# Patient Record
Sex: MALE | Race: BLACK OR AFRICAN AMERICAN | NOT HISPANIC OR LATINO | URBAN - METROPOLITAN AREA
[De-identification: names, ages, dates, MRNs, and addresses within clinical notes are randomized per-mention and may not be internally consistent; named-entity substitution may affect disease eponyms.]

---

## 2018-04-05 ENCOUNTER — INPATIENT (INPATIENT)
Facility: HOSPITAL | Age: 53
LOS: 25 days | Discharge: NURSING, REHAB OR LONG-TERM CARE | DRG: 23 | End: 2018-05-01
Attending: PSYCHIATRY & NEUROLOGY | Admitting: PSYCHIATRY & NEUROLOGY
Payer: COMMERCIAL

## 2018-04-05 VITALS
OXYGEN SATURATION: 99 % | RESPIRATION RATE: 16 BRPM | SYSTOLIC BLOOD PRESSURE: 157 MMHG | DIASTOLIC BLOOD PRESSURE: 83 MMHG | HEART RATE: 60 BPM

## 2018-04-05 DIAGNOSIS — I63.512 CEREBRAL INFARCTION DUE TO UNSPECIFIED OCCLUSION OR STENOSIS OF LEFT MIDDLE CEREBRAL ARTERY: ICD-10-CM

## 2018-04-05 LAB
ANION GAP SERPL CALC-SCNC: 13 MMOL/L — SIGNIFICANT CHANGE UP (ref 5–17)
APTT BLD: 33.4 SEC — SIGNIFICANT CHANGE UP (ref 27.5–37.4)
BUN SERPL-MCNC: 18 MG/DL — SIGNIFICANT CHANGE UP (ref 7–23)
CALCIUM SERPL-MCNC: 9.3 MG/DL — SIGNIFICANT CHANGE UP (ref 8.4–10.5)
CHLORIDE SERPL-SCNC: 97 MMOL/L — SIGNIFICANT CHANGE UP (ref 96–108)
CHOLEST SERPL-MCNC: 210 MG/DL — HIGH (ref 10–199)
CO2 SERPL-SCNC: 24 MMOL/L — SIGNIFICANT CHANGE UP (ref 22–31)
CREAT SERPL-MCNC: 1.38 MG/DL — HIGH (ref 0.5–1.3)
CRP SERPL-MCNC: 0.12 MG/DL — SIGNIFICANT CHANGE UP (ref 0–0.4)
GLUCOSE SERPL-MCNC: 105 MG/DL — HIGH (ref 70–99)
HBA1C BLD-MCNC: 5.1 % — SIGNIFICANT CHANGE UP (ref 4–5.6)
HCT VFR BLD CALC: 42.1 % — SIGNIFICANT CHANGE UP (ref 39–50)
HDLC SERPL-MCNC: 107 MG/DL — SIGNIFICANT CHANGE UP (ref 40–125)
HGB BLD-MCNC: 13.9 G/DL — SIGNIFICANT CHANGE UP (ref 13–17)
INR BLD: 1.13 — SIGNIFICANT CHANGE UP (ref 0.88–1.16)
LIPID PNL WITH DIRECT LDL SERPL: 90 MG/DL — SIGNIFICANT CHANGE UP
MAGNESIUM SERPL-MCNC: 1.9 MG/DL — SIGNIFICANT CHANGE UP (ref 1.6–2.6)
MCHC RBC-ENTMCNC: 28.8 PG — SIGNIFICANT CHANGE UP (ref 27–34)
MCHC RBC-ENTMCNC: 33 G/DL — SIGNIFICANT CHANGE UP (ref 32–36)
MCV RBC AUTO: 87.2 FL — SIGNIFICANT CHANGE UP (ref 80–100)
PCP SPEC-MCNC: SIGNIFICANT CHANGE UP
PHOSPHATE SERPL-MCNC: 3.3 MG/DL — SIGNIFICANT CHANGE UP (ref 2.5–4.5)
PLATELET # BLD AUTO: 238 K/UL — SIGNIFICANT CHANGE UP (ref 150–400)
POTASSIUM SERPL-MCNC: 4.7 MMOL/L — SIGNIFICANT CHANGE UP (ref 3.5–5.3)
POTASSIUM SERPL-SCNC: 4.7 MMOL/L — SIGNIFICANT CHANGE UP (ref 3.5–5.3)
PROTHROM AB SERPL-ACNC: 12.6 SEC — SIGNIFICANT CHANGE UP (ref 9.8–12.7)
RBC # BLD: 4.83 M/UL — SIGNIFICANT CHANGE UP (ref 4.2–5.8)
RBC # FLD: 13.7 % — SIGNIFICANT CHANGE UP (ref 10.3–16.9)
SODIUM SERPL-SCNC: 134 MMOL/L — LOW (ref 135–145)
TOTAL CHOLESTEROL/HDL RATIO MEASUREMENT: 2 RATIO — LOW (ref 3.4–9.6)
TRIGL SERPL-MCNC: 65 MG/DL — SIGNIFICANT CHANGE UP (ref 10–149)
TSH SERPL-MCNC: 0.49 UIU/ML — SIGNIFICANT CHANGE UP (ref 0.35–4.94)
WBC # BLD: 8.2 K/UL — SIGNIFICANT CHANGE UP (ref 3.8–10.5)
WBC # FLD AUTO: 8.2 K/UL — SIGNIFICANT CHANGE UP (ref 3.8–10.5)

## 2018-04-05 PROCEDURE — 99291 CRITICAL CARE FIRST HOUR: CPT

## 2018-04-05 PROCEDURE — 99222 1ST HOSP IP/OBS MODERATE 55: CPT

## 2018-04-05 PROCEDURE — 93306 TTE W/DOPPLER COMPLETE: CPT | Mod: 26

## 2018-04-05 PROCEDURE — 93970 EXTREMITY STUDY: CPT | Mod: 26

## 2018-04-05 PROCEDURE — 61645 PERQ ART M-THROMBECT &/NFS: CPT | Mod: LT

## 2018-04-05 PROCEDURE — 70498 CT ANGIOGRAPHY NECK: CPT | Mod: 26

## 2018-04-05 PROCEDURE — 99291 CRITICAL CARE FIRST HOUR: CPT | Mod: 24

## 2018-04-05 PROCEDURE — 70496 CT ANGIOGRAPHY HEAD: CPT | Mod: 26

## 2018-04-05 PROCEDURE — 76377 3D RENDER W/INTRP POSTPROCES: CPT | Mod: 26

## 2018-04-05 RX ORDER — INSULIN LISPRO 100/ML
VIAL (ML) SUBCUTANEOUS EVERY 6 HOURS
Qty: 0 | Refills: 0 | Status: DISCONTINUED | OUTPATIENT
Start: 2018-04-05 | End: 2018-04-07

## 2018-04-05 RX ORDER — DEXTROSE 50 % IN WATER 50 %
12.5 SYRINGE (ML) INTRAVENOUS ONCE
Qty: 0 | Refills: 0 | Status: DISCONTINUED | OUTPATIENT
Start: 2018-04-05 | End: 2018-04-09

## 2018-04-05 RX ORDER — DEXTROSE 50 % IN WATER 50 %
1 SYRINGE (ML) INTRAVENOUS ONCE
Qty: 0 | Refills: 0 | Status: DISCONTINUED | OUTPATIENT
Start: 2018-04-05 | End: 2018-04-09

## 2018-04-05 RX ORDER — GLUCAGON INJECTION, SOLUTION 0.5 MG/.1ML
1 INJECTION, SOLUTION SUBCUTANEOUS ONCE
Qty: 0 | Refills: 0 | Status: DISCONTINUED | OUTPATIENT
Start: 2018-04-05 | End: 2018-04-09

## 2018-04-05 RX ORDER — SODIUM CHLORIDE 9 MG/ML
1000 INJECTION INTRAMUSCULAR; INTRAVENOUS; SUBCUTANEOUS
Qty: 0 | Refills: 0 | Status: DISCONTINUED | OUTPATIENT
Start: 2018-04-05 | End: 2018-04-06

## 2018-04-05 RX ORDER — PANTOPRAZOLE SODIUM 20 MG/1
40 TABLET, DELAYED RELEASE ORAL EVERY 24 HOURS
Qty: 0 | Refills: 0 | Status: DISCONTINUED | OUTPATIENT
Start: 2018-04-05 | End: 2018-04-06

## 2018-04-05 RX ORDER — ATORVASTATIN CALCIUM 80 MG/1
80 TABLET, FILM COATED ORAL AT BEDTIME
Qty: 0 | Refills: 0 | Status: DISCONTINUED | OUTPATIENT
Start: 2018-04-05 | End: 2018-05-01

## 2018-04-05 RX ORDER — PANTOPRAZOLE SODIUM 20 MG/1
40 TABLET, DELAYED RELEASE ORAL
Qty: 0 | Refills: 0 | Status: DISCONTINUED | OUTPATIENT
Start: 2018-04-05 | End: 2018-04-05

## 2018-04-05 RX ORDER — ACETAMINOPHEN 500 MG
650 TABLET ORAL EVERY 6 HOURS
Qty: 0 | Refills: 0 | Status: DISCONTINUED | OUTPATIENT
Start: 2018-04-05 | End: 2018-05-01

## 2018-04-05 RX ORDER — SODIUM CHLORIDE 9 MG/ML
1000 INJECTION, SOLUTION INTRAVENOUS
Qty: 0 | Refills: 0 | Status: DISCONTINUED | OUTPATIENT
Start: 2018-04-05 | End: 2018-04-09

## 2018-04-05 RX ORDER — DEXTROSE 50 % IN WATER 50 %
12.5 SYRINGE (ML) INTRAVENOUS ONCE
Qty: 0 | Refills: 0 | Status: COMPLETED | OUTPATIENT
Start: 2018-04-05 | End: 2018-04-05

## 2018-04-05 RX ORDER — SODIUM CHLORIDE 9 MG/ML
1000 INJECTION INTRAMUSCULAR; INTRAVENOUS; SUBCUTANEOUS
Qty: 0 | Refills: 0 | Status: DISCONTINUED | OUTPATIENT
Start: 2018-04-05 | End: 2018-04-05

## 2018-04-05 RX ADMIN — SODIUM CHLORIDE 75 MILLILITER(S): 9 INJECTION INTRAMUSCULAR; INTRAVENOUS; SUBCUTANEOUS at 11:34

## 2018-04-05 RX ADMIN — PANTOPRAZOLE SODIUM 40 MILLIGRAM(S): 20 TABLET, DELAYED RELEASE ORAL at 17:46

## 2018-04-05 RX ADMIN — Medication 12.5 GRAM(S): at 23:20

## 2018-04-05 RX ADMIN — SODIUM CHLORIDE 100 MILLILITER(S): 9 INJECTION INTRAMUSCULAR; INTRAVENOUS; SUBCUTANEOUS at 22:00

## 2018-04-05 NOTE — H&P ADULT - PMH
Cerebrovascular accident (CVA) due to occlusion of left middle cerebral artery  Left M2 occlusion  Irregular heart beat

## 2018-04-05 NOTE — PROVIDER CONTACT NOTE (OTHER) - BACKGROUND
As per report from patient presents with right sided weakness and difficulty speaking and following command.

## 2018-04-05 NOTE — H&P ADULT - PROBLEM SELECTOR PLAN 1
S/P IV TPA and cerebral angiogram with successful mechanical thrombectomy.  -150  Neuro checks along with vitals, puncture site and pulses checks as per orders.  NPO x 24 hrs  No anticoagulants or anti thrombotics x 24 hrs  Repeat head CT 24 hrs post IV TPA or if increased swelling right periorbital region or change in MS.  LE Dopplers urgent r/o DVT  Hold VTE S/P IV TPA and cerebral angiogram with successful mechanical thrombectomy.  -150  Neuro checks along with vitals, puncture site and pulses checks as per orders.  NPO for now  No anticoagulants or anti thrombotics x 24 hrs  Repeat head CT 24 hrs post IV TPA or if increased swelling right periorbital region or change in MS.  LE Dopplers urgent r/o DVT

## 2018-04-05 NOTE — H&P ADULT - HISTORY OF PRESENT ILLNESS
52 year old ambidextrous (right  hand preference) who has been under the care of cardiologist in [facility] (Dr. Ding) for "heart palpitations, not on any medications.  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 for further care.  Upon arrival patient noted to be aphasic, right facial droop, right hemiplegia, and right side neglect.  A small hematoma note above his right eye.  CTA left M2 occlusion taken to angio suite for mechanical thrombectomy. 52 year old ambidextrous (right  hand preference) who has been under the care of cardiologist in [facility] (Dr. Ding) for "heart palpitations, not on any medications.  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  Upon arrival patient noted to be aphasic, right facial droop, right hemiplegia, and left gaze preference.  A small hematoma note above his right eye.  CTA left M2 occlusion taken to angio suite for mechanical thrombectomy.

## 2018-04-05 NOTE — PROVIDER CONTACT NOTE (OTHER) - SITUATION
Patient received from [facility] via ambulance accompanied by EMT for stroke. Patient transferred from stretcher to bed. All safety precautions maintained. Stroke code called.

## 2018-04-05 NOTE — PROVIDER CONTACT NOTE (OTHER) - ASSESSMENT
Urine out 20ml x1 hour. Urine output decreasing throughout day. -115. No changes in neuro status noted.

## 2018-04-05 NOTE — H&P ADULT - NSHPPHYSICALEXAM_GEN_ALL_CORE
General: WD,WN male in NAD  Neuro: Not FC, Aphasic, right hemiplegia, right facial droop, and left gaze preference.             Left side 5/5  Heart: S1-S2  Lungs: CTA B/L  Abd: soft, NT, ND  Ext: No C/C/E  Skin: No obvious rashes, no achymosis. General: WD,WN male in NAD  Neuro: Not FC, Aphasic, right hemiplegia, right facial droop, and left gaze preference.             Left side 5/5  Heart: S1-S2  Lungs: CTA B/L  Abd: soft, NT, ND  Ext: No C/C/E  Skin: No obvious rashes, no ecchymosis.

## 2018-04-05 NOTE — H&P ADULT - NSHPSOCIALHISTORY_GEN_ALL_CORE
, 2 daughters, employed as a airplane maintainace person, no ilicit drug use, no smoking, social (weekend) drinker , 2 daughters, employed as a airplane , no illicit drug use, no smoking, social (weekend) drinker

## 2018-04-05 NOTE — CONSULT NOTE ADULT - SUBJECTIVE AND OBJECTIVE BOX
Patient is a 51 yo male with a pmhx of racing heart as per wife, otherwise no other PMHx and not on medications who is a transfer from [facility] for possible mechanical thrombectomy. 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 Patient unable to give a hx at this current time. Given TPA bolus 5:27 am for global aphasia, right sided arm and leg weakness.      REVIEW OF SYSTEMS:  Unable to obtain   VITAL SIGNS:  Vital Signs Last 24 Hrs  T(C): --  T(F): --  HR: --  BP: --  BP(mean): --  RR: --  SpO2: --    PHYSICAL EXAMINATION:  General: Well-developed, foaming at the mouth   Eyes: Conjunctiva and sclera clear.  Cardiovascular: Regular rate and rhythm; S1 and S2 Courtney  Neurologic:  - Mental Status:  Patient aphasic- intermittent following commands   - Cranial Nerves II-XII:    II:  Visual acuity is 20/20 bilaterally; Visual fields are full to confronation; Pupils are equal, round, and reactive to light.  III, IV, VI:  Extraocular movements are intact without nystagmus.  V:  Facial sensation is intact in the V1-V3 distribution bilaterally.  VII: right sided facial droop noted and patient unable to raise eyebrows   VIII:  Hearing is intact to finger rub.  IX, X:  Uvula is midline and soft palate rises symmetrically  XI:  Head turning and shoulder shrug are intact.  XII:  Tongue protudes in the midline.  - Motor:  Strength is 5/5 throughout.  There is no prontator drift.  Normal muscle bulk and tone throughout.  - Reflexes:  2+ and symmetric at the biceps, triceps, brachioradialis, knees, and ankles.  Plantar responses flexor.  - Sensory:  Intact to light touch, pin prick, vibration, and joint-position sense throughout.  - Coordination:  Finger-nose-finger and heel-knee-shin intact without dysmetria.  Rapid alternating hand movements intact.  - Gait:   Normal steps, base, arm swing, and turning.  Heel and toe walking are normal.  Tandem gait is normal.  Romberg testing is negative.    LABS:           RADIOLOGY & ADDITIONAL STUDIES:      IMPRESSION & PLAN:      Patient is a 51 yo male with a pmhx of racing heart as per wife, otherwise no other PMHx and not on medications who is a transfer from [facility] for possible mechanical thrombectomy. 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   Discussed with attending and transported to 11th floor for procedure. Patient is a 51 yo male with a pmhx of racing heart as per wife, otherwise no other PMHx and not on medications who is a transfer from [facility] for possible mechanical thrombectomy. 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 Patient unable to give a hx at this current time. Given TPA bolus 5:27 am for global aphasia, right sided arm and leg weakness.      REVIEW OF SYSTEMS:  Unable to obtain   VITAL SIGNS:  Vital Signs Last 24 Hrs  T(C): --  T(F): --  HR: --  BP: --  BP(mean): --  RR: --  SpO2: --    PHYSICAL EXAMINATION:  General: Well-developed, foaming at the mouth   Eyes: Conjunctiva and sclera clear.  Cardiovascular: Regular rate and rhythm; S1 and S2 Normal  Neurologic:  - Mental Status:  Patient aphasic- intermittent following commands   - Cranial Nerves II-XII:    II: Right visual field not responsive to confrontation;   III, IV, VI:  left gaze preference  VII: right sided facial droop noted and patient unable to raise eyebrows   XI:  unable to test   XII:  not following commands   - Motor:  no strength to gravity appreciated   There is no prontator drift.  Normal muscle bulk and tone throughout.  - Sensory: diminished sensation and proprioception appreciated in the right arm   - Coordination: not following commands   - Gait:  Deferred    LABS:     Pending       RADIOLOGY & ADDITIONAL STUDIES:  CT     IMPRESSION & PLAN:      Patient is a 51 yo male with a pmhx of "racing heart" as per wife, otherwise no other PMHx and not on medications who is a transfer from [facility] for possible mechanical thrombectomy for left M2 clot. 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   -- MRI needs to be ordered  -- high intensity statin, baby ASA,   -- permissive HTN up till 185 systolic in setting of TPA  -- follow TPA protocol;  Discussed with Neuro attending and transported to 11th floor for procedure. Patient is a 51 yo male with a pmhx of racing heart as per wife, otherwise no other PMHx and not on medications who is a transfer from [facility] for possible mechanical thrombectomy. As per EMS documentation, patient was found fallen working [place]. Patient unable to give a hx at this current time. Given TPA bolus 5:27 am for global aphasia, right sided arm and leg weakness.      REVIEW OF SYSTEMS:  Unable to obtain   VITAL SIGNS:  Vital Signs Last 24 Hrs  T(C): --  T(F): --  HR: --  BP: --  BP(mean): --  RR: --  SpO2: --    PHYSICAL EXAMINATION:  General: Well-developed, foaming at the mouth   Eyes: Conjunctiva and sclera clear.  Cardiovascular: Regular rate and rhythm; S1 and S2 Normal  Neurologic:  - Mental Status:  Patient aphasic- intermittent following commands   - Cranial Nerves II-XII:    II: Right visual field not responsive to confrontation;   III, IV, VI:  left gaze preference  VII: right sided facial droop noted and patient unable to raise eyebrows   XI:  unable to test   XII:  not following commands   - Motor:  no strength to gravity appreciated   There is no prontator drift.  Normal muscle bulk and tone throughout.  - Sensory: diminished sensation and proprioception appreciated in the right arm   - Coordination: not following commands   - Gait:  Deferred    LABS:     Pending       RADIOLOGY & ADDITIONAL STUDIES:  CT     IMPRESSION & PLAN:      Patient is a 51 yo male with a pmhx of "racing heart" as per wife, otherwise no other PMHx and not on medications who is a transfer from [facility] for possible mechanical thrombectomy for left M2 occlusion. 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   -- MRI needs to be ordered  -- high intensity statin, baby ASA,   -- permissive HTN up till 185 systolic in setting of TPA  -- follow TPA protocol;  Discussed with Neuro attending and transported to 11th floor for procedure. Patient is a 53 yo male with a pmhx of racing heart as per wife, otherwise no other PMHx and not on medications who is a transfer from [facility] for possible mechanical thrombectomy. 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 Last known normal was 3am. Patient unable to give a hx at this current time. Given TPA bolus 5:27 am for global aphasia, right sided arm and leg weakness.      REVIEW OF SYSTEMS:  Unable to obtain   VITAL SIGNS:  Vital Signs Last 24 Hrs  T(C): --  T(F): --  HR: --  BP: --  BP(mean): --  RR: --  SpO2: --    PHYSICAL EXAMINATION:  General: Well-developed, foaming at the mouth   Eyes: Conjunctiva and sclera clear.  Cardiovascular: Regular rate and rhythm; S1 and S2 Normal  Neurologic:  - Mental Status:  Patient aphasic- intermittent following commands   - Cranial Nerves II-XII:    II: Right visual field not responsive to confrontation;   III, IV, VI:  left gaze preference  VII: right sided facial droop noted and patient unable to raise eyebrows   XI:  unable to test   XII:  not following commands   - Motor:  no strength to gravity appreciated   There is no prontator drift.  Normal muscle bulk and tone throughout.  - Sensory: diminished sensation and proprioception appreciated in the right arm   - Coordination: not following commands   - Gait:  Deferred    LABS:     Pending       RADIOLOGY & ADDITIONAL STUDIES:  CT     IMPRESSION & PLAN:      Patient is a 53 yo male with a pmhx of "racing heart" as per wife, otherwise no other PMHx and not on medications who is a transfer from [facility] for possible mechanical thrombectomy for left M2 occlusion. 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   -- MRI needs to be ordered  -- high intensity statin, baby ASA,   -- permissive HTN up till 185 systolic in setting of TPA  -- follow TPA protocol;  Discussed with Neuro attending and transported to 11th floor for procedure.

## 2018-04-05 NOTE — BRIEF OPERATIVE NOTE - POST-OP DX
Cerebrovascular accident (CVA) due to occlusion of left middle cerebral artery  04/05/2018  Succesful recanalization TICI3  Active  Ronni Moralez

## 2018-04-05 NOTE — H&P ADULT - ASSESSMENT
NIHSS of 20 admitted for emergent angio and thrombectomy  q1h neuro checks  post intervention orders per protocol  formal s/s eval  resume home meds  rpt CTH 24 hrs from tpa, tomorrow am  NV checks   dc groin dsg in am  d/w Dr. Mota and ICU house staff

## 2018-04-05 NOTE — PROVIDER CONTACT NOTE (OTHER) - ACTION/TREATMENT ORDERED:
Finish dialysis and remove fluid. Give amiodarone bolus and then start amiodarone drip for heart rate control. Give 1 full amp of D50 and then recheck blood sugar in 15 minutes.

## 2018-04-05 NOTE — CONSULT NOTE ADULT - SUBJECTIVE AND OBJECTIVE BOX
HEALTH ISSUES - PROBLEM Dx:  Cerebrovascular accident (CVA) due to occlusion of left middle cerebral artery: Cerebrovascular accident (CVA) due to occlusion of left middle cerebral artery          CHEMOTHERAPY REGIMEN:        Day:                          Diet:  Protocol:                                    IVF:      MEDICATIONS  (STANDING):  atorvastatin 80 milliGRAM(s) Oral at bedtime  dextrose 5%. 1000 milliLiter(s) (50 mL/Hr) IV Continuous <Continuous>  dextrose 50% Injectable 12.5 Gram(s) IV Push once  insulin lispro (HumaLOG) corrective regimen sliding scale   SubCutaneous every 6 hours  pantoprazole  Injectable 40 milliGRAM(s) IV Push every 24 hours  sodium chloride 0.9%. 1000 milliLiter(s) (100 mL/Hr) IV Continuous <Continuous>    MEDICATIONS  (PRN):  acetaminophen   Tablet. 650 milliGRAM(s) Oral every 6 hours PRN Mild Pain (1 - 3)  dextrose Gel 1 Dose(s) Oral once PRN Blood Glucose LESS THAN 70 milliGRAM(s)/deciliter  glucagon  Injectable 1 milliGRAM(s) IntraMuscular once PRN Glucose LESS THAN 70 milligrams/deciliter      Allergies    Allergy Status Unknown    Intolerances        DVT Prophylaxis: [ ] YES [ ] NO      Antibiotics: [ ] YES [ ] NO    Pain Scale (1-10):       Location:    Vital Signs Last 24 Hrs  T(C): 36.8 (05 Apr 2018 21:39), Max: 36.8 (05 Apr 2018 14:26)  T(F): 98.3 (05 Apr 2018 21:39), Max: 98.3 (05 Apr 2018 21:39)  HR: 66 (05 Apr 2018 20:00) (58 - 84)  BP: 104/57 (05 Apr 2018 11:30) (104/57 - 157/83)  BP(mean): 76 (05 Apr 2018 11:30) (74 - 103)  RR: 13 (05 Apr 2018 20:00) (10 - 18)  SpO2: 99% (05 Apr 2018 20:00) (97% - 100%)    Drug Dosing Weight  Height (cm): 180.34 (05 Apr 2018 09:00)  Weight (kg): 92.3 (05 Apr 2018 09:00)  BMI (kg/m2): 28.4 (05 Apr 2018 09:00)  BSA (m2): 2.12 (05 Apr 2018 09:00)     Physical Exam:  · Constitutional	detailed exam  · Constitutional Details	well-developed; well-groomed  · Eyes	EOMI; PERRL; no drainage or redness  · ENMT Comments	dry mucous membranes  · Respiratory	detailed exam  · Respiratory Comments	normal breath sounds at the lung bases bilaterally  · Cardiovascular	Regular rate & rhythm, normal S1, S2; no murmurs, gallops or rubs; no S3, S4  · Abd-Soft non tender  ·Ext-no edema, clubbing or cyanosis    URINARY CATHETER: [ ] YES [ ] NO     LABS:  CBC Full  -  ( 05 Apr 2018 12:59 )  WBC Count : 8.2 K/uL  Hemoglobin : 13.9 g/dL  Hematocrit : 42.1 %  Platelet Count - Automated : 238 K/uL  Mean Cell Volume : 87.2 fL  Mean Cell Hemoglobin : 28.8 pg  Mean Cell Hemoglobin Concentration : 33.0 g/dL  Auto Neutrophil # : x  Auto Lymphocyte # : x  Auto Monocyte # : x  Auto Eosinophil # : x  Auto Basophil # : x  Auto Neutrophil % : x  Auto Lymphocyte % : x  Auto Monocyte % : x  Auto Eosinophil % : x  Auto Basophil % : x    04-05    134<L>  |  97  |  18  ----------------------------<  105<H>  4.7   |  24  |  1.38<H>    Ca    9.3      05 Apr 2018 12:54  Phos  3.3     04-05  Mg     1.9     04-05      PT/INR - ( 05 Apr 2018 12:54 )   PT: 12.6 sec;   INR: 1.13          PTT - ( 05 Apr 2018 12:54 )  PTT:33.4 sec      CULTURES:    RADIOLOGY & ADDITIONAL STUDIES:

## 2018-04-05 NOTE — PROGRESS NOTE ADULT - ASSESSMENT
52M with   1.  acute cerebrovascular accident, L M2 occlusion, s/p thrombectomy (04/05/2018, Dr. Mota) s/p TPA  2.  hyponatremia  3.  arrhythmia    HPI: onset / last normal; NIHSS; tPA inclusion/exclusion  DIAGNOSTICS: check HbA1C, check lipid panel (within 48h or last 30 days); Echo with bubble contrast; CT CTA MRI  MEDS: ASA within 24h, lipitor 80mg HS, SCDs, DVT Px by day 2, antithrombotic by day 2  CONSULTS: - REHAB:  dysphagia screen; speech and swallow evaluation; OT/PT evaluation; stroke consult; stroke education  MISCELLANEOUS: smoking cessation    PLAN:   NEURO: neurochecks q1h, PRN pain meds with Tylenol  stroke:  complete stroke core measures  REHAB:  physical therapy evaluation and management    EARLY MOB:      PULM:  Room air, incentive spirometry  CARDIO:  SBP goal 100-150mm Hg  ENDO:  Blood sugar goals 140-180 mg/dL, continue insulin sliding scale  GI:  PPI for GI prophylaxis  DIET:  RENAL:    HEM/ONC:  VTE Prophylaxis:   ID: afebrile, no leukocytosis  Social: will update family 52M with   1.  acute cerebrovascular accident, L M2 occlusion, s/p thrombectomy (04/05/2018, Dr. Mota) s/p TPA  2.  hyponatremia  3.  arrhythmia    PLAN:   NEURO: neurochecks q1h, PRN pain meds with Tylenol  stroke:  complete stroke core measures; MRI; post-tPA protocol, repeat CT in 24hours post-tPA; continue high dose statins; will start ASA tomorrow if repeat CT no hemorrhagic conversion.  REHAB:  physical therapy evaluation and management    EARLY MOB:      PULM:  Room air, incentive spirometry  CARDIO:  SBP goal 100-150mm Hg; echo with BS, needs BRANDON  ENDO:  Blood sugar goals 140-180 mg/dL, continue insulin sliding scale; f/u A12C lipid panel, TSH  GI:  PPI for GI prophylaxis  DIET: NPO for now; dysphagia screen tomorrow  RENAL:  IVF while NPO  HEM/ONC: s/p tPA  VTE Prophylaxis: SCDs only, no DVT chemoprophylaxis for now as patient is high risk for bleed (post-tPA)  ID: afebrile, no leukocytosis  Social: will update family    Patient at high risk for neurological deterioration or death due to:  hemorrhagic conversion, ICU delirium, DVT / PE, aspiration PNA.  Critical care time, excluding procedures: 60 minutes. 52M with   1.  acute cerebrovascular accident, L M2 occlusion, s/p thrombectomy (04/05/2018, Dr. Mota) s/p TPA  2.  hyponatremia  3.  arrhythmia    PLAN:   NEURO: neurochecks q1h, PRN pain meds with Tylenol  stroke:  complete stroke core measures; MRI; post-tPA protocol, repeat CT in 24hours post-tPA; continue high dose statins; will start ASA tomorrow if repeat CT no hemorrhagic conversion; hypercoagulable work-up  REHAB:  physical therapy evaluation and management    EARLY MOB:      PULM:  Room air, incentive spirometry  CARDIO:  SBP goal 120-150mm Hg; echo with BS, needs BRANDON  ENDO:  Blood sugar goals 140-180 mg/dL, continue insulin sliding scale; f/u A12C lipid panel, TSH  GI:  PPI for GI prophylaxis  DIET: NPO for now; dysphagia screen tomorrow  RENAL:  IVF while NPO  HEM/ONC: s/p tPA  VTE Prophylaxis: SCDs only, no DVT chemoprophylaxis for now as patient is high risk for bleed (post-tPA)  ID: afebrile, no leukocytosis  Social: will update family    Patient at high risk for neurological deterioration or death due to:  hemorrhagic conversion, ICU delirium, DVT / PE, aspiration PNA.  Critical care time, excluding procedures: 60 minutes.

## 2018-04-05 NOTE — BRIEF OPERATIVE NOTE - PROCEDURE
<<-----Click on this checkbox to enter Procedure Cerebral angiography with thrombolysis or thrombectomy if indicated  04/05/2018  Left M2 thrombectomy using Penumbra aspiration and stent retriever (Solitaire x1 pass)  Active  GRESTREP

## 2018-04-05 NOTE — H&P ADULT - FAMILY HISTORY
Father  Still living? No  Family history of cerebrovascular accident (CVA) in father, Age at diagnosis: Age Unknown

## 2018-04-05 NOTE — PROGRESS NOTE ADULT - SUBJECTIVE AND OBJECTIVE BOX
Post op Note    Dx: left M2 stroke    52y    Male   s/p cath angio and thrombectomy with successful recannilization of M2 segment of left MCA. Brought to MICU post op, extubated. Denies any pain. Heme/onc consulted for hypercoag w/u, echo performed.     T(C): 36.4 (04-05-18 @ 09:40), Max: 36.4 (04-05-18 @ 09:40)  HR: 58 (04-05-18 @ 11:00) (58 - 66)  BP: 119/64 (04-05-18 @ 11:00) (112/58 - 157/83)  RR: 14 (04-05-18 @ 11:00) (10 - 17)  SpO2: 100% (04-05-18 @ 11:00) (99% - 100%)  Wt(kg): --      Physical exam  awake, alert, severely aphasic, follows some simple commands  PERRL, EOMI  groin site c/d/i  Lt arm 5/5, LLE 5/5, neg drift, rt side hemiplegic, trace RLE movement to noxious stimulus  abd soft, NTND  respirations non labored  shepherd, a line, PIVs

## 2018-04-05 NOTE — PROVIDER CONTACT NOTE (OTHER) - ASSESSMENT
Receiving dialysis and more restless. -140 and afib. No increase in levophed at this. 1.7L out of 3L from dialysis removed. UF on hold at this time by dialysis RN. FS 71. No changes in neuro status noted/

## 2018-04-05 NOTE — PROGRESS NOTE ADULT - SUBJECTIVE AND OBJECTIVE BOX
=================================  NEUROCRITICAL CARE ATTENDING NOTE  =================================    FABRICIO ZEPEDA   MRN-[id number]  Summary:  52M with h/o palpitations, last normal at 5:03 a.m., found down by co-worker, taken to [facility].  CT showed L M2 occlusion, given tPA and transferred to [facility] for further care.  CTA showed M2 occlusion, brought to angio suite for mechanical thrombectomy.  Overnight Events: Admitted to NSICU     PAST MEDICAL & SURGICAL HISTORY: Cerebrovascular accident (CVA) due to occlusion of left middle cerebral artery: Left M2 occlusion Irregular heart beat  Allergies: ?  Home meds:  MVI    PHYSICAL EXAMINATION  T(C): 36.8 ( @ 14:26), Max: 36.8 ( @ 14:26) HR: 74 ( @ 14:00) (58 - 84) BP: 104/57 ( @ 11:30) (104/57 - 157/83) RR: 11 ( @ 14:00) (10 - 17) SpO2: 100% ( @ 14:00) (99% - 100%)   NEUROLOGIC EXAMINATION:  Patient is awake, alert, pupils 3mm equal and reactive to light, no fixed gaze, aphasic, follows commands intermittently, L UE/LE 5/5, R UE 0/5, R LE 1/5 to noxious  GENERAL:  not intubated, not in cardiorespiratory distress  EENT: anicteric  CARDIOVASC:  (+) S1 S2, normal rate and regular rhythm  PULMONARY:  clear to auscultation bilaterally  ABDOMEN:  soft, nontender, with normoactive bowel sounds  EXTREMITIES:  no edema  SKIN:  no rash    LABS:  CAPILLARY BLOOD GLUCOSE 81 89               13.9   8.2   )-----------( 238      ( 2018 12:59 )             42.1     134<L>  |  97  |  18  ----------------------------<  105<H>  4.7   |  24  |  1.38<H>    Ca    9.3      2018 12:54  Phos  3.3     -05  Mg     1.9      @ 07:01  -   @ 07:00  IN: 20 mL / OUT: 0 mL / NET: 20 mL    Bacteriology:  CSF studies:  EEG:  Neuroimagin/05 CTA:  abrupt cut off L M2 segment superior division c/w occlusion, R P1 segment hpoplastic   CT head:  L caudate head lacunar infarct indeterminate, L parietal old infarct, post L frontal remote infarct, chronic lacunar infarct L caudate head, no acute abnormality  Other imagin/05 LE Doppler: R CFV not examined, no DVT    MEDICATIONS: atorvastatin 80mg HS mod ISS pantoprazole 40 IV q24h     IV FLUIDS: NS@75cc/hr  DRIPS:  DIET: NPO  Lines:  Drains:      CODE STATUS:  full code                       GOALS OF CARE:  aggressive                      DISPOSITION:  ICU =================================  NEUROCRITICAL CARE ATTENDING NOTE  =================================    FABRICIO ZEPEDA   [id number]  Summary:  52M with h/o palpitations, last normal at 5:03 a.m., found down by co-worker, taken to [facility].  CT showed L M2 occlusion, given tPA and transferred to [facility] for further care.  CTA showed M2 occlusion, brought to angio suite for mechanical thrombectomy.  Overnight Events: Admitted to NSICU     PAST MEDICAL & SURGICAL HISTORY: Cerebrovascular accident (CVA) due to occlusion of left middle cerebral artery: Left M2 occlusion Irregular heart beat  Allergies: ?  Home meds:  MVI    PHYSICAL EXAMINATION  T(C): 36.8 ( @ 14:26), Max: 36.8 ( @ 14:26) HR: 74 ( @ 14:00) (58 - 84) BP: 104/57 ( @ 11:30) (104/57 - 157/83) RR: 11 ( @ 14:00) (10 - 17) SpO2: 100% ( @ 14:00) (99% - 100%)   NEUROLOGIC EXAMINATION:  Patient is awake, alert, pupils 3mm equal and reactive to light, no fixed gaze, aphasic, follows commands intermittently, L UE/LE 5/5, R UE 0/5, R LE 1/5 to noxious  GENERAL:  not intubated, not in cardiorespiratory distress  EENT: anicteric  CARDIOVASC:  (+) S1 S2, normal rate and regular rhythm  PULMONARY:  clear to auscultation bilaterally  ABDOMEN:  soft, nontender, with normoactive bowel sounds  EXTREMITIES:  no edema  SKIN:  no rash    LABS:  CAPILLARY BLOOD GLUCOSE 81 89               13.9   8.2   )-----------( 238      ( 2018 12:59 )             42.1     134<L>  |  97  |  18  ----------------------------<  105<H>  4.7   |  24  |  1.38<H>    Ca    9.3      2018 12:54  Phos  3.3     -05  Mg     1.9      @ 07:01  -   @ 07:00  IN: 20 mL / OUT: 0 mL / NET: 20 mL    Bacteriology:  CSF studies:  EEG:  Neuroimagin/05 CTA:  abrupt cut off L M2 segment superior division c/w occlusion, R P1 segment hpoplastic   CT head:  L caudate head lacunar infarct indeterminate, L parietal old infarct, post L frontal remote infarct, chronic lacunar infarct L caudate head, no acute abnormality  Other imagin/05 LE Doppler: R CFV not examined, no DVT    MEDICATIONS: atorvastatin 80mg HS mod ISS pantoprazole 40 IV q24h     IV FLUIDS: NS@75cc/hr  DRIPS:  DIET: NPO  Lines:  Drains:      CODE STATUS:  full code                       GOALS OF CARE:  aggressive                      DISPOSITION:  ICU  NIHSS 20, tPA given, thrombectomy with full recanalization

## 2018-04-05 NOTE — PROGRESS NOTE ADULT - ASSESSMENT
POD 0 s/p tpa and thrombectomy admitted to MICU for observation and w/u  -q1h neuro checks  -HOb flat x 4 hrs then can gradually be raised  -appreciate heme/onc consult for hyper coag w/u  -f/u CTH in 24 hrs from tpa or sooner if change in MS  -SBP goal 120-150  -a line for HD monitoring  -groin checks  -NV checks  -formal speech and swallow eval pending  -f/u LE duplex  -pt/ot pending  -prn tylenol pain control  -d/w Dr. Mota and Icu house staff

## 2018-04-05 NOTE — BRIEF OPERATIVE NOTE - OPERATION/FINDINGS
Under MAC sedation, using a 8 fr sheath right CFA, cerebral angiogram was performed.  Findings: Left M2 Angular branch occlusion, penumbra aspiration and stent triever x 1 pass for complete recanalization, TICI 3  Full report to follow.  Patient tolerated procedure well, no new neurological deficit, hemodynamically stable.  Right groin/vasc access site: Perclose, direct manual compression applied, hemostasis achieved, no hematoma.  Patient was transferred to MICU  Above d/w Dr. Mota

## 2018-04-05 NOTE — BRIEF OPERATIVE NOTE - PRE-OP DX
Cerebrovascular accident (CVA) due to occlusion of left middle cerebral artery  04/05/2018  Left M2 occlusion  Active  Ronni Moralez

## 2018-04-06 DIAGNOSIS — N17.9 ACUTE KIDNEY FAILURE, UNSPECIFIED: ICD-10-CM

## 2018-04-06 DIAGNOSIS — R00.2 PALPITATIONS: ICD-10-CM

## 2018-04-06 DIAGNOSIS — R63.8 OTHER SYMPTOMS AND SIGNS CONCERNING FOOD AND FLUID INTAKE: ICD-10-CM

## 2018-04-06 DIAGNOSIS — Z29.9 ENCOUNTER FOR PROPHYLACTIC MEASURES, UNSPECIFIED: ICD-10-CM

## 2018-04-06 LAB
ANION GAP SERPL CALC-SCNC: 10 MMOL/L — SIGNIFICANT CHANGE UP (ref 5–17)
APCR PPP: 2.8 RATIO — SIGNIFICANT CHANGE UP
AT III ACT/NOR PPP CHRO: 64 % — LOW (ref 85–135)
B2 GLYCOPROT1 AB SER QL: NEGATIVE — SIGNIFICANT CHANGE UP
BUN SERPL-MCNC: 12 MG/DL — SIGNIFICANT CHANGE UP (ref 7–23)
CALCIUM SERPL-MCNC: 9 MG/DL — SIGNIFICANT CHANGE UP (ref 8.4–10.5)
CARDIOLIPIN AB SER-ACNC: POSITIVE
CHLORIDE SERPL-SCNC: 101 MMOL/L — SIGNIFICANT CHANGE UP (ref 96–108)
CHOLEST SERPL-MCNC: 202 MG/DL — HIGH (ref 10–199)
CO2 SERPL-SCNC: 24 MMOL/L — SIGNIFICANT CHANGE UP (ref 22–31)
CREAT SERPL-MCNC: 1.44 MG/DL — HIGH (ref 0.5–1.3)
DRVVT SCREEN TO CONFIRM RATIO: SIGNIFICANT CHANGE UP
GLUCOSE SERPL-MCNC: 97 MG/DL — SIGNIFICANT CHANGE UP (ref 70–99)
HBA1C BLD-MCNC: 5.2 % — SIGNIFICANT CHANGE UP (ref 4–5.6)
HCT VFR BLD CALC: 41.2 % — SIGNIFICANT CHANGE UP (ref 39–50)
HCYS SERPL-MCNC: 9.4 UMOL/L — SIGNIFICANT CHANGE UP (ref 5–15)
HDLC SERPL-MCNC: 109 MG/DL — SIGNIFICANT CHANGE UP (ref 40–125)
HGB BLD-MCNC: 13.9 G/DL — SIGNIFICANT CHANGE UP (ref 13–17)
LA NT DPL PPP QL: 24.4 SEC — SIGNIFICANT CHANGE UP
LIPID PNL WITH DIRECT LDL SERPL: 82 MG/DL — SIGNIFICANT CHANGE UP
MAGNESIUM SERPL-MCNC: 1.8 MG/DL — SIGNIFICANT CHANGE UP (ref 1.6–2.6)
MCHC RBC-ENTMCNC: 28.7 PG — SIGNIFICANT CHANGE UP (ref 27–34)
MCHC RBC-ENTMCNC: 33.7 G/DL — SIGNIFICANT CHANGE UP (ref 32–36)
MCV RBC AUTO: 85.1 FL — SIGNIFICANT CHANGE UP (ref 80–100)
PHOSPHATE SERPL-MCNC: 2.5 MG/DL — SIGNIFICANT CHANGE UP (ref 2.5–4.5)
PLATELET # BLD AUTO: 241 K/UL — SIGNIFICANT CHANGE UP (ref 150–400)
POTASSIUM SERPL-MCNC: 4.2 MMOL/L — SIGNIFICANT CHANGE UP (ref 3.5–5.3)
POTASSIUM SERPL-SCNC: 4.2 MMOL/L — SIGNIFICANT CHANGE UP (ref 3.5–5.3)
PROT C ACT/NOR PPP: 83 % — SIGNIFICANT CHANGE UP (ref 74–150)
RBC # BLD: 4.84 M/UL — SIGNIFICANT CHANGE UP (ref 4.2–5.8)
RBC # FLD: 13.7 % — SIGNIFICANT CHANGE UP (ref 10.3–16.9)
SODIUM SERPL-SCNC: 135 MMOL/L — SIGNIFICANT CHANGE UP (ref 135–145)
TOTAL CHOLESTEROL/HDL RATIO MEASUREMENT: 1.9 RATIO — LOW (ref 3.4–9.6)
TRIGL SERPL-MCNC: 56 MG/DL — SIGNIFICANT CHANGE UP (ref 10–149)
TSH SERPL-MCNC: 0.39 UIU/ML — SIGNIFICANT CHANGE UP (ref 0.35–4.94)
WBC # BLD: 7.5 K/UL — SIGNIFICANT CHANGE UP (ref 3.8–10.5)
WBC # FLD AUTO: 7.5 K/UL — SIGNIFICANT CHANGE UP (ref 3.8–10.5)

## 2018-04-06 PROCEDURE — 99232 SBSQ HOSP IP/OBS MODERATE 35: CPT

## 2018-04-06 PROCEDURE — 99291 CRITICAL CARE FIRST HOUR: CPT | Mod: 24

## 2018-04-06 PROCEDURE — 99233 SBSQ HOSP IP/OBS HIGH 50: CPT

## 2018-04-06 PROCEDURE — 70450 CT HEAD/BRAIN W/O DYE: CPT | Mod: 26

## 2018-04-06 RX ORDER — ONDANSETRON 8 MG/1
4 TABLET, FILM COATED ORAL EVERY 6 HOURS
Qty: 0 | Refills: 0 | Status: DISCONTINUED | OUTPATIENT
Start: 2018-04-06 | End: 2018-04-15

## 2018-04-06 RX ORDER — HEPARIN SODIUM 5000 [USP'U]/ML
5000 INJECTION INTRAVENOUS; SUBCUTANEOUS EVERY 8 HOURS
Qty: 0 | Refills: 0 | Status: DISCONTINUED | OUTPATIENT
Start: 2018-04-06 | End: 2018-04-08

## 2018-04-06 RX ORDER — DOCUSATE SODIUM 100 MG
100 CAPSULE ORAL THREE TIMES A DAY
Qty: 0 | Refills: 0 | Status: DISCONTINUED | OUTPATIENT
Start: 2018-04-06 | End: 2018-04-11

## 2018-04-06 RX ORDER — ASPIRIN/CALCIUM CARB/MAGNESIUM 324 MG
81 TABLET ORAL DAILY
Qty: 0 | Refills: 0 | Status: DISCONTINUED | OUTPATIENT
Start: 2018-04-06 | End: 2018-04-09

## 2018-04-06 RX ORDER — SODIUM,POTASSIUM PHOSPHATES 278-250MG
1 POWDER IN PACKET (EA) ORAL ONCE
Qty: 0 | Refills: 0 | Status: COMPLETED | OUTPATIENT
Start: 2018-04-06 | End: 2018-04-06

## 2018-04-06 RX ORDER — SENNA PLUS 8.6 MG/1
2 TABLET ORAL AT BEDTIME
Qty: 0 | Refills: 0 | Status: DISCONTINUED | OUTPATIENT
Start: 2018-04-06 | End: 2018-04-11

## 2018-04-06 RX ORDER — ASPIRIN/CALCIUM CARB/MAGNESIUM 324 MG
300 TABLET ORAL DAILY
Qty: 0 | Refills: 0 | Status: DISCONTINUED | OUTPATIENT
Start: 2018-04-06 | End: 2018-04-06

## 2018-04-06 RX ORDER — ACETAMINOPHEN 500 MG
650 TABLET ORAL EVERY 6 HOURS
Qty: 0 | Refills: 0 | Status: DISCONTINUED | OUTPATIENT
Start: 2018-04-06 | End: 2018-05-01

## 2018-04-06 RX ORDER — MAGNESIUM SULFATE 500 MG/ML
1 VIAL (ML) INJECTION ONCE
Qty: 0 | Refills: 0 | Status: COMPLETED | OUTPATIENT
Start: 2018-04-06 | End: 2018-04-06

## 2018-04-06 RX ADMIN — Medication 100 MILLIGRAM(S): at 22:29

## 2018-04-06 RX ADMIN — Medication 81 MILLIGRAM(S): at 18:11

## 2018-04-06 RX ADMIN — HEPARIN SODIUM 5000 UNIT(S): 5000 INJECTION INTRAVENOUS; SUBCUTANEOUS at 18:20

## 2018-04-06 RX ADMIN — Medication 1 PACKET(S): at 18:11

## 2018-04-06 RX ADMIN — Medication 100 GRAM(S): at 13:54

## 2018-04-06 RX ADMIN — ATORVASTATIN CALCIUM 80 MILLIGRAM(S): 80 TABLET, FILM COATED ORAL at 22:29

## 2018-04-06 RX ADMIN — SENNA PLUS 2 TABLET(S): 8.6 TABLET ORAL at 22:29

## 2018-04-06 RX ADMIN — Medication 100 MILLIGRAM(S): at 18:11

## 2018-04-06 NOTE — PROGRESS NOTE ADULT - SUBJECTIVE AND OBJECTIVE BOX
Transfer Note from Neurosurgery to [facility]:    CC: Patient is a 52y old  Male who presents with a chief complaint of CVA (05 Apr 2018 15:24)    SUBJECTIVE / INTERVAL HPI: Patient seen and examined at bedside. Pt has no acute complaints, continues to have R sided deficits: dysphagia, facial droop    VITAL SIGNS:  Vital Signs Last 24 Hrs  T(C): 36.9 (06 Apr 2018 09:19), Max: 36.9 (06 Apr 2018 09:19)  T(F): 98.4 (06 Apr 2018 09:19), Max: 98.4 (06 Apr 2018 09:19)  HR: 66 (06 Apr 2018 14:00) (58 - 84)  BP: 153/87 (06 Apr 2018 11:30) (153/87 - 157/87)  BP(mean): --  RR: 21 (06 Apr 2018 14:00) (12 - 26)  SpO2: 98% (06 Apr 2018 14:00) (97% - 100%)    PHYSICAL EXAM:    General: WDWN  HEENT: NC/AT; PERRL, anicteric sclera; MMM  Neck: supple  Cardiovascular: +S1/S2; RRR  Respiratory: CTA B/L; no W/R/R  Gastrointestinal: soft, NT/ND; +BSx4  Extremities: WWP; no edema, clubbing or cyanosis  Vascular: 2+ radial, DP/PT pulses B/L  Neurological: alert awake oriented; no focal deficits    MEDICATIONS:  MEDICATIONS  (STANDING):  aspirin Suppository 300 milliGRAM(s) Rectal daily  atorvastatin 80 milliGRAM(s) Oral at bedtime  dextrose 5%. 1000 milliLiter(s) (50 mL/Hr) IV Continuous <Continuous>  dextrose 50% Injectable 12.5 Gram(s) IV Push once  docusate sodium 100 milliGRAM(s) Oral three times a day  heparin  Injectable 5000 Unit(s) SubCutaneous every 8 hours  insulin lispro (HumaLOG) corrective regimen sliding scale   SubCutaneous every 6 hours  potassium phosphate / sodium phosphate powder 1 Packet(s) Oral once  senna 2 Tablet(s) Oral at bedtime  sodium chloride 0.9%. 1000 milliLiter(s) (100 mL/Hr) IV Continuous <Continuous>    MEDICATIONS  (PRN):  acetaminophen   Tablet 650 milliGRAM(s) Oral every 6 hours PRN For Temp greater than 38 C (100.4 F)  acetaminophen   Tablet. 650 milliGRAM(s) Oral every 6 hours PRN Mild Pain (1 - 3)  dextrose Gel 1 Dose(s) Oral once PRN Blood Glucose LESS THAN 70 milliGRAM(s)/deciliter  glucagon  Injectable 1 milliGRAM(s) IntraMuscular once PRN Glucose LESS THAN 70 milligrams/deciliter  ondansetron Injectable 4 milliGRAM(s) IV Push every 6 hours PRN Nausea and/or Vomiting      ALLERGIES:  Allergies    Allergy Status Unknown    Intolerances        LABS:                        13.9   7.5   )-----------( 241      ( 06 Apr 2018 06:53 )             41.2     04-06    135  |  101  |  12  ----------------------------<  97  4.2   |  24  |  1.44<H>    Ca    9.0      06 Apr 2018 06:53  Phos  2.5     04-06  Mg     1.8     04-06      PT/INR - ( 05 Apr 2018 12:54 )   PT: 12.6 sec;   INR: 1.13          PTT - ( 05 Apr 2018 12:54 )  PTT:33.4 sec    CAPILLARY BLOOD GLUCOSE      POCT Blood Glucose.: 79 mg/dL (06 Apr 2018 11:55)      RADIOLOGY & ADDITIONAL TESTS: Reviewed. 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 Pt taken to [facility], where stroke code was called with CT head showing left M2 occlusion, no large territory infarct, given tPA and transferred to [facility] for thrombectomy. Pt found to be aphasic, R facial droop, R hemiplegia and left gaze preference with small hematoma over R eye. CTA head showed left M2 occlusion and taken for thrombectomy. Post-thrombectomy, repeat CT showed no hemorrhagic conversion. PT evaluate patient and deemed appropriate for acute rehab, pending formal speech and swallow evaluation and BRANDON.    CC: Patient is a 52y old  Male who presents with a chief complaint of CVA (05 Apr 2018 15:24)    SUBJECTIVE / INTERVAL HPI: Patient seen and examined at bedside. Pt has no acute complaints, continues to have R sided deficits: dysphagia, facial droop, RUE weakness, RLE slight weakness.     VITAL SIGNS:  Vital Signs Last 24 Hrs  T(C): 36.9 (06 Apr 2018 09:19), Max: 36.9 (06 Apr 2018 09:19)  T(F): 98.4 (06 Apr 2018 09:19), Max: 98.4 (06 Apr 2018 09:19)  HR: 66 (06 Apr 2018 14:00) (58 - 84)  BP: 153/87 (06 Apr 2018 11:30) (153/87 - 157/87)  BP(mean): --  RR: 21 (06 Apr 2018 14:00) (12 - 26)  SpO2: 98% (06 Apr 2018 14:00) (97% - 100%)    PHYSICAL EXAM:    General: sitting in chair, resting comfortably, in NAD  HEENT: EOMI, anicteric, R facial droop, able to move bilateral eyebrows R>L  Neck: supple, no LAD  Cardiovascular: +S1/S2; RRR  Respiratory: CTA B/L; no W/R/R  Gastrointestinal: soft, NT/ND; +BSx4  Extremities: WWP; no edema, clubbing or cyanosis  Vascular: 2+ radial, DP/PT pulses B/L  Neurological: AOx3; +dysphagia, R facial droop, able to move bilateral eyebrows R>L, 5/5 strength LUE and LLE, 0/5 RUE unable to lift right arm, 4/5 RLE strength    MEDICATIONS:  MEDICATIONS  (STANDING):  aspirin Suppository 300 milliGRAM(s) Rectal daily  atorvastatin 80 milliGRAM(s) Oral at bedtime  dextrose 5%. 1000 milliLiter(s) (50 mL/Hr) IV Continuous <Continuous>  dextrose 50% Injectable 12.5 Gram(s) IV Push once  docusate sodium 100 milliGRAM(s) Oral three times a day  heparin  Injectable 5000 Unit(s) SubCutaneous every 8 hours  insulin lispro (HumaLOG) corrective regimen sliding scale   SubCutaneous every 6 hours  potassium phosphate / sodium phosphate powder 1 Packet(s) Oral once  senna 2 Tablet(s) Oral at bedtime  sodium chloride 0.9%. 1000 milliLiter(s) (100 mL/Hr) IV Continuous <Continuous>    MEDICATIONS  (PRN):  acetaminophen   Tablet 650 milliGRAM(s) Oral every 6 hours PRN For Temp greater than 38 C (100.4 F)  acetaminophen   Tablet. 650 milliGRAM(s) Oral every 6 hours PRN Mild Pain (1 - 3)  dextrose Gel 1 Dose(s) Oral once PRN Blood Glucose LESS THAN 70 milliGRAM(s)/deciliter  glucagon  Injectable 1 milliGRAM(s) IntraMuscular once PRN Glucose LESS THAN 70 milligrams/deciliter  ondansetron Injectable 4 milliGRAM(s) IV Push every 6 hours PRN Nausea and/or Vomiting      ALLERGIES:  Allergies    Allergy Status Unknown    Intolerances        LABS:                        13.9   7.5   )-----------( 241      ( 06 Apr 2018 06:53 )             41.2     04-06    135  |  101  |  12  ----------------------------<  97  4.2   |  24  |  1.44<H>    Ca    9.0      06 Apr 2018 06:53  Phos  2.5     04-06  Mg     1.8     04-06      PT/INR - ( 05 Apr 2018 12:54 )   PT: 12.6 sec;   INR: 1.13          PTT - ( 05 Apr 2018 12:54 )  PTT:33.4 sec    CAPILLARY BLOOD GLUCOSE      POCT Blood Glucose.: 79 mg/dL (06 Apr 2018 11:55)      RADIOLOGY & ADDITIONAL TESTS: Reviewed.    04/06 CT:  5 cm area of left frontotemporal cytotoxic edema compatible with acute/subacute ischemia/infarction. No intracranial hemorrhage.  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 Pt taken to [facility], where stroke code was called with CT head showing left M2 occlusion, no large territory infarct, given tPA and transferred to [facility] for thrombectomy. Pt found to be aphasic, R facial droop, R hemiplegia and left gaze preference with small hematoma over R eye. CTA head showed left M2 occlusion and taken for thrombectomy. Post-thrombectomy, repeat CT showed no hemorrhagic conversion. PT evaluate patient and deemed appropriate for acute rehab, pending formal speech and swallow evaluation and BRANDON.    CC: Patient is a 52y old  Male who presents with a chief complaint of CVA (05 Apr 2018 15:24)    SUBJECTIVE / INTERVAL HPI: Patient seen and examined at bedside. Pt has no acute complaints, continues to have R sided deficits: dysphagia, facial droop, RUE weakness, RLE slight weakness.     VITAL SIGNS:  Vital Signs Last 24 Hrs  T(C): 36.9 (06 Apr 2018 09:19), Max: 36.9 (06 Apr 2018 09:19)  T(F): 98.4 (06 Apr 2018 09:19), Max: 98.4 (06 Apr 2018 09:19)  HR: 66 (06 Apr 2018 14:00) (58 - 84)  BP: 153/87 (06 Apr 2018 11:30) (153/87 - 157/87)  BP(mean): --  RR: 21 (06 Apr 2018 14:00) (12 - 26)  SpO2: 98% (06 Apr 2018 14:00) (97% - 100%)    PHYSICAL EXAM:    General: sitting in chair, resting comfortably, in NAD  HEENT: EOMI, anicteric, R facial droop, able to move bilateral eyebrows R>L  Neck: supple, no LAD  Cardiovascular: +S1/S2; RRR  Respiratory: CTA B/L; no W/R/R  Gastrointestinal: soft, NT/ND; +BSx4  Extremities: WWP; no edema, clubbing or cyanosis  Vascular: 2+ radial, DP/PT pulses B/L  Neurological: AOx3; +dysphagia, R facial droop, able to move bilateral eyebrows R>L, 5/5 strength LUE and LLE, 0/5 RUE unable to lift right arm, 4/5 RLE strength    MEDICATIONS:  MEDICATIONS  (STANDING):  aspirin Suppository 300 milliGRAM(s) Rectal daily  atorvastatin 80 milliGRAM(s) Oral at bedtime  dextrose 5%. 1000 milliLiter(s) (50 mL/Hr) IV Continuous <Continuous>  dextrose 50% Injectable 12.5 Gram(s) IV Push once  docusate sodium 100 milliGRAM(s) Oral three times a day  heparin  Injectable 5000 Unit(s) SubCutaneous every 8 hours  insulin lispro (HumaLOG) corrective regimen sliding scale   SubCutaneous every 6 hours  potassium phosphate / sodium phosphate powder 1 Packet(s) Oral once  senna 2 Tablet(s) Oral at bedtime  sodium chloride 0.9%. 1000 milliLiter(s) (100 mL/Hr) IV Continuous <Continuous>    MEDICATIONS  (PRN):  acetaminophen   Tablet 650 milliGRAM(s) Oral every 6 hours PRN For Temp greater than 38 C (100.4 F)  acetaminophen   Tablet. 650 milliGRAM(s) Oral every 6 hours PRN Mild Pain (1 - 3)  dextrose Gel 1 Dose(s) Oral once PRN Blood Glucose LESS THAN 70 milliGRAM(s)/deciliter  glucagon  Injectable 1 milliGRAM(s) IntraMuscular once PRN Glucose LESS THAN 70 milligrams/deciliter  ondansetron Injectable 4 milliGRAM(s) IV Push every 6 hours PRN Nausea and/or Vomiting      ALLERGIES:  Allergies    Allergy Status Unknown    Intolerances        LABS:                        13.9   7.5   )-----------( 241      ( 06 Apr 2018 06:53 )             41.2     04-06    135  |  101  |  12  ----------------------------<  97  4.2   |  24  |  1.44<H>    Ca    9.0      06 Apr 2018 06:53  Phos  2.5     04-06  Mg     1.8     04-06      PT/INR - ( 05 Apr 2018 12:54 )   PT: 12.6 sec;   INR: 1.13          PTT - ( 05 Apr 2018 12:54 )  PTT:33.4 sec    CAPILLARY BLOOD GLUCOSE      POCT Blood Glucose.: 79 mg/dL (06 Apr 2018 11:55)      RADIOLOGY & ADDITIONAL TESTS: Reviewed.    04/06 CT:  5 cm area of left frontotemporal cytotoxic edema compatible with acute/subacute ischemia/infarction. No intracranial hemorrhage.  04/05 CTA:  abrupt cut off L M2 segment superior division c/w occlusion, R P1 segment hypoplastic  04/05 CT head:  L caudate head lacunar infarct indeterminate, L parietal old infarct, post L frontal remote infarct, chronic lacunar infarct L caudate head, no acute abnormality  04/05 LE Doppler: R CFV not examined, no DVT

## 2018-04-06 NOTE — PROGRESS NOTE ADULT - ASSESSMENT
left mca occlusion status post iv tpa and mechanical thrombectomy    hypercoagulable work up   sallie  pt/ot  speech therapy

## 2018-04-06 NOTE — PHYSICAL THERAPY INITIAL EVALUATION ADULT - GAIT DEVIATIONS NOTED, PT EVAL
decreased fannie/decreased step length/slightly increased lateral sway, no lose of balance, fairly steady,

## 2018-04-06 NOTE — PROGRESS NOTE ADULT - SUBJECTIVE AND OBJECTIVE BOX
HEALTH ISSUES - PROBLEM Dx:  Prophylactic measure: Prophylactic measure  Nutrition, metabolism, and development symptoms: Nutrition, metabolism, and development symptoms  MARIELOS (acute kidney injury): MARIELOS (acute kidney injury)  Palpitations: Palpitations  Cerebrovascular accident (CVA) due to occlusion of left middle cerebral artery: Cerebrovascular accident (CVA) due to occlusion of left middle cerebral artery          CHEMOTHERAPY REGIMEN:        Day:                          Diet:  Protocol:                                    IVF:      MEDICATIONS  (STANDING):  aspirin enteric coated 81 milliGRAM(s) Oral daily  atorvastatin 80 milliGRAM(s) Oral at bedtime  dextrose 5%. 1000 milliLiter(s) (50 mL/Hr) IV Continuous <Continuous>  dextrose 50% Injectable 12.5 Gram(s) IV Push once  docusate sodium 100 milliGRAM(s) Oral three times a day  heparin  Injectable 5000 Unit(s) SubCutaneous every 8 hours  insulin lispro (HumaLOG) corrective regimen sliding scale   SubCutaneous every 6 hours  senna 2 Tablet(s) Oral at bedtime    MEDICATIONS  (PRN):  acetaminophen   Tablet 650 milliGRAM(s) Oral every 6 hours PRN For Temp greater than 38 C (100.4 F)  acetaminophen   Tablet. 650 milliGRAM(s) Oral every 6 hours PRN Mild Pain (1 - 3)  dextrose Gel 1 Dose(s) Oral once PRN Blood Glucose LESS THAN 70 milliGRAM(s)/deciliter  glucagon  Injectable 1 milliGRAM(s) IntraMuscular once PRN Glucose LESS THAN 70 milligrams/deciliter  ondansetron Injectable 4 milliGRAM(s) IV Push every 6 hours PRN Nausea and/or Vomiting      Allergies    Allergy Status Unknown    Intolerances        DVT Prophylaxis: [ ] YES [ ] NO      Antibiotics: [ ] YES [ ] NO    Pain Scale (1-10):       Location:    Vital Signs Last 24 Hrs  T(C): 37.6 (06 Apr 2018 18:08), Max: 37.6 (06 Apr 2018 18:08)  T(F): 99.6 (06 Apr 2018 18:08), Max: 99.6 (06 Apr 2018 18:08)  HR: 64 (06 Apr 2018 18:00) (58 - 84)  BP: 153/87 (06 Apr 2018 11:30) (153/87 - 157/87)  BP(mean): --  RR: 16 (06 Apr 2018 18:00) (14 - 26)  SpO2: 99% (06 Apr 2018 18:00) (97% - 100%)    Drug Dosing Weight  Height (cm): 180.34 (05 Apr 2018 09:00)  Weight (kg): 92.3 (05 Apr 2018 09:00)  BMI (kg/m2): 28.4 (05 Apr 2018 09:00)  BSA (m2): 2.12 (05 Apr 2018 09:00)     Physical Exam:  · Constitutional	detailed exam  · Constitutional Details	well-developed; well-groomed  · Eyes	EOMI; PERRL; no drainage or redness  · ENMT Comments	dry mucous membranes  · Respiratory	detailed exam  · Respiratory Comments	normal breath sounds at the lung bases bilaterally  · Cardiovascular	Regular rate & rhythm, normal S1, S2; no murmurs, gallops or rubs; no S3, S4  · Abd-Soft non tender  ·Ext-no edema, clubbing or cyanosis    URINARY CATHETER: [ ] YES [ ] NO     LABS:  CBC Full  -  ( 06 Apr 2018 06:53 )  WBC Count : 7.5 K/uL  Hemoglobin : 13.9 g/dL  Hematocrit : 41.2 %  Platelet Count - Automated : 241 K/uL  Mean Cell Volume : 85.1 fL  Mean Cell Hemoglobin : 28.7 pg  Mean Cell Hemoglobin Concentration : 33.7 g/dL  Auto Neutrophil # : x  Auto Lymphocyte # : x  Auto Monocyte # : x  Auto Eosinophil # : x  Auto Basophil # : x  Auto Neutrophil % : x  Auto Lymphocyte % : x  Auto Monocyte % : x  Auto Eosinophil % : x  Auto Basophil % : x    04-06    135  |  101  |  12  ----------------------------<  97  4.2   |  24  |  1.44<H>    Ca    9.0      06 Apr 2018 06:53  Phos  2.5     04-06  Mg     1.8     04-06      PT/INR - ( 05 Apr 2018 12:54 )   PT: 12.6 sec;   INR: 1.13          PTT - ( 05 Apr 2018 12:54 )  PTT:33.4 sec      CULTURES:    RADIOLOGY & ADDITIONAL STUDIES:

## 2018-04-06 NOTE — PROGRESS NOTE ADULT - SUBJECTIVE AND OBJECTIVE BOX
=================================  NEUROCRITICAL CARE ATTENDING NOTE  =================================    FABRICIO ZEPEDA   [id number]  Summary:  52M with h/o palpitations, last normal at 5:03 a.m., found down by co-worker, taken to [facility].  CT showed L M2 occlusion, given tPA and transferred to [facility] for further care.  CTA showed M2 occlusion, brought to angio suite for mechanical thrombectomy.  Overnight Events: no significant events overnight    PAST MEDICAL & SURGICAL HISTORY: Cerebrovascular accident (CVA) due to occlusion of left middle cerebral artery: Left M2 occlusion Irregular heart beat  Allergies: ?  Home meds:  MVI    PHYSICAL EXAMINATION  T(C): 36.6 ( @ 06:00), Max: 36.8 ( @ 14:26) HR: 58 ( @ 08:00) (58 - 84) BP: 104/57 ( @ 11:30) (104/57 - 133/72) RR: 14 ( @ 02:00) (10 - 26) SpO2: 97% ( @ 08:00) (97% - 100%)  NEUROLOGIC EXAMINATION:  Patient is awake, alert, pupils 3mm equal and reactive to light, no fixed gaze, aphasic, follows commands intermittently, L UE/LE 5/5, R UE 0/5, R LE 1/5 to noxious  GENERAL:  not intubated, not in cardiorespiratory distress  EENT: anicteric  CARDIOVASC:  (+) S1 S2, bradycardic rate and regular rhythm  PULMONARY:  clear to auscultation bilaterally  ABDOMEN:  soft, nontender, with normoactive bowel sounds  EXTREMITIES:  no edema  SKIN:  no rash    LABS:  CAPILLARY BLOOD GLUCOSE 86 119 66 143 71 81                        13.9   7.5   )-----------( 241      ( 2018 06:53 )             41.2     135  |  101  |  12  ----------------------------<  97  4.2   |  24  |  1.44<H>    Ca    9.0      2018 06:53  Phos  2.5     -  Mg     1.8     - @ 07:01  -   @ 07:00  IN: 1975 mL / OUT: 2210 mL / NET: -235 mL    Bacteriology:  CSF studies:  EEG:  Neuroimagin/06 CT:  hypodensity L frontal L insular region L globus pallidus   CTA:  abrupt cut off L M2 segment superior division c/w occlusion, R P1 segment hpoplastic   CT head:  L caudate head lacunar infarct indeterminate, L parietal old infarct, post L frontal remote infarct, chronic lacunar infarct L caudate head, no acute abnormality  Other imagin/05 LE Doppler: R CFV not examined, no DVT    MEDICATIONS: atorvastatin 80mg HS mod ISS pantoprazole 40 IV daily     IV FLUIDS: NS@75cc/hr  DRIPS:  DIET: NPO  Lines:  Drains:      CODE STATUS:  full code                       GOALS OF CARE:  aggressive                      DISPOSITION:  ICU  NIHSS 20, tPA given, thrombectomy with full recanalization =================================  NEUROCRITICAL CARE ATTENDING NOTE  =================================    FABRICIO EZPEDA   [id number]  Summary:  52M with h/o palpitations, last normal at 5:03 a.m., found down by co-worker, taken to [facility].  CT showed L M2 occlusion, given tPA and transferred to [facility] for further care.  CTA showed M2 occlusion, brought to angio suite for mechanical thrombectomy.  Overnight Events: no significant events overnight, post-tPA scan no hemorrhagic conversion    PAST MEDICAL & SURGICAL HISTORY: Cerebrovascular accident (CVA) due to occlusion of left middle cerebral artery: Left M2 occlusion Irregular heart beat  Allergies: ?  Home meds:  MVI    PHYSICAL EXAMINATION  T(C): 36.6 ( @ 06:00), Max: 36.8 ( @ 14:26) HR: 58 ( @ 08:00) (58 - 84) BP: 104/57 ( @ 11:30) (104/57 - 133/72) RR: 14 ( @ 02:00) (10 - 26) SpO2: 97% ( @ 08:00) (97% - 100%)  NEUROLOGIC EXAMINATION:  Patient is awake, alert, pupils 3mm equal and reactive to light, no fixed gaze, aphasic, fully oriented with options, follows commands intermittently, R facial droop, L UE/LE 5/5, R UE 0/5, R LE 4/5 to noxious  GENERAL:  not intubated, not in cardiorespiratory distress  EENT: anicteric  CARDIOVASC:  (+) S1 S2, bradycardic rate and regular rhythm  PULMONARY:  clear to auscultation bilaterally  ABDOMEN:  soft, nontender, with normoactive bowel sounds  EXTREMITIES:  no edema  SKIN:  no rash    LABS:  CAPILLARY BLOOD GLUCOSE 86 119 66 143 71 81                        13.9   7.5   )-----------( 241      ( 2018 06:53 )             41.2     135  |  101  |  12  ----------------------------<  97  4.2   |  24  |  1.44<H> (1.38)    Ca    9.0      2018 06:53  Phos  2.5       Mg     1.8      @ 07:01  -   @ 07:00  IN: 1975 mL / OUT: 2210 mL / NET: -235 mL    Bacteriology:  CSF studies:  EEG:  Neuroimagin/06 CT:  hypodensity L frontal L insular region L globus pallidus   CTA:  abrupt cut off L M2 segment superior division c/w occlusion, R P1 segment hpoplastic   CT head:  L caudate head lacunar infarct indeterminate, L parietal old infarct, post L frontal remote infarct, chronic lacunar infarct L caudate head, no acute abnormality  Other imagin/05 LE Doppler: R CFV not examined, no DVT    MEDICATIONS: atorvastatin 80mg HS mod ISS pantoprazole 40 IV daily     IV FLUIDS: NS@100cc/hr  DRIPS:  DIET: NPO  Lines: [place], [place]  Drains:      CODE STATUS:  full code                       GOALS OF CARE:  aggressive                      DISPOSITION:  ICU  NIHSS 20, tPA given, thrombectomy with full recanalization

## 2018-04-06 NOTE — PROGRESS NOTE ADULT - ASSESSMENT
slightly better, sitting in chair    hypercoagulable w/u negative so far.    Pt's daughter had a provoked DVD post trauma

## 2018-04-06 NOTE — OCCUPATIONAL THERAPY INITIAL EVALUATION ADULT - MD ORDER
Per chart, 52M with h/o palpitations, last normal at 5:03 a.m., found down by co-worker, taken to [facility].  CT showed L M2 occlusion, given tPA and transferred to [facility] for further care.  CTA showed M2 occlusion, brought to angio suite for mechanical thrombectomy.

## 2018-04-06 NOTE — SWALLOW BEDSIDE ASSESSMENT ADULT - SWALLOW EVAL: DIAGNOSIS
Patient presents with mild oral dysphagia characterized by reduced labial seal with mild anterior loss 2/2 reduced labial strength.  Pharyngeal phase of swallow was within functional limits with no overt signs & symptoms of airway protection deficits across consistencies tested.  Patient also presents with an expressive aphasia, will be seen for more comprehensive speech-language evaluation.

## 2018-04-06 NOTE — PROGRESS NOTE ADULT - PROBLEM SELECTOR PLAN 3
Pt with unknown Cr baseline, however with Cr 1.38 on admission. Likely 2/2 IV contrast  - continue to trend BMP  - c/w NS @ 100cc/hr

## 2018-04-06 NOTE — PHYSICAL THERAPY INITIAL EVALUATION ADULT - CRITERIA FOR SKILLED THERAPEUTIC INTERVENTIONS
therapy frequency/rehab potential/anticipated discharge recommendation/functional limitations in following categories/impairments found

## 2018-04-06 NOTE — OCCUPATIONAL THERAPY INITIAL EVALUATION ADULT - PLANNED THERAPY INTERVENTIONS, OT EVAL
parent/caregiver training.../cognitive, visual perceptual/motor coordination training/ADL retraining/IADL retraining/balance training/fine motor coordination training/bed mobility training/ROM/strengthening/transfer training

## 2018-04-06 NOTE — SWALLOW BEDSIDE ASSESSMENT ADULT - SPECIFY REASON(S)
Pt admitted after being found down & foaming at mouth at work on 4/5, with CTA showing M2 occlusion.  Pt s/p IV tPA, & [facility]. thrombectomy 4/5/18.

## 2018-04-06 NOTE — PHYSICAL THERAPY INITIAL EVALUATION ADULT - GENERAL OBSERVATIONS, REHAB EVAL
Pt received supine in bed with +EKG, +left radial A-line, +[place],,+IV, +b/l SCD, NAD, family present. Pt left as found, lines intact, family present, RN awares. Pt has no complaint through out session

## 2018-04-06 NOTE — PROGRESS NOTE ADULT - SUBJECTIVE AND OBJECTIVE BOX
HPI:  52M with h/o palpitations, last normal at 5:03 a.m., found down by co-worker, taken to [facility].  CT showed L M2 occlusion, given tPA and transferred to [facility] for further care.  CTA showed M2 occlusion, brought to angio suite for mechanical thrombectomy.    Overnight Events: no significant events overnight, post-tPA scan no hemorrhagic conversion      Vital Signs Last 24 Hrs  T(C): 36.9 (06 Apr 2018 09:19), Max: 36.9 (06 Apr 2018 09:19)  T(F): 98.4 (06 Apr 2018 09:19), Max: 98.4 (06 Apr 2018 09:19)  HR: 62 (06 Apr 2018 11:30) (58 - 84)  BP: 153/87 (06 Apr 2018 11:30) (153/87 - 157/87)  BP(mean): --  RR: 14 (06 Apr 2018 11:00) (11 - 26)  SpO2: 98% (06 Apr 2018 11:30) (97% - 100%)    I&O's Detail    05 Apr 2018 07:01  -  06 Apr 2018 07:00  --------------------------------------------------------  IN:    sodium chloride 0.9%: 475 mL    sodium chloride 0.9%.: 1500 mL  Total IN: 1975 mL    OUT:    Indwelling Catheter - Urethral: 2210 mL  Total OUT: 2210 mL    Total NET: -235 mL      06 Apr 2018 07:01  -  06 Apr 2018 12:53  --------------------------------------------------------  IN:    sodium chloride 0.9%.: 300 mL  Total IN: 300 mL    OUT:    Indwelling Catheter - Urethral: 250 mL  Total OUT: 250 mL    Total NET: 50 mL        I&O's Summary    05 Apr 2018 07:01  -  06 Apr 2018 07:00  --------------------------------------------------------  IN: 1975 mL / OUT: 2210 mL / NET: -235 mL    06 Apr 2018 07:01  -  06 Apr 2018 12:53  --------------------------------------------------------  IN: 300 mL / OUT: 250 mL / NET: 50 mL        PHYSICAL EXAM:  Neurological:  Awake and alert, expressive aphasia, but oriented x3 with options, NAD, FC  PERRL, EOMI, R facial droop  LUE/LLE 5/5, RUE plegic, RLE 4/5  SILT throughout b/l  Incision/wound: R groin incision clean, dry and intact- no hemorrhage or hematoma- groin dressing removed this am     TUBES/LINES:  [] CVC  [] A-line  [] Lumbar Drain  [] Ventriculostomy  [] Other    DIET:  [x] NPO  [] Mechanical  [] Tube feeds    LABS:                        13.9   7.5   )-----------( 241      ( 06 Apr 2018 06:53 )             41.2     04-06    135  |  101  |  12  ----------------------------<  97  4.2   |  24  |  1.44<H>    Ca    9.0      06 Apr 2018 06:53  Phos  2.5     04-06  Mg     1.8     04-06      PT/INR - ( 05 Apr 2018 12:54 )   PT: 12.6 sec;   INR: 1.13          PTT - ( 05 Apr 2018 12:54 )  PTT:33.4 sec        CAPILLARY BLOOD GLUCOSE      POCT Blood Glucose.: 79 mg/dL (06 Apr 2018 11:55)  POCT Blood Glucose.: 86 mg/dL (06 Apr 2018 06:32)  POCT Blood Glucose.: 119 mg/dL (06 Apr 2018 00:20)  POCT Blood Glucose.: 66 mg/dL (05 Apr 2018 23:08)  POCT Blood Glucose.: 143 mg/dL (05 Apr 2018 18:34)  POCT Blood Glucose.: 71 mg/dL (05 Apr 2018 18:05)      Drug Levels: [] N/A    CSF Analysis: [] N/A      Allergies    Allergy Status Unknown    Intolerances      MEDICATIONS:  Antibiotics:    Neuro:  acetaminophen   Tablet 650 milliGRAM(s) Oral every 6 hours PRN  acetaminophen   Tablet. 650 milliGRAM(s) Oral every 6 hours PRN  aspirin Suppository 300 milliGRAM(s) Rectal daily  ondansetron Injectable 4 milliGRAM(s) IV Push every 6 hours PRN    Anticoagulation:  heparin  Injectable 5000 Unit(s) SubCutaneous every 8 hours    OTHER:  atorvastatin 80 milliGRAM(s) Oral at bedtime  dextrose 50% Injectable 12.5 Gram(s) IV Push once  dextrose Gel 1 Dose(s) Oral once PRN  docusate sodium 100 milliGRAM(s) Oral three times a day  glucagon  Injectable 1 milliGRAM(s) IntraMuscular once PRN  insulin lispro (HumaLOG) corrective regimen sliding scale   SubCutaneous every 6 hours  senna 2 Tablet(s) Oral at bedtime    IVF:  dextrose 5%. 1000 milliLiter(s) IV Continuous <Continuous>  magnesium sulfate  IVPB 1 Gram(s) IV Intermittent once  potassium phosphate / sodium phosphate powder 1 Packet(s) Oral once  sodium chloride 0.9%. 1000 milliLiter(s) IV Continuous <Continuous>    CULTURES:    RADIOLOGY & ADDITIONAL TESTS:      ASSESSMENT:  52y Male s/p with left M2 occlusion, s/p tPA and mechanical thrombectomy admitted to MICU for observation work up, POD#1, NIHSS 20      STROKE  No h/o HF  Unknown h/o HF  Family history of cerebrovascular accident (CVA) in father (Father)  Handoff  Cerebrovascular accident (CVA) due to occlusion of left middle cerebral artery  Irregular heart beat  Cerebrovascular accident (CVA) due to occlusion of left middle cerebral artery  Cerebrovascular accident (CVA) due to occlusion of left middle cerebral artery  Cerebrovascular accident (CVA) due to occlusion of left middle cerebral artery  Cerebral angiography with thrombolysis or thrombectomy if indicated      PLAN:  NEURO:  -neuro checks  -pain control  -post-tPA scan stable- no hemorrhage  -start aspirin 81 daily  -start SQH tonight  -lipitor 80 daily  -f/u hypercoaguable workup   -pending MRI eventually  -OOB/PT/OT    CARDIOVASCULAR:  --150  -TTE done, EF normal, no PFO  -f/u BRANDON    PULMONARY:  -room air  -IS    RENAL:  -IVF  -shepherd out     GI:  -NPO   -dysphagia screen  -bowel regimen    HEME:  -h/h stable  -hematology consulted for hypercoaguable workup     ID:  -afebrile    ENDO:  -ISS    DVT PROPHYLAXIS:  [x] Venodynes                                [] Heparin/Lovenox    -d/w Dr. Mota, Dr. Zaragoza and SICU team HPI:  52M with h/o palpitations, last normal at 5:03 a.m., found down by co-worker, taken to [facility].  CT showed L M2 occlusion, given tPA and transferred to [facility] for further care.  CTA showed M2 occlusion, brought to angio suite for mechanical thrombectomy.    Overnight Events: no significant events overnight, post-tPA scan no hemorrhagic conversion      Vital Signs Last 24 Hrs  T(C): 36.9 (06 Apr 2018 09:19), Max: 36.9 (06 Apr 2018 09:19)  T(F): 98.4 (06 Apr 2018 09:19), Max: 98.4 (06 Apr 2018 09:19)  HR: 62 (06 Apr 2018 11:30) (58 - 84)  BP: 153/87 (06 Apr 2018 11:30) (153/87 - 157/87)  BP(mean): --  RR: 14 (06 Apr 2018 11:00) (11 - 26)  SpO2: 98% (06 Apr 2018 11:30) (97% - 100%)    I&O's Detail    05 Apr 2018 07:01  -  06 Apr 2018 07:00  --------------------------------------------------------  IN:    sodium chloride 0.9%: 475 mL    sodium chloride 0.9%.: 1500 mL  Total IN: 1975 mL    OUT:    Indwelling Catheter - Urethral: 2210 mL  Total OUT: 2210 mL    Total NET: -235 mL      06 Apr 2018 07:01  -  06 Apr 2018 12:53  --------------------------------------------------------  IN:    sodium chloride 0.9%.: 300 mL  Total IN: 300 mL    OUT:    Indwelling Catheter - Urethral: 250 mL  Total OUT: 250 mL    Total NET: 50 mL        I&O's Summary    05 Apr 2018 07:01  -  06 Apr 2018 07:00  --------------------------------------------------------  IN: 1975 mL / OUT: 2210 mL / NET: -235 mL    06 Apr 2018 07:01  -  06 Apr 2018 12:53  --------------------------------------------------------  IN: 300 mL / OUT: 250 mL / NET: 50 mL        PHYSICAL EXAM:  Neurological:  Awake and alert, expressive aphasia, but oriented x3 with options, NAD, FC  PERRL, EOMI, R facial droop  LUE/LLE 5/5, RUE plegic, RLE 4/5  SILT throughout b/l  Incision/wound: R groin incision clean, dry and intact- no hemorrhage or hematoma- groin dressing removed this am     TUBES/LINES:  [] CVC  [] A-line  [] Lumbar Drain  [] Ventriculostomy  [] Other    DIET:  [x] NPO  [] Mechanical  [] Tube feeds    LABS:                        13.9   7.5   )-----------( 241      ( 06 Apr 2018 06:53 )             41.2     04-06    135  |  101  |  12  ----------------------------<  97  4.2   |  24  |  1.44<H>    Ca    9.0      06 Apr 2018 06:53  Phos  2.5     04-06  Mg     1.8     04-06      PT/INR - ( 05 Apr 2018 12:54 )   PT: 12.6 sec;   INR: 1.13          PTT - ( 05 Apr 2018 12:54 )  PTT:33.4 sec        CAPILLARY BLOOD GLUCOSE      POCT Blood Glucose.: 79 mg/dL (06 Apr 2018 11:55)  POCT Blood Glucose.: 86 mg/dL (06 Apr 2018 06:32)  POCT Blood Glucose.: 119 mg/dL (06 Apr 2018 00:20)  POCT Blood Glucose.: 66 mg/dL (05 Apr 2018 23:08)  POCT Blood Glucose.: 143 mg/dL (05 Apr 2018 18:34)  POCT Blood Glucose.: 71 mg/dL (05 Apr 2018 18:05)      Drug Levels: [] N/A    CSF Analysis: [] N/A      Allergies    Allergy Status Unknown    Intolerances      MEDICATIONS:  Antibiotics:    Neuro:  acetaminophen   Tablet 650 milliGRAM(s) Oral every 6 hours PRN  acetaminophen   Tablet. 650 milliGRAM(s) Oral every 6 hours PRN  aspirin Suppository 300 milliGRAM(s) Rectal daily  ondansetron Injectable 4 milliGRAM(s) IV Push every 6 hours PRN    Anticoagulation:  heparin  Injectable 5000 Unit(s) SubCutaneous every 8 hours    OTHER:  atorvastatin 80 milliGRAM(s) Oral at bedtime  dextrose 50% Injectable 12.5 Gram(s) IV Push once  dextrose Gel 1 Dose(s) Oral once PRN  docusate sodium 100 milliGRAM(s) Oral three times a day  glucagon  Injectable 1 milliGRAM(s) IntraMuscular once PRN  insulin lispro (HumaLOG) corrective regimen sliding scale   SubCutaneous every 6 hours  senna 2 Tablet(s) Oral at bedtime    IVF:  dextrose 5%. 1000 milliLiter(s) IV Continuous <Continuous>  magnesium sulfate  IVPB 1 Gram(s) IV Intermittent once  potassium phosphate / sodium phosphate powder 1 Packet(s) Oral once  sodium chloride 0.9%. 1000 milliLiter(s) IV Continuous <Continuous>    CULTURES:    RADIOLOGY & ADDITIONAL TESTS:      ASSESSMENT:  52y Male s/p with left M2 occlusion, s/p tPA and mechanical thrombectomy admitted to MICU for observation work up, POD#1, NIHSS 20      STROKE  No h/o HF  Unknown h/o HF  Family history of cerebrovascular accident (CVA) in father (Father)  Handoff  Cerebrovascular accident (CVA) due to occlusion of left middle cerebral artery  Irregular heart beat  Cerebrovascular accident (CVA) due to occlusion of left middle cerebral artery  Cerebrovascular accident (CVA) due to occlusion of left middle cerebral artery  Cerebrovascular accident (CVA) due to occlusion of left middle cerebral artery  Cerebral angiography with thrombolysis or thrombectomy if indicated      PLAN:  NEURO:  -neuro checks  -pain control  -post-tPA scan stable- no hemorrhage  -start aspirin 81 daily  -start SQH tonight  -lipitor 80 daily  -f/u hypercoaguable workup   -stroke core measures   -pending MRI eventually  -OOB/PT/OT    CARDIOVASCULAR:  --150  -TTE done, EF normal, no PFO  -f/u BRANDON    PULMONARY:  -room air  -IS    RENAL:  -IVF  -shepherd out     GI:  -NPO   -dysphagia screen  -bowel regimen    HEME:  -h/h stable  -hematology consulted for hypercoaguable workup     ID:  -afebrile    ENDO:  -ISS    DVT PROPHYLAXIS:  [x] Venodynes                                [] Heparin/Lovenox    -d/w Dr. Mota, Dr. Zaragoza and SICU team

## 2018-04-06 NOTE — OCCUPATIONAL THERAPY INITIAL EVALUATION ADULT - FINE MOTOR COORDINATION, LEFT HAND THUMB/FINGER OPPOSITION SKILLS, OT EVAL
NURSING DISCHARGE NOTE  Assumed pt this morning. Jaci Toney. Denied any c/o pain/ discomfort , nausea or BM. Discharged home after verbal and written instructions given. Pt chose to walk to ER exit by herself. Belongings with patient. normal performance

## 2018-04-06 NOTE — OCCUPATIONAL THERAPY INITIAL EVALUATION ADULT - GENERAL OBSERVATIONS, REHAB EVAL
Ambidextrous, R>L. Chart reviewed, patient cleared for OT eval by GABE Purvis. Received semi-supine, NAD, +SCDs, +shepherd, +A-line, +tele, +IV, denies pain.

## 2018-04-06 NOTE — PROGRESS NOTE ADULT - ASSESSMENT
53yo M with no PMH presenting with acute left M2 occlusion from [facility] (s/p tPA), now s/p thrombectomy, pending BARNDON and S&S eval.

## 2018-04-06 NOTE — PROGRESS NOTE ADULT - SUBJECTIVE AND OBJECTIVE BOX
NEUROSURGERY Transfer Note:    Transfer from: NSICU -> SDU ([facility])   Transfer to:  (  ) Surgery    (  ) Telemetry    (X) Medicine    (  ) Palliative    (  ) Stroke Unit    (  ) _______________    HPI:  52 year old ambidextrous (right  hand preference) who has been under the care of cardiologist in [facility] (Dr. Ding) for "heart palpitations, not on any medications.  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 for further care.  Upon arrival patient noted to be aphasic, right facial droop, right hemiplegia, and left gaze preference.  A small hematoma note above his right eye.  CTA left M2 occlusion taken to angio suite for mechanical thrombectomy. (2018 10:00)    NSICU COURSE:  Post angio course has been uncomplicated.    CT:  hypodensity L frontal L insular region L globus pallidus   CTA:  abrupt cut off L M2 segment superior division c/w occlusion, R P1 segment hpoplastic   CT head:  L caudate head lacunar infarct indeterminate, L parietal old infarct, post L frontal remote infarct, chronic lacunar infarct L caudate head, no acute abnormality  Other imagin/05 LE Doppler: R CFV not examined, no DVT    < from: CT Head No Cont (18 @ 05:44) >  IMPRESSION: There is an approximately 5 cm area of left frontotemporal cytotoxic edema compatible with acute/subacute ischemia/infarction. No intracranial hemorrhage.    Thank you for allowing us to participate in the care of your patient.    Dictated and Authenticated by: Mitchell Still MD 2018 6:18 AM   Eastern Time ([place] & [place])    The above report was submitted by the [facility] attending radiologist and   copied to [facility] by resident Dr. Otto.        "Thank you for the opportunity to participate in the care of this   patient."    RYDER OTTO M.D., RADIOLOGY RESIDENT  This document has been electronically signed.  NATALIA CRESPO M.D., ATTENDING RADIOLOGIST  This document has been electronically signed. 2018  8:22AM    < end of copied text >      PAST MEDICAL & SURGICAL HISTORY:  Cerebrovascular accident (CVA) due to occlusion of left middle cerebral artery: Left M2 occlusion  Irregular heart beat    Allergies    Allergy Status Unknown    Intolerances      MEDICATIONS  (STANDING):  aspirin Suppository 300 milliGRAM(s) Rectal daily  atorvastatin 80 milliGRAM(s) Oral at bedtime  dextrose 5%. 1000 milliLiter(s) (50 mL/Hr) IV Continuous <Continuous>  dextrose 50% Injectable 12.5 Gram(s) IV Push once  docusate sodium 100 milliGRAM(s) Oral three times a day  heparin  Injectable 5000 Unit(s) SubCutaneous every 8 hours  insulin lispro (HumaLOG) corrective regimen sliding scale   SubCutaneous every 6 hours  potassium phosphate / sodium phosphate powder 1 Packet(s) Oral once  senna 2 Tablet(s) Oral at bedtime  sodium chloride 0.9%. 1000 milliLiter(s) (100 mL/Hr) IV Continuous <Continuous>    MEDICATIONS  (PRN):  acetaminophen   Tablet 650 milliGRAM(s) Oral every 6 hours PRN For Temp greater than 38 C (100.4 F)  acetaminophen   Tablet. 650 milliGRAM(s) Oral every 6 hours PRN Mild Pain (1 - 3)  dextrose Gel 1 Dose(s) Oral once PRN Blood Glucose LESS THAN 70 milliGRAM(s)/deciliter  glucagon  Injectable 1 milliGRAM(s) IntraMuscular once PRN Glucose LESS THAN 70 milligrams/deciliter  ondansetron Injectable 4 milliGRAM(s) IV Push every 6 hours PRN Nausea and/or Vomiting        ASSESSMENT/PLAN: 52y Male w/ acute cerebrovascular accident, L M2 occlusion, s/p thrombectomy (2018, Dr. Mota) s/p TPA  2.  hyponatremia  3.  arrhythmia  4.  elevated creatinine etio     Neurologic: Neurochecks q4h, PRN pain meds. Stroke measures have been completed. Statin started. Continue Atorvastatin. Hyponatremia, permissive  Respiratory: RA, satting well  Cardiovascular: -150 BRANDON completed.  Gastrointestinal/Nutrition:   Genitourinary/Renal:  Hematologic: Pending finalization of hypercoagulable workup Antithrombin III Antigen: 22 mg/dL (18 @ 21:52). Dr. Rodriguez following.   Infectious Disease: Afebrile, no leukocytosis.   Endocrine: ISS     Disposition:      For Follow-Up:      Vital Signs Last 24 Hrs  T(C): 36.9 (2018 09:19), Max: 36.9 (2018 09:19)  T(F): 98.4 (2018 09:19), Max: 98.4 (2018 09:19)  HR: 66 (2018 14:00) (58 - 84)  BP: 153/87 (2018 11:30) (153/87 - 157/87)  BP(mean): --  RR: 21 (2018 14:00) (12 - 26)  SpO2: 98% (2018 14:00) (97% - 100%)  I&O's Summary    2018 07:  -  2018 07:00  --------------------------------------------------------  IN: 1975 mL / OUT: 2210 mL / NET: -235 mL    2018 07:  -  2018 14:32  --------------------------------------------------------  IN: 300 mL / OUT: 250 mL / NET: 50 mL        LABS                                            13.9                  Neurophils% (auto):   x      ( @ 06:53):    7.5  )-----------(241          Lymphocytes% (auto):  x                                             41.2                   Eosinphils% (auto):   x        Manual%: Neutrophils x    ; Lymphocytes x    ; Eosinophils x    ; Bands%: x    ; Blasts x                                    135    |  101    |  12                  Calcium: 9.0   / iCa: x      ( @ 06:53)    ----------------------------<  97        Magnesium: 1.8                              4.2     |  24     |  1.44             Phosphorous: 2.5 NEUROSURGERY Transfer Note:    Transfer from: NSICU -> SDU ([facility])   Transfer to:  (  ) Surgery    (  ) Telemetry    () Medicine    (  ) Palliative    (  ) Stroke Unit    ( X ) Neurology    HPI:  52 year old ambidextrous (right  hand preference) who has been under the care of cardiologist in [facility] (Dr. Ding) for "heart palpitations, not on any medications.  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 for further care.  Upon arrival patient noted to be aphasic, right facial droop, right hemiplegia, and left gaze preference.  A small hematoma note above his right eye.  CTA left M2 occlusion taken to angio suite for mechanical thrombectomy. (2018 10:00)    NSICU COURSE:  Post angio course has been uncomplicated. no significant events overnight, post-tPA scan no hemorrhagic conversion. Evaluated by PT recommended for AR placement. Pending formal S&S screen. BRANDON.    IMAGIN/06 CT:  5 cm area of left frontotemporal cytotoxic edema compatible with acute/subacute ischemia/infarction. No intracranial hemorrhage.   CTA:  abrupt cut off L M2 segment superior division c/w occlusion, R P1 segment hpoplastic   CT head:  L caudate head lacunar infarct indeterminate, L parietal old infarct, post L frontal remote infarct, chronic lacunar infarct L caudate head, no acute abnormality  Other imagin/05 LE Doppler: R CFV not examined, no DVT    PAST MEDICAL & SURGICAL HISTORY:  Cerebrovascular accident (CVA) due to occlusion of left middle cerebral artery: Left M2 occlusion  Irregular heart beat    Home Medications:  Multiple Vitamins oral capsule: 1 cap(s) orally once a day (2018 10:48)    Allergies  Allergy Status Unknown    Intolerances    MEDICATIONS  (STANDING):  aspirin Suppository 300 milliGRAM(s) Rectal daily  atorvastatin 80 milliGRAM(s) Oral at bedtime  dextrose 5%. 1000 milliLiter(s) (50 mL/Hr) IV Continuous <Continuous>  dextrose 50% Injectable 12.5 Gram(s) IV Push once  docusate sodium 100 milliGRAM(s) Oral three times a day  heparin  Injectable 5000 Unit(s) SubCutaneous every 8 hours  insulin lispro (HumaLOG) corrective regimen sliding scale   SubCutaneous every 6 hours  potassium phosphate / sodium phosphate powder 1 Packet(s) Oral once  senna 2 Tablet(s) Oral at bedtime  sodium chloride 0.9%. 1000 milliLiter(s) (100 mL/Hr) IV Continuous <Continuous>    MEDICATIONS  (PRN):  acetaminophen   Tablet 650 milliGRAM(s) Oral every 6 hours PRN For Temp greater than 38 C (100.4 F)  acetaminophen   Tablet. 650 milliGRAM(s) Oral every 6 hours PRN Mild Pain (1 - 3)  dextrose Gel 1 Dose(s) Oral once PRN Blood Glucose LESS THAN 70 milliGRAM(s)/deciliter  glucagon  Injectable 1 milliGRAM(s) IntraMuscular once PRN Glucose LESS THAN 70 milligrams/deciliter  ondansetron Injectable 4 milliGRAM(s) IV Push every 6 hours PRN Nausea and/or Vomiting      Vital Signs Last 24 Hrs  T(C): 36.9 (2018 09:19), Max: 36.9 (2018 09:19)  T(F): 98.4 (2018 09:19), Max: 98.4 (2018 09:19)  HR: 66 (2018 14:00) (58 - 84)  BP: 153/87 (2018 11:30) (153/87 - 157/87)  BP(mean): --  RR: 21 (2018 14:00) (12 - 26)  SpO2: 98% (2018 14:00) (97% - 100%)  I&O's Summary    2018 07:01  -  2018 07:00  --------------------------------------------------------  IN: 1975 mL / OUT: 2210 mL / NET: -235 mL    2018 07:01  -  2018 14:32  --------------------------------------------------------  IN: 300 mL / OUT: 250 mL / NET: 50 mL        LABS                                            13.9                  Neurophils% (auto):   x      ( @ 06:53):    7.5  )-----------(241          Lymphocytes% (auto):  x                                             41.2                   Eosinphils% (auto):   x        Manual%: Neutrophils x    ; Lymphocytes x    ; Eosinophils x    ; Bands%: x    ; Blasts x                                    135    |  101    |  12                  Calcium: 9.0   / iCa: x      ( @ 06:53)    ----------------------------<  97        Magnesium: 1.8                              4.2     |  24     |  1.44             Phosphorous: 2.5          PHYSICAL EXAM:  Neurological:  Awake and alert, expressive aphasia, but oriented x3 with options, NAD, FC  PERRL, EOMI, R facial droop  LUE/LLE 5/5, RUE plegic, RLE 4/5  SILT throughout b/l  Incision/wound: R groin incision clean, dry and intact- no hemorrhage or hematoma- groin dressing removed this am       ASSESSMENT/PLAN: 52y Male w/ acute cerebrovascular accident, L M2 occlusion, s/p thrombectomy (2018, Dr. Mota) s/p TPA    Neurologic: Neurochecks q4h, PRN pain meds. Stroke measures have been completed. Statin started. Continue Atorvastatin. Hyponatremia, permissive, asymptomatic. No supplementation. Pending MRI.   Respiratory: RA, satting well.  Cardiovascular: -150. BRANDON pending.  Gastrointestinal/Nutrition: NPO until S&S re-eval. Ordered  Genitourinary/Renal: Elevated Cr, continue to monitor. IV Fluids.   Hematologic: Pending finalization of hypercoagulable workup Antithrombin III Antigen: 22 mg/dL (18 @ 21:52). Dr. Rodriguez following. DVT ppx started tonight.  Infectious Disease: Afebrile, no leukocytosis.   Endocrine: ISS   Disposition: AR    For Follow-Up: Follow up with Dr. Mota upon discharge from AR.    D/w Dr. Jennie Garcias

## 2018-04-06 NOTE — PROGRESS NOTE ADULT - PROBLEM SELECTOR PLAN 1
Pt with stroke code at [facility], where CT head showed left M2 occlusion with R-sided deficits (hemiplegia, R facial droop), CTA showing same L M2 occlusion, now s/p thrombectomy. Heme/onc on board to evaluate coagulopathy.  - c/w Lipitor 80mg  - c/w aspirin 300mg rectal daily  - A1c 5.1, cholesterol 210, HDL 65, LDL 90,   - permissive HTN -160s  - neuro checks q4h for hemorrhagic conversion  - heme/onc Dr. Rodriguez following, f/u recs (+anticardiolipin Ab)  - US lower extremity dopplers neg for DVT  - TTE (4/6): EF 60%, trace TR, MR, DC; no interatrial shunt  - pending BRANDON  - pending S&S eval

## 2018-04-06 NOTE — OCCUPATIONAL THERAPY INITIAL EVALUATION ADULT - ORIENTATION, REHAB EVAL
+expressive aphasia. Consistently communicating with head gestures to yes/no questions. With choices accurately indicates place, month, yea.

## 2018-04-06 NOTE — SWALLOW BEDSIDE ASSESSMENT ADULT - SWALLOW EVAL: RECOMMENDED FEEDING/EATING TECHNIQUES
small sips/bites/maintain upright posture during/after eating for 30 mins/Clear mouth of oral residue ( especially on R lateral sulcus) after the meal/allow for swallow between intakes/oral hygiene

## 2018-04-06 NOTE — DIETITIAN INITIAL EVALUATION ADULT. - NS AS NUTRI INTERV ENTERAL NUTRITION
If PO intake deemed to be unsafe, recommend: Jevity 1.5 Cesar @ 70mL/hr x24hrs (route per MD) to provide 1680mL TV, 2520 kcal, 107g protein, 1277 mL free H2O, 168% RDI, 1.2g/kg ABW protein. Recommend VOLUME BASED FEEDING. Monitor for s/s intolerance/Composition/Schedule/Insert enteral feeding tube/Concentration/Rate/Volume/Route

## 2018-04-06 NOTE — DIETITIAN INITIAL EVALUATION ADULT. - OTHER INFO
51 yo/male with no significant PMHx, admitted s/p being found down by co-worker; found to have L. M2 occlusion, now s/p mechanical thrombectomy and IV tPA administration. Pt seen in room, awake, alert, but notably aphasic. Family members at bedside participated in interview. Pt with regular diet PTA, good appetite and intake. NKFA but strong aversion to tomatoes. No normal complaints of N/V and currently shakes head no to pain or abdominal discomfort. Skin noted intact. No pain endorsed. SLP evaluation ordered for safest PO intake. Discussed this with pt's family and hope to advance to PO intake if safe.

## 2018-04-06 NOTE — PROGRESS NOTE ADULT - ASSESSMENT
52M with   1.  acute cerebrovascular accident, L M2 occlusion, s/p thrombectomy (04/05/2018, Dr. Mota) s/p TPA  2.  hyponatremia  3.  arrhythmia    PLAN:   NEURO: neurochecks q1h, PRN pain meds with Tylenol  stroke:  complete stroke core measures; MRI; post-tPA protocol, repeat CT in 24hours post-tPA; continue high dose statins; will start ASA tomorrow if repeat CT no hemorrhagic conversion; hypercoagulable work-up  REHAB:  physical therapy evaluation and management    EARLY MOB:      PULM:  Room air, incentive spirometry  CARDIO:  SBP goal 120-150mm Hg; echo with BS, needs BRANDON  ENDO:  Blood sugar goals 140-180 mg/dL, continue insulin sliding scale; f/u A12C lipid panel, TSH  GI:  PPI for GI prophylaxis  DIET: NPO for now; dysphagia screen tomorrow  RENAL:  IVF while NPO  HEM/ONC: s/p tPA  VTE Prophylaxis: SCDs only, no DVT chemoprophylaxis for now as patient is high risk for bleed (post-tPA)  ID: afebrile, no leukocytosis  Social: will update family    Patient at high risk for neurological deterioration or death due to:  hemorrhagic conversion, ICU delirium, DVT / PE, aspiration PNA.  Critical care time, excluding procedures: 60 minutes. 52M with   1.  acute cerebrovascular accident, L M2 occlusion, s/p thrombectomy (04/05/2018, Dr. Mota) s/p TPA  2.  hyponatremia  3.  arrhythmia  4.  elevated creatinine etio    PLAN:   NEURO: neurochecks q1h, PRN pain meds with Tylenol  stroke:  complete stroke core measures; MRI; start asa 81 mg, cont high - dose statins; f/u hypercoag work-up  REHAB:  physical therapy evaluation and management    EARLY MOB:  OOB to chair, ambulate with assist    PULM:  Room air, incentive spirometry  CARDIO:  SBP goal 100-150mm Hg; BRANDON; EF normal, no PFOs  ENDO:  Blood sugar goals 140-180 mg/dL, continue insulin sliding scale; A1C 5.2 TSH 0.494 Cholesterol 202 LDL 82  TG 56  GI:  d/c PPI; docusate senna  DIET: NPO for BRANDON; formal dysphagia eval  RENAL:  IVF while NPO; urinalysis  HEM/ONC: Hb stable  VTE Prophylaxis: SCDs, start SQH tonight  ID: afebrile, no leukocytosis  Social: will update family    Patient at high risk for neurological deterioration or death due to:  hemorrhagic conversion, ICU delirium, DVT / PE, aspiration PNA.  Critical care time, excluding procedures: 45 minutes.

## 2018-04-06 NOTE — CONSULT NOTE ADULT - SUBJECTIVE AND OBJECTIVE BOX
Patient is a 52y old  Male who presents with a chief complaint of CVA (05 Apr 2018 15:24)       HPI:  52 year old ambidextrous (right  hand preference) who has been under the care of cardiologist in [facility] (Dr. Ding) for "heart palpitations, not on any medications.  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  Upon arrival patient noted to be aphasic, right facial droop, right hemiplegia, and left gaze preference.  A small hematoma note above his right eye.  CTA left M2 occlusion taken to angio suite for mechanical thrombectomy. (05 Apr 2018 10:00)      PAST MEDICAL & SURGICAL HISTORY:  Cerebrovascular accident (CVA) due to occlusion of left middle cerebral artery: Left M2 occlusion  Irregular heart beat      MEDICATIONS  (STANDING):  atorvastatin 80 milliGRAM(s) Oral at bedtime  dextrose 5%. 1000 milliLiter(s) (50 mL/Hr) IV Continuous <Continuous>  dextrose 50% Injectable 12.5 Gram(s) IV Push once  insulin lispro (HumaLOG) corrective regimen sliding scale   SubCutaneous every 6 hours  pantoprazole  Injectable 40 milliGRAM(s) IV Push every 24 hours  sodium chloride 0.9%. 1000 milliLiter(s) (100 mL/Hr) IV Continuous <Continuous>    MEDICATIONS  (PRN):  acetaminophen   Tablet. 650 milliGRAM(s) Oral every 6 hours PRN Mild Pain (1 - 3)  dextrose Gel 1 Dose(s) Oral once PRN Blood Glucose LESS THAN 70 milliGRAM(s)/deciliter  glucagon  Injectable 1 milliGRAM(s) IntraMuscular once PRN Glucose LESS THAN 70 milligrams/deciliter      Social History: per medical chart, patient lives his wife an 2 daughters in an elevator accessible apartment building, works in airplane maintenance    Functional Level Prior to stroke: fully independent, walked without assistive devices    FAMILY HISTORY:  Family history of cerebrovascular accident (CVA) in father (Father): Diagnosed at age 50s      CBC Full  -  ( 06 Apr 2018 06:53 )  WBC Count : 7.5 K/uL  Hemoglobin : 13.9 g/dL  Hematocrit : 41.2 %  Platelet Count - Automated : 241 K/uL  Mean Cell Volume : 85.1 fL  Mean Cell Hemoglobin : 28.7 pg  Mean Cell Hemoglobin Concentration : 33.7 g/dL  Auto Neutrophil # : x  Auto Lymphocyte # : x  Auto Monocyte # : x  Auto Eosinophil # : x  Auto Basophil # : x  Auto Neutrophil % : x  Auto Lymphocyte % : x  Auto Monocyte % : x  Auto Eosinophil % : x  Auto Basophil % : x      04-06    135  |  101  |  12  ----------------------------<  97  4.2   |  24  |  1.44<H>    Ca    9.0      06 Apr 2018 06:53  Phos  2.5     04-06  Mg     1.8     04-06              Radiology:    < from: CT Brain Stroke Protocol (04.05.18 @ 07:07) >  EXAM:  CT BRAIN STROKE PROTOCOL                          PROCEDURE DATE:  04/05/2018                     INTERPRETATION:  I, Damian Landis MD, have reviewed the images and the   report and agree with the findings, with the following modification:    Left caudate head lacunar infarct of indeterminate age. Left parietal old   infarct.     ******PRELIMINARY REPORT******     Addendum created by Nestor Fiore MD on 4/5/2018 7:18 AM Eastern Time ([place] &   [place])    THIS REPORT CONTAINS FINDINGS THAT MAY BE CRITICAL TO PATIENT CARE. The   findings were verbally communicated via telephone conference with cosmo valero at 7:17 AM EDT on 4/5/2018. The findings were acknowledged and   understood. Initial Report created on 4/5/2018 7:12 AM Eastern Time ([place] &   [place])    EXAM: CT Head Without Intravenous Contrast  CLINICAL HISTORY: The patient is 52 years old and is male; Signs and   symptoms; Other: Stroke code; comments: Stroke  TECHNIQUE: Axial computed tomography images of the head/brain without   intravenous contrast. Coronal and sagittal reformatted images were   created and reviewed.  COMPARISON: No relevant prior studies available.    FINDINGS:  Brain: A focal 8 x 15 x 25 mm subcortical hypodensity of the posterior   left frontal lobe on coronal image 52 and sagittal image 28 is consistent   with a remote infarct. There is a subtle 5 mm chronic lacunar infarct of   the left caudate head abutting the frontal horn on axial image 19. No   evidence of an acute intracranial abnormality. No hemorrhage. No   significant white matter disease.  Ventricles: Unremarkable. No ventriculomegaly.  Bones/joints: Unremarkable. No acute fracture.  Soft tissues: Unremarkable.  Sinuses: Unremarkable as visualized. No acute sinusitis.  Mastoid air cells: Unremarkable as visualized. No mastoid effusion.    IMPRESSION:  1. A focal 8 x 15 x 25 mm subcortical hypodensity of the posterior left   frontal lobe on coronal image 52 and sagittal image 28 is consistent with   a remote infarct.  2. Thereis a subtle 5 mm chronic lacunar infarct of the left caudate   head abutting the frontal horn on axial image 19.  3. No evidence of an acute intracranial abnormality.        < from: CT Angio Head w/ IV Cont (04.05.18 @ 07:07) >  EXAM:  CT ANGIO BRAIN (W)AW IC                          EXAM:  CT ANGIO NECK (W)AW IC                          PROCEDURE DATE:  04/05/2018     60  Optiray 350   0           INTERPRETATION:    I, Damian Landis MD, have reviewed the images and the report and agree   with the findings, with the following modification:     MPR sagittal and coronal MIP images were generated from the axial images.   3-D reconstructions were also obtained and postprocessed on a independent   workstation.    There kymberly abrupt cut off of the left M2 segment involving the superior   division compatible with occlusion.  The right P1 segment is hypoplastic with a prominent posterior   communicating artery supplying the right PCA territory. The anterior   communicating artery is patent.      ******PRELIMINARY REPORT******   EXAM: CT Angiography Head With Intravenous Contrast  CLINICAL HISTORY: The patient is 52 years old and is male; Signs and   symptoms; Other: Stroke code ; comments: Stroke  TECHNIQUE: Axial computed tomographic angiography images of the head with   intravenous contrast using CT angiography protocol. MIP reconstructed   images were created and reviewed. Coronal and sagittal reformatted images   were created and reviewed.  CONTRAST: 60 mL of opt administered intravenously.  COMPARISON: No relevant prior studies available.    FINDINGS:  Right internal carotid artery: No acute findings. Intracranial segment is   patent with no significant stenosis. No aneurysm.  Right anterior cerebral artery: Unremarkable. No occlusion or significant   stenosis. No aneurysm.  Right middle cerebral artery: Unremarkable. No occlusion or significant   stenosis. No aneurysm.  Right posterior cerebral artery: There are prominent posterior   communicating arteries larger on the right. No occlusion or significant   stenosis. No aneurysm.  Right vertebral artery: Unremarkable as visualized.  Left internal carotid artery: No acute findings. Intracranial segment is   patent with no significant stenosis. No aneurysm.  Left anterior cerebral artery: Unremarkable. No occlusion or significant   stenosis. No aneurysm.  Left middle cerebral artery: The left anterior M2 division demonstrates   an abrupt cutoff 7 mm beyond the trifurcation on sagittal image 19 of   series 9 and coronal image 39 of series 8. There is subtle irregularity   of the distal angular branch on sagittal image 20 of series 9. This   appears to be confirmed on coronal image 51. No occlusion or significant   stenosis. No aneurysm.  Leftposterior cerebral artery: Unremarkable. No occlusion or significant   stenosis. No aneurysm.  Left vertebral artery: Unremarkable as visualized.  Basilar artery: Unremarkable. No occlusion or significant stenosis. No   aneurysm.  Brain: A chronic left posterior frontal subcortical infarct is better   seen on separately reported head CT. A left caudate head lacunar infarct   is not well seen on this exam.    IMPRESSION:  1. A chronic left posterior frontal subcortical infarct is better seen on   separately reported head CT. A left caudate head lacunar infarct is not   well seen on this exam.  2. The left anterior M2 division demonstrates an abrupt cutoff 7 mm   beyond the trifurcation on sagittal image 19 of series 9 and coronal   image 39 ofseries 8. There is subtle irregularity of the distal angular   branch on sagittal image 20 of series 9. This appears to be confirmed on   coronal image 51.  _______________________________________________  EXAM: CT Angiography Neck With Intravenous Contrast  CLINICAL HISTORY: The patient is 52 years old and is male; Signs and   symptoms; Other: Stroke code ; comments: Stroke  TECHNIQUE: Axial computed tomographic angiography images of the neck with   intravenous contrast using CT angiography protocol. MIP reconstructed   images were created and reviewed.  Coronal and sagittal reformatted images were created and reviewed.  CONTRAST: 60 mL of opt administered intravenously.  COMPARISON: No relevant prior studies available.    FINDINGS:  Limitations: Breathing is noted on these images limiting the   interpretation.  VASCULATURE:  Right common carotid artery: Unremarkable. No significant stenosis. No   dissection or occlusion.  Right internal carotid artery: Unremarkable. Extracranial segmentis   patent with no significant stenosis. No dissection or occlusion.  Right external carotid artery: Unremarkable. No occlusion.  Right vertebral artery: Unremarkable. No significant stenosis. No   dissection or occlusion.  Left common carotid artery: Unremarkable. No significant stenosis. No   dissection or occlusion.  Left internal carotid artery: There is no proximal left carotid disease   to explain the cutoff in the left anterior M2 division intracranially   suggesting a possible central embolic origin.  Left external carotid artery: Unremarkable. No occlusion.  Left vertebral artery: Unremarkable. No significant stenosis. No   dissection or occlusion.    NECK:  Bones/joints: The spine demonstrates moderate degenerative changes at   multiple levels. No acute fracture. No dislocation.  Soft tissues: Unremarkable as visualized. No mass.  Lymph nodes: There are numerous prominent but non-pathologic lymph nodes   in the neck. There are no nodes of pathologic dimensions.  Thyroid: A 7 mmhypodensity of the central right thyroid does not warrant   further followup.  Lung apices: The visualized portions of the lung apices are normal.    CAROTID STENOSIS REFERENCE USING NASCET CRITERIA:  % ICA stenosis = (1 - narrowest ICA diameter/diameter of distal cervical   ICA) x 100.  Mild - <50% stenosis.  Moderate - 50-69% stenosis.  Severe - 70-94% stenosis.  Near occlusion - 95-99% stenosis.  Occluded - 100% stenosis.    IMPRESSION:  1. A 7 mm hypodensity of the central right thyroid does not warrant   further followup.  2. There is no proximal left carotid disease to explain the cutoff in the   left anterior M2 division intracranially suggesting a possible central   embolic origin.        < from: CT Head No Cont (04.06.18 @ 05:44) >  EXAM:  CT BRAIN                          PROCEDURE DATE:  04/06/2018                     INTERPRETATION:  IDamian MD, have reviewed the images and the   report and agree with the findings, with the following modification:     Comparison is made with the patient's prior CT brain 4/5/2018 at 6:49 AM.    There is a large wedge-shaped area of hypodensity with loss of gray-white   differentiation involving the left frontal lobe extending into left   insular region compatible with evolvingacute/subacute infarct along the   left MCA territory. There also is a new 5 mm focal area of hypodensity   within the left globe is pallidus (series 2 image 17. There is  stable   lacunar infarct involving the left caudate head (series 2 image 18) and   left parietal lobe (series series 2 images 22-25 and series 5 image 28).   There is no intracranial hemorrhage.    ******PRELIMINARY REPORT******     EXAM: CT Head Without Intravenous Contrast  EXAM DATE/TIME: Exam ordered 4/6/2018 5:37 AM  CLINICAL HISTORY: 52 years old, male; Signs and symptoms; Other: CVA ;   comments: Left m2 occlusion, S/P iv tpa and mechanical thrombectomy  TECHNIQUE: Axial computed tomography images of the head/brain without   intravenous contrast.  Coronal and sagittal reformatted images were created and reviewed.  COMPARISON: Vascular^CTA_HEAD_NECK (Adult) 2018-04-05 06:50    FINDINGS:  Brain: There is an approximately 5 cm area of left frontotemporal   cytotoxic edema compatible with acute/subacute ischemia/infarction. No   intracranial hemorrhage.  Ventricles: Unremarkable. No ventriculomegaly.  Bones/joints: Fracture deformity of the medial wall of the left orbit   appears chronic.  Soft tissues: Unremarkable.  Sinuses: Unremarkable as visualized. No acutesinusitis.  Mastoid air cells: Unremarkable as visualized. No mastoid effusion.    IMPRESSION: There is an approximately 5 cm area of left frontotemporal   cytotoxic edema compatible with  acute/subacute ischemia/infarction. No intracranial hemorrhage.            Vital Signs Last 24 Hrs  T(C): 36.6 (06 Apr 2018 06:00), Max: 36.8 (05 Apr 2018 14:26)  T(F): 97.9 (06 Apr 2018 06:00), Max: 98.3 (05 Apr 2018 21:39)  HR: 58 (06 Apr 2018 08:00) (58 - 84)  BP: 104/57 (05 Apr 2018 11:30) (104/57 - 130/65)  BP(mean): 76 (05 Apr 2018 11:30) (74 - 88)  RR: 14 (06 Apr 2018 02:00) (10 - 26)  SpO2: 97% (06 Apr 2018 08:00) (97% - 100%)    REVIEW OF SYSTEMS: patient is globally aphasic    CONSTITUTIONAL: No fever, weight loss, or fatigue  EYES: No eye pain, visual disturbances, or discharge  ENMT:  No difficulty hearing, tinnitus, vertigo; No sinus or throat pain  NECK: No pain or stiffness  BREASTS: No pain, masses, or nipple discharge  RESPIRATORY: No cough, wheezing, chills or hemoptysis; No shortness of breath  CARDIOVASCULAR: No chest pain, palpitations, dizziness, or leg swelling  GASTROINTESTINAL: No abdominal or epigastric pain. No nausea, vomiting, or hematemesis; No diarrhea or constipation. No melena or hematochezia.  GENITOURINARY: No dysuria, frequency, hematuria, or incontinence  NEUROLOGICAL: No headaches, memory loss, loss of strength, numbness, or tremors  SKIN: No itching, burning, rashes, or lesions   LYMPH NODES: No enlarged glands  ENDOCRINE: No heat or cold intolerance; No hair loss  MUSCULOSKELETAL: No joint pain or swelling; No muscle, back, or extremity pain  PSYCHIATRIC: No depression, anxiety, mood swings, or difficulty sleeping  HEME/LYMPH: No easy bruising, or bleeding gums  ALLERGY AND IMMUNOLOGIC: No hives or eczema  VASCULAR: no swelling, erythema    Physical Exam: AA gentleman lying in bed, awake, alert, globally aphasic    HEENT: normocephalic,  anicteric,  atraumatic    Neck: supple, negative JVD, negative carotid bruits,    Chest: CTA bilaterally, neg wheeze, rhonchi, rales, crackles, egophany    Cardiovascular: borderline bradycardic, neg murmurs/rubs/gallops    Abdomen: soft, non distended, non tender, negative rebound/guarding, normal bowel sounds, neg hepatosplenomegaly    Extremities: WWP, neg cyanosis/clubbing/edema, negative calf tenderness to palpation, negative Lauri's sign    :     Neurologic Exam:    awake and alert, globally aphasic, follows intermittent simple commands    Cranial Nerves:     II:                       pupils equal, round and reactive to light, ? right field cut  III/ IV/VI:             left gaze preference  V:                      facial sensation intact, V1-3 normal  VII:                     right droop, normal eye closure  VIII:                    unable to assess  IX/ X:                 did not follow command  XI:                      did not follow command  XII:                    did not follow command    Motor Exam:    Bilateral UE:     Right:    plegic                           Left:      > 3+/5     Bilateral LE:      Right      plegic                           Left:       > 3+/5    Sensory:              intact to LT/PP in all UE/LE dermatomes    DTR:                   = biceps/     triceps/     brachioradialis                            = patella/   medial hamstring/    ankle                            neg clonus                            neg Babinski                            neg Hoffmans      Romberg:  not tested    Tandem Walking:  not tested    Gait:  not tested        PM&R Impression:    1) s/p acute left fronto temporal infarct  2) Right hemiplegia, global aphasia  3) s/p thrombectomy 4/5/2018      Recommendations:    1) Physical therapy focusing on therapeutic exercises, bed mobility/transfer out of bed evaluation, progressive ambulation with assistive devices.    2) Disposition Plan/Recommendations: anticipate acute rehab placement

## 2018-04-06 NOTE — PHYSICAL THERAPY INITIAL EVALUATION ADULT - ADDITIONAL COMMENTS
Pt lives with family in an elevator building. Prior to admission, pt ambulated independently without assistive device.

## 2018-04-06 NOTE — SWALLOW BEDSIDE ASSESSMENT ADULT - ASR SWALLOW LINGUAL MOBILITY
Mild lingual deviation to R on protrusion.  Pt also with some evidence of oral apraxia when ased to perform various lingua movements

## 2018-04-06 NOTE — PROGRESS NOTE ADULT - PROBLEM SELECTOR PLAN 5
VTE ppx: hep 5000 q8h  GI ppx: yasmin Mayorga  ISS    Code status: FULL  Dispo: 7La, acute rehab on discharge

## 2018-04-06 NOTE — PROGRESS NOTE ADULT - SUBJECTIVE AND OBJECTIVE BOX
Neuro-Endovascular Surgery    Awake, alert  Expressive aphasia  Right arm plegia  Right facial weakness    CT no hemorrhage, left opercular ischemic stroke

## 2018-04-06 NOTE — SWALLOW BEDSIDE ASSESSMENT ADULT - SLP GENERAL OBSERVATIONS
Pt received awake & alert, pleasant.  Pt followed simple commands & responded correctly to Y/N questions, (+) increased word-finding difficulties, able to repeat name, wife's name, and basic greetings.   No kristi dysarthria noted.

## 2018-04-06 NOTE — DIETITIAN INITIAL EVALUATION ADULT. - PROBLEM SELECTOR PLAN 1
S/P IV TPA and cerebral angiogram with successful mechanical thrombectomy.  -150  Neuro checks along with vitals, puncture site and pulses checks as per orders.  NPO for now  No anticoagulants or anti thrombotics x 24 hrs  Repeat head CT 24 hrs post IV TPA or if increased swelling right periorbital region or change in MS.  LE Dopplers urgent r/o DVT

## 2018-04-06 NOTE — PHYSICAL THERAPY INITIAL EVALUATION ADULT - PERTINENT HX OF CURRENT PROBLEM, REHAB EVAL
Pt is a 51 yo male found by co worker down and foaming at the mouth, taken to [facility] with stroke symptoms, CT scan showed left M2 occlusion, no large territory infarct, patient received IV TPA and was transferred to [facility] for further care, s/p cath angio and thrombectomy on 4/5/18

## 2018-04-06 NOTE — OCCUPATIONAL THERAPY INITIAL EVALUATION ADULT - RANGE OF MOTION EXAMINATION, UPPER EXTREMITY
Left UE Active ROM was WFL (within functional limits)/Right UE Passive ROM was WNL (within normal limits)

## 2018-04-06 NOTE — DIETITIAN INITIAL EVALUATION ADULT. - ENERGY NEEDS
Height 71"; .5#; #; 118%IBW  BMI 28.4  ABW used for calculations as pt between % of IBW. Needs estimated for maintenance in adults; adjusted protein needs for s/p CVA

## 2018-04-07 ENCOUNTER — TRANSCRIPTION ENCOUNTER (OUTPATIENT)
Age: 53
End: 2018-04-07

## 2018-04-07 LAB
ANION GAP SERPL CALC-SCNC: 14 MMOL/L — SIGNIFICANT CHANGE UP (ref 5–17)
BUN SERPL-MCNC: 12 MG/DL — SIGNIFICANT CHANGE UP (ref 7–23)
CALCIUM SERPL-MCNC: 9.4 MG/DL — SIGNIFICANT CHANGE UP (ref 8.4–10.5)
CHLORIDE SERPL-SCNC: 98 MMOL/L — SIGNIFICANT CHANGE UP (ref 96–108)
CO2 SERPL-SCNC: 23 MMOL/L — SIGNIFICANT CHANGE UP (ref 22–31)
CREAT SERPL-MCNC: 1.46 MG/DL — HIGH (ref 0.5–1.3)
GLUCOSE SERPL-MCNC: 108 MG/DL — HIGH (ref 70–99)
HCT VFR BLD CALC: 43.4 % — SIGNIFICANT CHANGE UP (ref 39–50)
HGB BLD-MCNC: 14.5 G/DL — SIGNIFICANT CHANGE UP (ref 13–17)
MAGNESIUM SERPL-MCNC: 2 MG/DL — SIGNIFICANT CHANGE UP (ref 1.6–2.6)
MCHC RBC-ENTMCNC: 28.7 PG — SIGNIFICANT CHANGE UP (ref 27–34)
MCHC RBC-ENTMCNC: 33.4 G/DL — SIGNIFICANT CHANGE UP (ref 32–36)
MCV RBC AUTO: 85.8 FL — SIGNIFICANT CHANGE UP (ref 80–100)
PHOSPHATE SERPL-MCNC: 2.7 MG/DL — SIGNIFICANT CHANGE UP (ref 2.5–4.5)
PLATELET # BLD AUTO: 242 K/UL — SIGNIFICANT CHANGE UP (ref 150–400)
POTASSIUM SERPL-MCNC: 4 MMOL/L — SIGNIFICANT CHANGE UP (ref 3.5–5.3)
POTASSIUM SERPL-SCNC: 4 MMOL/L — SIGNIFICANT CHANGE UP (ref 3.5–5.3)
RBC # BLD: 5.06 M/UL — SIGNIFICANT CHANGE UP (ref 4.2–5.8)
RBC # FLD: 13.7 % — SIGNIFICANT CHANGE UP (ref 10.3–16.9)
SODIUM SERPL-SCNC: 135 MMOL/L — SIGNIFICANT CHANGE UP (ref 135–145)
WBC # BLD: 7.7 K/UL — SIGNIFICANT CHANGE UP (ref 3.8–10.5)
WBC # FLD AUTO: 7.7 K/UL — SIGNIFICANT CHANGE UP (ref 3.8–10.5)

## 2018-04-07 PROCEDURE — 99233 SBSQ HOSP IP/OBS HIGH 50: CPT

## 2018-04-07 RX ORDER — INSULIN LISPRO 100/ML
VIAL (ML) SUBCUTANEOUS
Qty: 0 | Refills: 0 | Status: DISCONTINUED | OUTPATIENT
Start: 2018-04-07 | End: 2018-04-09

## 2018-04-07 RX ADMIN — SENNA PLUS 2 TABLET(S): 8.6 TABLET ORAL at 22:53

## 2018-04-07 RX ADMIN — ATORVASTATIN CALCIUM 80 MILLIGRAM(S): 80 TABLET, FILM COATED ORAL at 22:53

## 2018-04-07 RX ADMIN — HEPARIN SODIUM 5000 UNIT(S): 5000 INJECTION INTRAVENOUS; SUBCUTANEOUS at 14:27

## 2018-04-07 RX ADMIN — Medication 100 MILLIGRAM(S): at 22:53

## 2018-04-07 RX ADMIN — Medication 81 MILLIGRAM(S): at 11:44

## 2018-04-07 RX ADMIN — Medication 100 MILLIGRAM(S): at 14:27

## 2018-04-07 RX ADMIN — Medication 100 MILLIGRAM(S): at 07:07

## 2018-04-07 RX ADMIN — HEPARIN SODIUM 5000 UNIT(S): 5000 INJECTION INTRAVENOUS; SUBCUTANEOUS at 22:52

## 2018-04-07 RX ADMIN — HEPARIN SODIUM 5000 UNIT(S): 5000 INJECTION INTRAVENOUS; SUBCUTANEOUS at 07:06

## 2018-04-07 NOTE — DISCHARGE NOTE ADULT - CARE PLAN
Assessment and plan of treatment:	Once you are at home call the office to make a follow up appointment with Dr Mota  [phone] Principal Discharge DX:	Cerebrovascular accident (CVA) due to occlusion of left middle cerebral artery  Goal:	Improve symptoms of weakness. Discharge to acute rehabilitation.  Assessment and plan of treatment:	You were found to have a left side (MCA) stroke with a thrombectomy. You underwent work up for your stroke for which you were found to have +anticardiolipin antibody and MTHFR You still have weakness in your right upper extremity for which you were recommended for acute rehabilitation for further management. Dr Mota  [phone]  Secondary Diagnosis:	Atrial mass Principal Discharge DX:	Cerebrovascular accident (CVA) due to occlusion of left middle cerebral artery  Goal:	Improve symptoms of weakness. Discharge to acute rehabilitation.  Assessment and plan of treatment:	You were found to have a left side (MCA) stroke with a thrombectomy. You underwent work up for your stroke for which you were found to have +anticardiolipin antibody and MTHFR that puts you at an increased risk of clotting. You were also found to have a left atrial clot in the heart for which you have been on anticoagulation (blood thinner) with serial transesophageal echocardiograms, it has been determined that you are stable on anticoagulation without other intervention. You still have weakness in your right upper extremity for which you were recommended for acute rehabilitation for further management. You will need to follow up with Dr. Mcfadden, Dr Mota, and Dr. Rodriguez upon discharge.  Secondary Diagnosis:	Atrial mass  Assessment and plan of treatment:	You were found to have a clot in your heart likely secondary to your clotting disorder. You were on IV heparin and closely monitored in the telemetry unit. You had serial transesophageal echocardiograms that showed stabilization of the clot that will warrant further anticoagulation (blood thinners). You will be bridged to coumadin with lovenox injections upon discharge. You require no further interventions at this time. Cardiology clearance. You will be discharged to acute rehabilitation and to follow up with Dr. Mcfadden, Dr. Mota and Dr. Rodriguez upon discharge from acute rehabilitation.

## 2018-04-07 NOTE — PROGRESS NOTE ADULT - ASSESSMENT
51yo M with no PMH presenting with acute left M2 occlusion from [facility] (s/p tPA), now s/p thrombectomy, pending BRANDON and S&S eval.

## 2018-04-07 NOTE — PROGRESS NOTE ADULT - SUBJECTIVE AND OBJECTIVE BOX
CC: Patient is a 52y old  Male who presents with a chief complaint of CVA (05 Apr 2018 15:24)    OVERNIGHT EVENTS:    SUBJECTIVE / INTERVAL HPI: Patient seen and examined at bedside.     VITAL SIGNS:  Vital Signs Last 24 Hrs  T(C): 37.3 (07 Apr 2018 05:00), Max: 37.9 (07 Apr 2018 01:00)  T(F): 99.2 (07 Apr 2018 05:00), Max: 100.3 (07 Apr 2018 01:00)  HR: 62 (07 Apr 2018 04:18) (58 - 78)  BP: 130/86 (07 Apr 2018 04:18) (130/86 - 157/87)  BP(mean): 103 (07 Apr 2018 04:18) (103 - 103)  RR: 20 (07 Apr 2018 04:18) (14 - 21)  SpO2: 95% (07 Apr 2018 04:18) (95% - 100%)    PHYSICAL EXAM:    General: WDWN  HEENT: NC/AT; PERRL, anicteric sclera; MMM  Neck: supple  Cardiovascular: +S1/S2; RRR  Respiratory: CTA B/L; no W/R/R  Gastrointestinal: soft, NT/ND; +BSx4  Extremities: WWP; no edema, clubbing or cyanosis  Vascular: 2+ radial, DP/PT pulses B/L  Neurological: alert awake oriented; no focal deficits    MEDICATIONS:  MEDICATIONS  (STANDING):  aspirin enteric coated 81 milliGRAM(s) Oral daily  atorvastatin 80 milliGRAM(s) Oral at bedtime  dextrose 5%. 1000 milliLiter(s) (50 mL/Hr) IV Continuous <Continuous>  dextrose 50% Injectable 12.5 Gram(s) IV Push once  docusate sodium 100 milliGRAM(s) Oral three times a day  heparin  Injectable 5000 Unit(s) SubCutaneous every 8 hours  insulin lispro (HumaLOG) corrective regimen sliding scale   SubCutaneous every 6 hours  senna 2 Tablet(s) Oral at bedtime    MEDICATIONS  (PRN):  acetaminophen   Tablet 650 milliGRAM(s) Oral every 6 hours PRN For Temp greater than 38 C (100.4 F)  acetaminophen   Tablet. 650 milliGRAM(s) Oral every 6 hours PRN Mild Pain (1 - 3)  dextrose Gel 1 Dose(s) Oral once PRN Blood Glucose LESS THAN 70 milliGRAM(s)/deciliter  glucagon  Injectable 1 milliGRAM(s) IntraMuscular once PRN Glucose LESS THAN 70 milligrams/deciliter  ondansetron Injectable 4 milliGRAM(s) IV Push every 6 hours PRN Nausea and/or Vomiting      ALLERGIES:  Allergies    Allergy Status Unknown    Intolerances        LABS:                        14.5   7.7   )-----------( 242      ( 07 Apr 2018 06:31 )             43.4     04-07    135  |  98  |  12  ----------------------------<  108<H>  4.0   |  23  |  1.46<H>    Ca    9.4      07 Apr 2018 06:31  Phos  2.7     04-07  Mg     2.0     04-07      PT/INR - ( 05 Apr 2018 12:54 )   PT: 12.6 sec;   INR: 1.13          PTT - ( 05 Apr 2018 12:54 )  PTT:33.4 sec    CAPILLARY BLOOD GLUCOSE      POCT Blood Glucose.: 95 mg/dL (07 Apr 2018 06:51)      RADIOLOGY & ADDITIONAL TESTS: Reviewed. CC: Patient is a 52y old  Male who presents with a chief complaint of CVA (05 Apr 2018 15:24)    OVERNIGHT EVENTS: NAEO    SUBJECTIVE / INTERVAL HPI: Patient seen and examined at bedside.     VITAL SIGNS:  Vital Signs Last 24 Hrs  T(C): 37.3 (07 Apr 2018 05:00), Max: 37.9 (07 Apr 2018 01:00)  T(F): 99.2 (07 Apr 2018 05:00), Max: 100.3 (07 Apr 2018 01:00)  HR: 62 (07 Apr 2018 04:18) (58 - 78)  BP: 130/86 (07 Apr 2018 04:18) (130/86 - 157/87)  BP(mean): 103 (07 Apr 2018 04:18) (103 - 103)  RR: 20 (07 Apr 2018 04:18) (14 - 21)  SpO2: 95% (07 Apr 2018 04:18) (95% - 100%)    PHYSICAL EXAM:    General: sitting in chair, resting comfortably, in NAD  HEENT: EOMI, anicteric, R facial droop, able to move bilateral eyebrows R>L  Neck: supple, no LAD  Cardiovascular: +S1/S2; RRR  Respiratory: CTA B/L; no W/R/R  Gastrointestinal: soft, NT/ND; +BSx4  Extremities: WWP; no edema, clubbing or cyanosis  Vascular: 2+ radial, DP/PT pulses B/L  Neurological: AOx3; improved speech, R facial droop, able to move bilateral eyebrows R>L, 5/5 strength LUE and LLE, 0/5 RUE unable to lift right arm, 4/5 RLE strength    MEDICATIONS:  MEDICATIONS  (STANDING):  aspirin enteric coated 81 milliGRAM(s) Oral daily  atorvastatin 80 milliGRAM(s) Oral at bedtime  dextrose 5%. 1000 milliLiter(s) (50 mL/Hr) IV Continuous <Continuous>  dextrose 50% Injectable 12.5 Gram(s) IV Push once  docusate sodium 100 milliGRAM(s) Oral three times a day  heparin  Injectable 5000 Unit(s) SubCutaneous every 8 hours  insulin lispro (HumaLOG) corrective regimen sliding scale   SubCutaneous every 6 hours  senna 2 Tablet(s) Oral at bedtime    MEDICATIONS  (PRN):  acetaminophen   Tablet 650 milliGRAM(s) Oral every 6 hours PRN For Temp greater than 38 C (100.4 F)  acetaminophen   Tablet. 650 milliGRAM(s) Oral every 6 hours PRN Mild Pain (1 - 3)  dextrose Gel 1 Dose(s) Oral once PRN Blood Glucose LESS THAN 70 milliGRAM(s)/deciliter  glucagon  Injectable 1 milliGRAM(s) IntraMuscular once PRN Glucose LESS THAN 70 milligrams/deciliter  ondansetron Injectable 4 milliGRAM(s) IV Push every 6 hours PRN Nausea and/or Vomiting      ALLERGIES:  Allergies    Allergy Status Unknown    Intolerances        LABS:                        14.5   7.7   )-----------( 242      ( 07 Apr 2018 06:31 )             43.4     04-07    135  |  98  |  12  ----------------------------<  108<H>  4.0   |  23  |  1.46<H>    Ca    9.4      07 Apr 2018 06:31  Phos  2.7     04-07  Mg     2.0     04-07      PT/INR - ( 05 Apr 2018 12:54 )   PT: 12.6 sec;   INR: 1.13          PTT - ( 05 Apr 2018 12:54 )  PTT:33.4 sec    CAPILLARY BLOOD GLUCOSE      POCT Blood Glucose.: 95 mg/dL (07 Apr 2018 06:51)      RADIOLOGY & ADDITIONAL TESTS: Reviewed.

## 2018-04-07 NOTE — DISCHARGE NOTE ADULT - PATIENT PORTAL LINK FT
You can access the [facility] Patient Portal, offered by [facility], by registering with the following website: [email or web address]

## 2018-04-07 NOTE — DISCHARGE NOTE ADULT - MEDICATION SUMMARY - MEDICATIONS TO TAKE
I will START or STAY ON the medications listed below when I get home from the hospital:    aspirin 81 mg oral delayed release tablet  -- 1 tab(s) by mouth once a day  -- Indication: For Cerebrovascular accident (CVA) due to occlusion of left middle cerebral artery    enoxaparin  -- 90 milligram(s) injectable 2 times a day  -- Indication: For Left atrial thrombus    warfarin 7.5 mg oral tablet  -- 1 tab(s) by mouth once a day  -- Indication: For Left atrial thrombus    escitalopram 5 mg oral tablet  -- 1 tab(s) by mouth once a day  -- Indication: For Cerebrovascular accident (CVA) due to occlusion of left middle cerebral artery    atorvastatin 80 mg oral tablet  -- 1 tab(s) by mouth once a day (at bedtime)  -- Indication: For Cerebrovascular accident (CVA) due to occlusion of left middle cerebral artery    donepezil 5 mg oral tablet  -- 1 tab(s) by mouth once a day (at bedtime)  -- Indication: For Cerebrovascular accident (CVA) due to occlusion of left middle cerebral artery    Multiple Vitamins oral capsule  -- 1 cap(s) by mouth once a day  -- Indication: For Prophylactic measure

## 2018-04-07 NOTE — DISCHARGE NOTE ADULT - CARE PROVIDER_API CALL
Richard Mota), Neurology; Vascular Neurology  [address] [address]  Phone: [phone]  Fax: [phone]    Sarai Mcfadden), Neurology; Vascular Neurology  [address] [address]  Phone: [phone]  Fax: [phone] Richard Mota), Neurology; Vascular Neurology  [address] [address]  Phone: [phone]  Fax: [phone]    Sarai Mcfadden), Neurology; Vascular Neurology  [address] [address]  Phone: [phone]  Fax: [phone]    Corazon Rodriguez), Internal Medicine  [address]  [address] [place] [address]  Phone: [phone]  Fax: [phone]

## 2018-04-07 NOTE — PROGRESS NOTE ADULT - SUBJECTIVE AND OBJECTIVE BOX
HPI:  52 year old ambidextrous (right  hand preference) who has been under the care of cardiologist in [facility] (Dr. Ding) for "heart palpitations, not on any medications.  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  Upon arrival patient noted to be aphasic, right facial droop, right hemiplegia, and left gaze preference.  A small hematoma note above his right eye.  CTA left M2 occlusion taken to angio suite for mechanical thrombectomy. (05 Apr 2018 10:00)    OVERNIGHT EVENTS:  No acute overnight events.     Vital Signs Last 24 Hrs  T(C): 36.6 (07 Apr 2018 14:48), Max: 37.9 (07 Apr 2018 01:00)  T(F): 97.9 (07 Apr 2018 14:48), Max: 100.3 (07 Apr 2018 01:00)  HR: 60 (07 Apr 2018 15:30) (60 - 72)  BP: 145/93 (07 Apr 2018 15:30) (118/75 - 152/88)  BP(mean): 114 (07 Apr 2018 15:30) (93 - 115)  RR: 20 (07 Apr 2018 15:30) (16 - 20)  SpO2: 95% (07 Apr 2018 15:30) (95% - 99%)    I&O's Summary    06 Apr 2018 07:01  -  07 Apr 2018 07:00  --------------------------------------------------------  IN: 300 mL / OUT: 250 mL / NET: 50 mL    07 Apr 2018 07:01  -  07 Apr 2018 16:42  --------------------------------------------------------  IN: 0 mL / OUT: 1800 mL / NET: -1800 mL        PHYSICAL EXAM:  Neurological:  AA, oriented to self, place, unable to specify date d/t aphasia, but with options, expressive aphasia, small phrases  LUE plegia 0/5, diminished sensation in LUE in 3 derm, 4/5 in LLE,  O/w 5/5 on RUE/RLE         [X] warm well perfused; capillary refill <3 seconds   Sensation: [] intact to light touch  [X] decreased: LUE  Cardiovascular: NSR, S1S2  Respiratory: LCTAB, no wheezing rhonchi, rales  Gastrointestinal: BS+ NTD    TUBES/LINES:  [] Vines  [] Lumbar Drain  [] Wound Drains  [X] Others: PIVs      DIET:  [] NPO  [X] Mechanical  [] Tube feeds    LABS:                        14.5   7.7   )-----------( 242      ( 07 Apr 2018 06:31 )             43.4     04-07    135  |  98  |  12  ----------------------------<  108<H>  4.0   |  23  |  1.46<H>    Ca    9.4      07 Apr 2018 06:31  Phos  2.7     04-07  Mg     2.0     04-07              CAPILLARY BLOOD GLUCOSE      POCT Blood Glucose.: 92 mg/dL (07 Apr 2018 16:29)  POCT Blood Glucose.: 129 mg/dL (07 Apr 2018 11:30)  POCT Blood Glucose.: 95 mg/dL (07 Apr 2018 06:51)  POCT Blood Glucose.: 88 mg/dL (07 Apr 2018 00:12)  POCT Blood Glucose.: 100 mg/dL (06 Apr 2018 21:18)  POCT Blood Glucose.: 100 mg/dL (06 Apr 2018 17:42)      Drug Levels: [] N/A    CSF Analysis: [] N/A      Allergies    Allergy Status Unknown    Intolerances      MEDICATIONS:  Antibiotics:    Neuro:  acetaminophen   Tablet 650 milliGRAM(s) Oral every 6 hours PRN  acetaminophen   Tablet. 650 milliGRAM(s) Oral every 6 hours PRN  ondansetron Injectable 4 milliGRAM(s) IV Push every 6 hours PRN    Anticoagulation:  aspirin enteric coated 81 milliGRAM(s) Oral daily  heparin  Injectable 5000 Unit(s) SubCutaneous every 8 hours    OTHER:  atorvastatin 80 milliGRAM(s) Oral at bedtime  dextrose 50% Injectable 12.5 Gram(s) IV Push once  dextrose Gel 1 Dose(s) Oral once PRN  docusate sodium 100 milliGRAM(s) Oral three times a day  glucagon  Injectable 1 milliGRAM(s) IntraMuscular once PRN  insulin lispro (HumaLOG) corrective regimen sliding scale   SubCutaneous every 6 hours  senna 2 Tablet(s) Oral at bedtime    IVF:  dextrose 5%. 1000 milliLiter(s) IV Continuous <Continuous>    CULTURES:    RADIOLOGY & ADDITIONAL TESTS:      ASSESSMENT:  52y Male w/ acute cerebrovascular accident, L M2 occlusion, s/p thrombectomy (04/05/2018, Dr. Mota) s/p TPA    - Agree with Neurology plan, appreciated Heme recs  - Will sign off at this point  - Follow up with Dr. Mota upon discharge from AR  DISPOSITION: AR HPI:  52 year old ambidextrous (right  hand preference) who has been under the care of cardiologist in [facility] (Dr. Ding) for "heart palpitations, not on any medications.  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 for further care.  Upon arrival patient noted to be aphasic, right facial droop, right hemiplegia, and left gaze preference.  A small hematoma note above his right eye.  CTA left M2 occlusion taken to angio suite for mechanical thrombectomy. (05 Apr 2018 10:00)    OVERNIGHT EVENTS:  No acute overnight events.     Vital Signs Last 24 Hrs  T(C): 36.6 (07 Apr 2018 14:48), Max: 37.9 (07 Apr 2018 01:00)  T(F): 97.9 (07 Apr 2018 14:48), Max: 100.3 (07 Apr 2018 01:00)  HR: 60 (07 Apr 2018 15:30) (60 - 72)  BP: 145/93 (07 Apr 2018 15:30) (118/75 - 152/88)  BP(mean): 114 (07 Apr 2018 15:30) (93 - 115)  RR: 20 (07 Apr 2018 15:30) (16 - 20)  SpO2: 95% (07 Apr 2018 15:30) (95% - 99%)    I&O's Summary    06 Apr 2018 07:01  -  07 Apr 2018 07:00  --------------------------------------------------------  IN: 300 mL / OUT: 250 mL / NET: 50 mL    07 Apr 2018 07:01  -  07 Apr 2018 16:42  --------------------------------------------------------  IN: 0 mL / OUT: 1800 mL / NET: -1800 mL        PHYSICAL EXAM:  Neurological:  AA, oriented to self, place, unable to specify date d/t aphasia, but with options, expressive aphasia, small phrases  R facial droop   RUE plegia 0/5, diminished sensation in RUE in 3 derm, 4/5 in RLE,  O/w 5/5 on LUE/LLE         [X] warm well perfused; capillary refill <3 seconds   Sensation: [] intact to light touch  [X] decreased: LUE  Cardiovascular: NSR, S1S2  Respiratory: LCTAB, no wheezing rhonchi, rales  Gastrointestinal: BS+ NTD    TUBES/LINES:  [] Vines  [] Lumbar Drain  [] Wound Drains  [X] Others: PIVs      DIET:  [] NPO  [X] Mechanical  [] Tube feeds    LABS:                        14.5   7.7   )-----------( 242      ( 07 Apr 2018 06:31 )             43.4     04-07    135  |  98  |  12  ----------------------------<  108<H>  4.0   |  23  |  1.46<H>    Ca    9.4      07 Apr 2018 06:31  Phos  2.7     04-07  Mg     2.0     04-07              CAPILLARY BLOOD GLUCOSE      POCT Blood Glucose.: 92 mg/dL (07 Apr 2018 16:29)  POCT Blood Glucose.: 129 mg/dL (07 Apr 2018 11:30)  POCT Blood Glucose.: 95 mg/dL (07 Apr 2018 06:51)  POCT Blood Glucose.: 88 mg/dL (07 Apr 2018 00:12)  POCT Blood Glucose.: 100 mg/dL (06 Apr 2018 21:18)  POCT Blood Glucose.: 100 mg/dL (06 Apr 2018 17:42)      Drug Levels: [] N/A    CSF Analysis: [] N/A      Allergies    Allergy Status Unknown    Intolerances      MEDICATIONS:  Antibiotics:    Neuro:  acetaminophen   Tablet 650 milliGRAM(s) Oral every 6 hours PRN  acetaminophen   Tablet. 650 milliGRAM(s) Oral every 6 hours PRN  ondansetron Injectable 4 milliGRAM(s) IV Push every 6 hours PRN    Anticoagulation:  aspirin enteric coated 81 milliGRAM(s) Oral daily  heparin  Injectable 5000 Unit(s) SubCutaneous every 8 hours    OTHER:  atorvastatin 80 milliGRAM(s) Oral at bedtime  dextrose 50% Injectable 12.5 Gram(s) IV Push once  dextrose Gel 1 Dose(s) Oral once PRN  docusate sodium 100 milliGRAM(s) Oral three times a day  glucagon  Injectable 1 milliGRAM(s) IntraMuscular once PRN  insulin lispro (HumaLOG) corrective regimen sliding scale   SubCutaneous every 6 hours  senna 2 Tablet(s) Oral at bedtime    IVF:  dextrose 5%. 1000 milliLiter(s) IV Continuous <Continuous>    CULTURES:    RADIOLOGY & ADDITIONAL TESTS:      ASSESSMENT:  52y Male w/ acute cerebrovascular accident, L M2 occlusion, s/p thrombectomy (04/05/2018, Dr. Mota) s/p TPA    - Agree with Neurology plan, appreciated Heme recs  - Will sign off at this point  - Follow up with Dr. Mota upon discharge from AR  DISPOSITION: AR

## 2018-04-07 NOTE — DISCHARGE NOTE ADULT - ADDITIONAL INSTRUCTIONS
Follow up with Dr. Mota upon discharge from rehab Follow up with Dr. Mota, Dr. Mcfadden and Dr. Rodriguez upon discharge from rehab.

## 2018-04-07 NOTE — PROGRESS NOTE ADULT - PROBLEM SELECTOR PLAN 1
Pt with stroke code at [facility], where CT head showed left M2 occlusion with R-sided deficits (hemiplegia, R facial droop), CTA showing same L M2 occlusion, now s/p thrombectomy. Heme/onc on board to evaluate coagulopathy.  - c/w Lipitor 80mg  - c/w aspirin 300mg rectal daily  - A1c 5.1, cholesterol 210, HDL 65, LDL 90,   - permissive HTN -160s  - neuro checks q4h for hemorrhagic conversion  - heme/onc Dr. Rodriguez following, f/u recs (+anticardiolipin Ab)  - US lower extremity dopplers neg for DVT  - TTE (4/6): EF 60%, trace TR, MR, IA; no interatrial shunt  - pending BRANDON  - pending S&S eval

## 2018-04-07 NOTE — DISCHARGE NOTE ADULT - PLAN OF CARE
Once you are at home call the office to make a follow up appointment with Dr Mota  [phone] Improve symptoms of weakness. Discharge to acute rehabilitation. You were found to have a left side (MCA) stroke with a thrombectomy. You underwent work up for your stroke for which you were found to have +anticardiolipin antibody and MTHFR You still have weakness in your right upper extremity for which you were recommended for acute rehabilitation for further management. Dr Mota  [phone] You were found to have a left side (MCA) stroke with a thrombectomy. You underwent work up for your stroke for which you were found to have +anticardiolipin antibody and MTHFR that puts you at an increased risk of clotting. You were also found to have a left atrial clot in the heart for which you have been on anticoagulation (blood thinner) with serial transesophageal echocardiograms, it has been determined that you are stable on anticoagulation without other intervention. You still have weakness in your right upper extremity for which you were recommended for acute rehabilitation for further management. You will need to follow up with Dr. Mcfadden, Dr Mota, and Dr. Rodriguez upon discharge. You were found to have a clot in your heart likely secondary to your clotting disorder. You were on IV heparin and closely monitored in the telemetry unit. You had serial transesophageal echocardiograms that showed stabilization of the clot that will warrant further anticoagulation (blood thinners). You will be bridged to coumadin with lovenox injections upon discharge. You require no further interventions at this time. Cardiology clearance. You will be discharged to acute rehabilitation and to follow up with Dr. Mcfadden, Dr. Mota and Dr. Rodriguez upon discharge from acute rehabilitation.

## 2018-04-07 NOTE — PROGRESS NOTE ADULT - ATTENDING COMMENTS
patient doing better. Able to stand with min assistance. aphasic but saying his name and I am fine. Following commands.   Imp acute stroke - cont asa and plavix, statin  acute rehab  BRANDON

## 2018-04-07 NOTE — DISCHARGE NOTE ADULT - PROVIDER TOKENS
TOKEN:'[id number]',TOKEN:'[id number]' TOKEN:'[id number]',TOKEN:'[id number]',TOKEN:'[id number]'

## 2018-04-07 NOTE — DISCHARGE NOTE ADULT - CARE PROVIDERS DIRECT ADDRESSES
[email or web address]

## 2018-04-07 NOTE — DISCHARGE NOTE ADULT - COMMUNITY RESOURCES
acute rehab, [facility], [address] [facility], [place] [address], phone [phone]

## 2018-04-07 NOTE — DISCHARGE NOTE ADULT - HOSPITAL COURSE
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 Pt taken to [facility], where stroke code was called with CT head showing left M2 occlusion, no large territory infarct, given tPA and transferred to [facility] for thrombectomy. Pt found to be aphasic, R facial droop, R hemiplegia and left gaze preference with small hematoma over R eye. CTA head showed left M2 occlusion and taken for thrombectomy. Post-thrombectomy, repeat CT showed no hemorrhagic conversion. PT evaluated patient and deemed appropriate for acute rehab. BRANDON showed large L atrial appendage thrombus, for which pt was started on heparin gtt. Hypercoagulability workup showed anticardiolipin Ab+ and heterogeneous MTHFR gene mutation. Repeat BRANDON on 4/18 demonstrated evidence of decrease LA clot size and lysis within the clot. With improving clot size, determined not to be a candidate for CT surgical intervention and to continue on heparin drip. Given that patient remained increased risk for embolization, remained on frequent neuro checks on [place]. Patient remained on heparin drip with PTT goal of 60-90. With single episode of subtherapeutic PTT, patient neuro checks increased to q2h and subsequently decreased frequency when therapeutic again (q4h). 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 Pt taken to [facility], where stroke code was called with CT head showing left M2 occlusion, no large territory infarct, given tPA and transferred to [facility] for thrombectomy. Pt found to be aphasic, R facial droop, R hemiplegia and left gaze preference with small hematoma over R eye. CTA head showed left M2 occlusion and taken for thrombectomy. Post-thrombectomy, repeat CT showed no hemorrhagic conversion. PT evaluated patient and deemed appropriate for acute rehab. BRANDON showed large L atrial appendage thrombus, for which pt was started on heparin gtt. Hypercoagulability workup showed anticardiolipin Ab+ and heterogeneous MTHFR gene mutation. Repeat BRANDON on 4/18 demonstrated evidence of decrease LA clot size and lysis within the clot. With improving clot size, determined not to be a candidate for CT surgical intervention and to continue on heparin drip. Given that patient remained increased risk for embolization, remained on frequent neuro checks on [place]. Patient remained on heparin drip with PTT goal of 60-90. With single episode of subtherapeutic PTT, patient neuro checks increased to q2h and subsequently decreased frequency when therapeutic again (q4h). Patient underwent additional BRANDON for which demonstrated some improvement in clot size with echogenicity suggestive of intraclot break down. Patient underwent 3 weeks of anticoagulation for which repeat BRANDON demonstrated unchanged clot size. CT structural heart noted that atrial mass was consistent with blood clot. Patient determined to that with 3 weeks of continued anticoagulation, clot is stabilized for continued (lifetime) anticoagulation without additional intervention. Patient undergoing lovenox to coumadin bridge and stable for discharge to acute rehabilitation. Patient to follow up with Dr. Mcfadden (neurology), Dr. Mota (neurosurgery) and Dr. Helms (hematology) for further management as an outpatient.

## 2018-04-08 DIAGNOSIS — R76.8 OTHER SPECIFIED ABNORMAL IMMUNOLOGICAL FINDINGS IN SERUM: ICD-10-CM

## 2018-04-08 LAB
ANION GAP SERPL CALC-SCNC: 12 MMOL/L — SIGNIFICANT CHANGE UP (ref 5–17)
APTT BLD: 30.9 SEC — SIGNIFICANT CHANGE UP (ref 27.5–37.4)
BUN SERPL-MCNC: 12 MG/DL — SIGNIFICANT CHANGE UP (ref 7–23)
CALCIUM SERPL-MCNC: 9.2 MG/DL — SIGNIFICANT CHANGE UP (ref 8.4–10.5)
CHLORIDE SERPL-SCNC: 100 MMOL/L — SIGNIFICANT CHANGE UP (ref 96–108)
CO2 SERPL-SCNC: 26 MMOL/L — SIGNIFICANT CHANGE UP (ref 22–31)
CREAT SERPL-MCNC: 1.39 MG/DL — HIGH (ref 0.5–1.3)
GLUCOSE SERPL-MCNC: 97 MG/DL — SIGNIFICANT CHANGE UP (ref 70–99)
HCT VFR BLD CALC: 42.5 % — SIGNIFICANT CHANGE UP (ref 39–50)
HGB BLD-MCNC: 14 G/DL — SIGNIFICANT CHANGE UP (ref 13–17)
INR BLD: 1.13 — SIGNIFICANT CHANGE UP (ref 0.88–1.16)
MAGNESIUM SERPL-MCNC: 1.7 MG/DL — SIGNIFICANT CHANGE UP (ref 1.6–2.6)
MCHC RBC-ENTMCNC: 28.5 PG — SIGNIFICANT CHANGE UP (ref 27–34)
MCHC RBC-ENTMCNC: 32.9 G/DL — SIGNIFICANT CHANGE UP (ref 32–36)
MCV RBC AUTO: 86.4 FL — SIGNIFICANT CHANGE UP (ref 80–100)
PHOSPHATE SERPL-MCNC: 3.3 MG/DL — SIGNIFICANT CHANGE UP (ref 2.5–4.5)
PLATELET # BLD AUTO: 223 K/UL — SIGNIFICANT CHANGE UP (ref 150–400)
POTASSIUM SERPL-MCNC: 3.8 MMOL/L — SIGNIFICANT CHANGE UP (ref 3.5–5.3)
POTASSIUM SERPL-SCNC: 3.8 MMOL/L — SIGNIFICANT CHANGE UP (ref 3.5–5.3)
PROTHROM AB SERPL-ACNC: 12.6 SEC — SIGNIFICANT CHANGE UP (ref 9.8–12.7)
RBC # BLD: 4.92 M/UL — SIGNIFICANT CHANGE UP (ref 4.2–5.8)
RBC # FLD: 13.8 % — SIGNIFICANT CHANGE UP (ref 10.3–16.9)
SODIUM SERPL-SCNC: 138 MMOL/L — SIGNIFICANT CHANGE UP (ref 135–145)
WBC # BLD: 6 K/UL — SIGNIFICANT CHANGE UP (ref 3.8–10.5)
WBC # FLD AUTO: 6 K/UL — SIGNIFICANT CHANGE UP (ref 3.8–10.5)

## 2018-04-08 PROCEDURE — 99233 SBSQ HOSP IP/OBS HIGH 50: CPT

## 2018-04-08 PROCEDURE — 99232 SBSQ HOSP IP/OBS MODERATE 35: CPT

## 2018-04-08 RX ORDER — ENOXAPARIN SODIUM 100 MG/ML
90 INJECTION SUBCUTANEOUS
Qty: 0 | Refills: 0 | Status: DISCONTINUED | OUTPATIENT
Start: 2018-04-08 | End: 2018-04-09

## 2018-04-08 RX ORDER — WARFARIN SODIUM 2.5 MG/1
3 TABLET ORAL ONCE
Qty: 0 | Refills: 0 | Status: COMPLETED | OUTPATIENT
Start: 2018-04-08 | End: 2018-04-08

## 2018-04-08 RX ORDER — ESCITALOPRAM OXALATE 10 MG/1
5 TABLET, FILM COATED ORAL DAILY
Qty: 0 | Refills: 0 | Status: DISCONTINUED | OUTPATIENT
Start: 2018-04-08 | End: 2018-04-09

## 2018-04-08 RX ORDER — ENOXAPARIN SODIUM 100 MG/ML
90 INJECTION SUBCUTANEOUS
Qty: 0 | Refills: 0 | Status: DISCONTINUED | OUTPATIENT
Start: 2018-04-08 | End: 2018-04-08

## 2018-04-08 RX ADMIN — ATORVASTATIN CALCIUM 80 MILLIGRAM(S): 80 TABLET, FILM COATED ORAL at 22:31

## 2018-04-08 RX ADMIN — Medication 100 MILLIGRAM(S): at 22:31

## 2018-04-08 RX ADMIN — Medication 2: at 11:00

## 2018-04-08 RX ADMIN — Medication 100 MILLIGRAM(S): at 06:42

## 2018-04-08 RX ADMIN — WARFARIN SODIUM 3 MILLIGRAM(S): 2.5 TABLET ORAL at 22:30

## 2018-04-08 RX ADMIN — HEPARIN SODIUM 5000 UNIT(S): 5000 INJECTION INTRAVENOUS; SUBCUTANEOUS at 06:43

## 2018-04-08 RX ADMIN — Medication 81 MILLIGRAM(S): at 12:27

## 2018-04-08 RX ADMIN — Medication 100 MILLIGRAM(S): at 13:11

## 2018-04-08 RX ADMIN — ESCITALOPRAM OXALATE 5 MILLIGRAM(S): 10 TABLET, FILM COATED ORAL at 13:40

## 2018-04-08 RX ADMIN — ENOXAPARIN SODIUM 90 MILLIGRAM(S): 100 INJECTION SUBCUTANEOUS at 17:19

## 2018-04-08 RX ADMIN — SENNA PLUS 2 TABLET(S): 8.6 TABLET ORAL at 22:31

## 2018-04-08 NOTE — PROGRESS NOTE ADULT - SUBJECTIVE AND OBJECTIVE BOX
HEALTH ISSUES - PROBLEM Dx:  Prophylactic measure: Prophylactic measure  Nutrition, metabolism, and development symptoms: Nutrition, metabolism, and development symptoms  MARIEOLS (acute kidney injury): MARIELOS (acute kidney injury)  Palpitations: Palpitations  Cerebrovascular accident (CVA) due to occlusion of left middle cerebral artery: Cerebrovascular accident (CVA) due to occlusion of left middle cerebral artery          CHEMOTHERAPY REGIMEN:        Day:                          Diet:  Protocol:                                    IVF:      MEDICATIONS  (STANDING):  aspirin enteric coated 81 milliGRAM(s) Oral daily  atorvastatin 80 milliGRAM(s) Oral at bedtime  dextrose 5%. 1000 milliLiter(s) (50 mL/Hr) IV Continuous <Continuous>  dextrose 50% Injectable 12.5 Gram(s) IV Push once  docusate sodium 100 milliGRAM(s) Oral three times a day  enoxaparin Injectable 90 milliGRAM(s) SubCutaneous two times a day  escitalopram 5 milliGRAM(s) Oral daily  insulin lispro (HumaLOG) corrective regimen sliding scale   SubCutaneous Before meals and at bedtime  senna 2 Tablet(s) Oral at bedtime    MEDICATIONS  (PRN):  acetaminophen   Tablet 650 milliGRAM(s) Oral every 6 hours PRN For Temp greater than 38 C (100.4 F)  acetaminophen   Tablet. 650 milliGRAM(s) Oral every 6 hours PRN Mild Pain (1 - 3)  dextrose Gel 1 Dose(s) Oral once PRN Blood Glucose LESS THAN 70 milliGRAM(s)/deciliter  glucagon  Injectable 1 milliGRAM(s) IntraMuscular once PRN Glucose LESS THAN 70 milligrams/deciliter  ondansetron Injectable 4 milliGRAM(s) IV Push every 6 hours PRN Nausea and/or Vomiting      Allergies    Allergy Status Unknown    Intolerances        DVT Prophylaxis: [ ] YES [ ] NO      Antibiotics: [ ] YES [ ] NO    Pain Scale (1-10):       Location:    Vital Signs Last 24 Hrs  T(C): 37.1 (08 Apr 2018 09:00), Max: 37.1 (07 Apr 2018 17:10)  T(F): 98.8 (08 Apr 2018 09:00), Max: 98.8 (07 Apr 2018 17:10)  HR: 64 (08 Apr 2018 12:18) (54 - 72)  BP: 142/86 (08 Apr 2018 12:18) (133/85 - 162/91)  BP(mean): 108 (08 Apr 2018 12:18) (108 - 120)  RR: 18 (08 Apr 2018 12:18) (17 - 20)  SpO2: 96% (08 Apr 2018 12:18) (95% - 98%)    Drug Dosing Weight  Height (cm): 180.34 (05 Apr 2018 09:00)  Weight (kg): 92.3 (05 Apr 2018 09:00)  BMI (kg/m2): 28.4 (05 Apr 2018 09:00)  BSA (m2): 2.12 (05 Apr 2018 09:00)     Physical Exam:  · Constitutional	detailed exam  · Constitutional Details	well-developed; well-groomed  · Eyes	EOMI; PERRL; no drainage or redness  · ENMT Comments	dry mucous membranes  · Respiratory	detailed exam  · Respiratory Comments	normal breath sounds at the lung bases bilaterally  · Cardiovascular	Regular rate & rhythm, normal S1, S2; no murmurs, gallops or rubs; no S3, S4  · Abd-Soft non tender  ·Ext-no edema, clubbing or cyanosis    URINARY CATHETER: [ ] YES [ ] NO     LABS:  CBC Full  -  ( 08 Apr 2018 06:17 )  WBC Count : 6.0 K/uL  Hemoglobin : 14.0 g/dL  Hematocrit : 42.5 %  Platelet Count - Automated : 223 K/uL  Mean Cell Volume : 86.4 fL  Mean Cell Hemoglobin : 28.5 pg  Mean Cell Hemoglobin Concentration : 32.9 g/dL  Auto Neutrophil # : x  Auto Lymphocyte # : x  Auto Monocyte # : x  Auto Eosinophil # : x  Auto Basophil # : x  Auto Neutrophil % : x  Auto Lymphocyte % : x  Auto Monocyte % : x  Auto Eosinophil % : x  Auto Basophil % : x    04-08    138  |  100  |  12  ----------------------------<  97  3.8   |  26  |  1.39<H>    Ca    9.2      08 Apr 2018 06:17  Phos  3.3     04-08  Mg     1.7     04-08            CULTURES:    RADIOLOGY & ADDITIONAL STUDIES:

## 2018-04-08 NOTE — PROGRESS NOTE ADULT - SUBJECTIVE AND OBJECTIVE BOX
INTERVAL EVENTS: Patient seen and examined at bedside. No acute events overnight. Vital signs stable. Denies fevers, chills, CP, SOB, abdominal pain, N/V/D, urinary symptoms. Found to have cardiolipin AB +, started on Lovenox to Coumadin bridge.     OBJECTIVE:    Vital Signs Last 12 Hrs  T(F): 98.4 (04-08-18 @ 13:38), Max: 98.8 (04-08-18 @ 09:00)  HR: 64 (04-08-18 @ 12:18) (62 - 72)  BP: 142/86 (04-08-18 @ 12:18) (142/86 - 162/91)  BP(mean): 108 (04-08-18 @ 12:18) (108 - 120)  RR: 18 (04-08-18 @ 12:18) (18 - 18)  SpO2: 96% (04-08-18 @ 12:18) (95% - 98%)  I&O's Summary    07 Apr 2018 07:01  -  08 Apr 2018 07:00  --------------------------------------------------------  IN: 0 mL / OUT: 3090 mL / NET: -3090 mL        PHYSICAL EXAM:    General: sitting in chair, comfortably, in NAD  HEENT: EOMI, anicteric, R facial droop, able to move bilateral eyebrows R>L  Neck: supple, no LAD  Cardiovascular: +S1/S2; RRR  Respiratory: CTA B/L; no W/R/R  Gastrointestinal: soft, NT/ND; +BSx4  Extremities: WWP; no edema, clubbing or cyanosis  Vascular: 2+ radial, DP/PT pulses B/L  Neurological: AOx3; improved speech, R facial droop, able to move bilateral eyebrows R>L, 5/5 strength LUE and LLE, 0/5 RUE unable to lift right arm, 4/5 RLE strength        LABS:                        14.0   6.0   )-----------( 223      ( 08 Apr 2018 06:17 )             42.5     04-08    138  |  100  |  12  ----------------------------<  97  3.8   |  26  |  1.39<H>    Ca    9.2      08 Apr 2018 06:17  Phos  3.3     04-08  Mg     1.7     04-08            RADIOLOGY & ADDITIONAL TESTS:    MEDICATIONS  (STANDING):  aspirin enteric coated 81 milliGRAM(s) Oral daily  atorvastatin 80 milliGRAM(s) Oral at bedtime  dextrose 5%. 1000 milliLiter(s) (50 mL/Hr) IV Continuous <Continuous>  dextrose 50% Injectable 12.5 Gram(s) IV Push once  docusate sodium 100 milliGRAM(s) Oral three times a day  enoxaparin Injectable 90 milliGRAM(s) SubCutaneous two times a day  escitalopram 5 milliGRAM(s) Oral daily  insulin lispro (HumaLOG) corrective regimen sliding scale   SubCutaneous Before meals and at bedtime  senna 2 Tablet(s) Oral at bedtime    MEDICATIONS  (PRN):  acetaminophen   Tablet 650 milliGRAM(s) Oral every 6 hours PRN For Temp greater than 38 C (100.4 F)  acetaminophen   Tablet. 650 milliGRAM(s) Oral every 6 hours PRN Mild Pain (1 - 3)  dextrose Gel 1 Dose(s) Oral once PRN Blood Glucose LESS THAN 70 milliGRAM(s)/deciliter  glucagon  Injectable 1 milliGRAM(s) IntraMuscular once PRN Glucose LESS THAN 70 milligrams/deciliter  ondansetron Injectable 4 milliGRAM(s) IV Push every 6 hours PRN Nausea and/or Vomiting

## 2018-04-08 NOTE — PROGRESS NOTE ADULT - ATTENDING COMMENTS
patient doing better. Able to stand with min assistance. aphasic but saying his name and I am fine. Following commands.   Imp acute stroke - cont asa and plavix, statin  acute rehab  BRANDON patient doing better. Able to walk with min assistance. aphasic but saying his name and I am fine. Following commands.   Imp acute stroke - anticardiolipin positive - started on lovenox and then coumadin  acute rehab at [facility]

## 2018-04-08 NOTE — PROGRESS NOTE ADULT - ASSESSMENT
51yo M with no PMH presenting with acute left M2 occlusion from [facility] (s/p tPA), now s/p thrombectomy, pending BRANDON and S&S eval.

## 2018-04-08 NOTE — PROGRESS NOTE ADULT - ASSESSMENT
recovering slowly...found to have IgG anticardiolipin Ab's, will start on Lovenox/Coumadin tonight. Discussed in detail with pt, wife, Dr. Mcfadden

## 2018-04-08 NOTE — PROGRESS NOTE ADULT - PROBLEM SELECTOR PLAN 1
Pt with stroke code at [facility], where CT head showed left M2 occlusion with R-sided deficits (hemiplegia, R facial droop), CTA showing same L M2 occlusion, now s/p thrombectomy. Heme/onc on board to evaluate coagulopathy.  - c/w Lipitor 80mg  - c/w aspirin 300mg rectal daily  - A1c 5.1, cholesterol 210, HDL 65, LDL 90,   - permissive HTN -160s  - neuro checks q4h for hemorrhagic conversion  - Heme/onc Dr. Rodriguez following, f/u recs (+anticardiolipin Ab): Started on Lovenox 90mg BID and bridged to coumadin 3mg tonight  - US lower extremity dopplers neg for DVT  - TTE (4/6): EF 60%, trace TR, MR, CO; no interatrial shunt  - pending BRANDON  - pending S&S eval  - Lexapro 5mg daily for motor recovery

## 2018-04-08 NOTE — PROGRESS NOTE ADULT - PROBLEM SELECTOR PLAN 3
Pt with unknown Cr baseline, however with Cr 1.38 on admission. Likely 2/2 IV contrast  - continue to trend BMP  - off IVFs

## 2018-04-09 LAB
ANION GAP SERPL CALC-SCNC: 11 MMOL/L — SIGNIFICANT CHANGE UP (ref 5–17)
APTT BLD: 101.8 SEC — HIGH (ref 27.5–37.4)
APTT BLD: 40.8 SEC — HIGH (ref 27.5–37.4)
BUN SERPL-MCNC: 14 MG/DL — SIGNIFICANT CHANGE UP (ref 7–23)
CALCIUM SERPL-MCNC: 9.8 MG/DL — SIGNIFICANT CHANGE UP (ref 8.4–10.5)
CHLORIDE SERPL-SCNC: 97 MMOL/L — SIGNIFICANT CHANGE UP (ref 96–108)
CO2 SERPL-SCNC: 25 MMOL/L — SIGNIFICANT CHANGE UP (ref 22–31)
CREAT SERPL-MCNC: 1.41 MG/DL — HIGH (ref 0.5–1.3)
GLUCOSE SERPL-MCNC: 102 MG/DL — HIGH (ref 70–99)
HCT VFR BLD CALC: 45 % — SIGNIFICANT CHANGE UP (ref 39–50)
HGB BLD-MCNC: 15 G/DL — SIGNIFICANT CHANGE UP (ref 13–17)
INR BLD: 1.16 — SIGNIFICANT CHANGE UP (ref 0.88–1.16)
MAGNESIUM SERPL-MCNC: 1.6 MG/DL — SIGNIFICANT CHANGE UP (ref 1.6–2.6)
MCHC RBC-ENTMCNC: 28.4 PG — SIGNIFICANT CHANGE UP (ref 27–34)
MCHC RBC-ENTMCNC: 33.3 G/DL — SIGNIFICANT CHANGE UP (ref 32–36)
MCV RBC AUTO: 85.2 FL — SIGNIFICANT CHANGE UP (ref 80–100)
PLATELET # BLD AUTO: 270 K/UL — SIGNIFICANT CHANGE UP (ref 150–400)
POTASSIUM SERPL-MCNC: 4.2 MMOL/L — SIGNIFICANT CHANGE UP (ref 3.5–5.3)
POTASSIUM SERPL-SCNC: 4.2 MMOL/L — SIGNIFICANT CHANGE UP (ref 3.5–5.3)
PROTHROM AB SERPL-ACNC: 12.9 SEC — HIGH (ref 9.8–12.7)
RBC # BLD: 5.28 M/UL — SIGNIFICANT CHANGE UP (ref 4.2–5.8)
RBC # FLD: 13.4 % — SIGNIFICANT CHANGE UP (ref 10.3–16.9)
SODIUM SERPL-SCNC: 133 MMOL/L — LOW (ref 135–145)
WBC # BLD: 6.8 K/UL — SIGNIFICANT CHANGE UP (ref 3.8–10.5)
WBC # FLD AUTO: 6.8 K/UL — SIGNIFICANT CHANGE UP (ref 3.8–10.5)

## 2018-04-09 PROCEDURE — 93320 DOPPLER ECHO COMPLETE: CPT | Mod: 26

## 2018-04-09 PROCEDURE — 93325 DOPPLER ECHO COLOR FLOW MAPG: CPT | Mod: 26

## 2018-04-09 PROCEDURE — 93312 ECHO TRANSESOPHAGEAL: CPT | Mod: 26

## 2018-04-09 PROCEDURE — 99233 SBSQ HOSP IP/OBS HIGH 50: CPT

## 2018-04-09 RX ORDER — HEPARIN SODIUM 5000 [USP'U]/ML
1700 INJECTION INTRAVENOUS; SUBCUTANEOUS
Qty: 25000 | Refills: 0 | Status: DISCONTINUED | OUTPATIENT
Start: 2018-04-09 | End: 2018-04-09

## 2018-04-09 RX ORDER — ESCITALOPRAM OXALATE 10 MG/1
5 TABLET, FILM COATED ORAL DAILY
Qty: 0 | Refills: 0 | Status: DISCONTINUED | OUTPATIENT
Start: 2018-04-10 | End: 2018-05-01

## 2018-04-09 RX ORDER — DONEPEZIL HYDROCHLORIDE 10 MG/1
5 TABLET, FILM COATED ORAL AT BEDTIME
Qty: 0 | Refills: 0 | Status: DISCONTINUED | OUTPATIENT
Start: 2018-04-09 | End: 2018-05-01

## 2018-04-09 RX ORDER — ASPIRIN/CALCIUM CARB/MAGNESIUM 324 MG
81 TABLET ORAL DAILY
Qty: 0 | Refills: 0 | Status: DISCONTINUED | OUTPATIENT
Start: 2018-04-10 | End: 2018-04-20

## 2018-04-09 RX ORDER — HEPARIN SODIUM 5000 [USP'U]/ML
1400 INJECTION INTRAVENOUS; SUBCUTANEOUS
Qty: 25000 | Refills: 0 | Status: DISCONTINUED | OUTPATIENT
Start: 2018-04-09 | End: 2018-04-10

## 2018-04-09 RX ADMIN — ATORVASTATIN CALCIUM 80 MILLIGRAM(S): 80 TABLET, FILM COATED ORAL at 21:46

## 2018-04-09 RX ADMIN — HEPARIN SODIUM 17 UNIT(S)/HR: 5000 INJECTION INTRAVENOUS; SUBCUTANEOUS at 16:24

## 2018-04-09 RX ADMIN — HEPARIN SODIUM 17 UNIT(S)/HR: 5000 INJECTION INTRAVENOUS; SUBCUTANEOUS at 16:53

## 2018-04-09 RX ADMIN — DONEPEZIL HYDROCHLORIDE 5 MILLIGRAM(S): 10 TABLET, FILM COATED ORAL at 21:46

## 2018-04-09 RX ADMIN — ESCITALOPRAM OXALATE 5 MILLIGRAM(S): 10 TABLET, FILM COATED ORAL at 11:48

## 2018-04-09 RX ADMIN — ENOXAPARIN SODIUM 90 MILLIGRAM(S): 100 INJECTION SUBCUTANEOUS at 06:12

## 2018-04-09 NOTE — PROGRESS NOTE ADULT - ASSESSMENT
53yo M with no PMH presenting with acute left M2 occlusion from [facility] (s/p tPA), now s/p thrombectomy, pending BRANDON.

## 2018-04-09 NOTE — PROGRESS NOTE ADULT - SUBJECTIVE AND OBJECTIVE BOX
CC: Patient is a 52y old  Male who presents with a chief complaint of CVA (07 Apr 2018 16:49)    OVERNIGHT EVENTS: NAEO, started on Lovenox 90mg BID, Coumadin 3mg x1    SUBJECTIVE / INTERVAL HPI: Patient seen and examined at bedside.     VITAL SIGNS:  Vital Signs Last 24 Hrs  T(C): 36.5 (09 Apr 2018 06:02), Max: 37.1 (08 Apr 2018 09:00)  T(F): 97.7 (09 Apr 2018 06:02), Max: 98.8 (08 Apr 2018 09:00)  HR: 56 (09 Apr 2018 04:00) (54 - 72)  BP: 128/83 (09 Apr 2018 04:00) (127/79 - 162/91)  BP(mean): 102 (09 Apr 2018 04:00) (99 - 120)  RR: 16 (09 Apr 2018 04:00) (16 - 20)  SpO2: 96% (09 Apr 2018 04:00) (95% - 98%)    PHYSICAL EXAM:    General: WDWN  HEENT: NC/AT; PERRL, anicteric sclera; MMM  Neck: supple  Cardiovascular: +S1/S2; RRR  Respiratory: CTA B/L; no W/R/R  Gastrointestinal: soft, NT/ND; +BSx4  Extremities: WWP; no edema, clubbing or cyanosis  Vascular: 2+ radial, DP/PT pulses B/L  Neurological: alert awake oriented; no focal deficits    MEDICATIONS:  MEDICATIONS  (STANDING):  aspirin enteric coated 81 milliGRAM(s) Oral daily  atorvastatin 80 milliGRAM(s) Oral at bedtime  dextrose 5%. 1000 milliLiter(s) (50 mL/Hr) IV Continuous <Continuous>  dextrose 50% Injectable 12.5 Gram(s) IV Push once  docusate sodium 100 milliGRAM(s) Oral three times a day  enoxaparin Injectable 90 milliGRAM(s) SubCutaneous two times a day  escitalopram 5 milliGRAM(s) Oral daily  insulin lispro (HumaLOG) corrective regimen sliding scale   SubCutaneous Before meals and at bedtime  senna 2 Tablet(s) Oral at bedtime    MEDICATIONS  (PRN):  acetaminophen   Tablet 650 milliGRAM(s) Oral every 6 hours PRN For Temp greater than 38 C (100.4 F)  acetaminophen   Tablet. 650 milliGRAM(s) Oral every 6 hours PRN Mild Pain (1 - 3)  dextrose Gel 1 Dose(s) Oral once PRN Blood Glucose LESS THAN 70 milliGRAM(s)/deciliter  glucagon  Injectable 1 milliGRAM(s) IntraMuscular once PRN Glucose LESS THAN 70 milligrams/deciliter  ondansetron Injectable 4 milliGRAM(s) IV Push every 6 hours PRN Nausea and/or Vomiting      ALLERGIES:  Allergies    Allergy Status Unknown    Intolerances        LABS:                        14.0   6.0   )-----------( 223      ( 08 Apr 2018 06:17 )             42.5     04-08    138  |  100  |  12  ----------------------------<  97  3.8   |  26  |  1.39<H>    Ca    9.2      08 Apr 2018 06:17  Phos  3.3     04-08  Mg     1.7     04-08      PT/INR - ( 08 Apr 2018 15:17 )   PT: 12.6 sec;   INR: 1.13          PTT - ( 08 Apr 2018 15:17 )  PTT:30.9 sec    CAPILLARY BLOOD GLUCOSE      POCT Blood Glucose.: 91 mg/dL (09 Apr 2018 07:02)      RADIOLOGY & ADDITIONAL TESTS: Reviewed. CC: Patient is a 52y old  Male who presents with a chief complaint of CVA (07 Apr 2018 16:49)    OVERNIGHT EVENTS: NAEO, started on Lovenox 90mg BID, Coumadin 3mg x1. NPO for BRANDON today.    SUBJECTIVE / INTERVAL HPI: Patient seen and examined at bedside.     VITAL SIGNS:  Vital Signs Last 24 Hrs  T(C): 36.5 (09 Apr 2018 06:02), Max: 37.1 (08 Apr 2018 09:00)  T(F): 97.7 (09 Apr 2018 06:02), Max: 98.8 (08 Apr 2018 09:00)  HR: 56 (09 Apr 2018 04:00) (54 - 72)  BP: 128/83 (09 Apr 2018 04:00) (127/79 - 162/91)  BP(mean): 102 (09 Apr 2018 04:00) (99 - 120)  RR: 16 (09 Apr 2018 04:00) (16 - 20)  SpO2: 96% (09 Apr 2018 04:00) (95% - 98%)    PHYSICAL EXAM:    General: WDWN  HEENT: NC/AT; PERRL, anicteric sclera; MMM  Neck: supple  Cardiovascular: +S1/S2; RRR  Respiratory: CTA B/L; no W/R/R  Gastrointestinal: soft, NT/ND; +BSx4  Extremities: WWP; no edema, clubbing or cyanosis  Vascular: 2+ radial, DP/PT pulses B/L  Neurological: alert awake oriented; no focal deficits    MEDICATIONS:  MEDICATIONS  (STANDING):  aspirin enteric coated 81 milliGRAM(s) Oral daily  atorvastatin 80 milliGRAM(s) Oral at bedtime  dextrose 5%. 1000 milliLiter(s) (50 mL/Hr) IV Continuous <Continuous>  dextrose 50% Injectable 12.5 Gram(s) IV Push once  docusate sodium 100 milliGRAM(s) Oral three times a day  enoxaparin Injectable 90 milliGRAM(s) SubCutaneous two times a day  escitalopram 5 milliGRAM(s) Oral daily  insulin lispro (HumaLOG) corrective regimen sliding scale   SubCutaneous Before meals and at bedtime  senna 2 Tablet(s) Oral at bedtime    MEDICATIONS  (PRN):  acetaminophen   Tablet 650 milliGRAM(s) Oral every 6 hours PRN For Temp greater than 38 C (100.4 F)  acetaminophen   Tablet. 650 milliGRAM(s) Oral every 6 hours PRN Mild Pain (1 - 3)  dextrose Gel 1 Dose(s) Oral once PRN Blood Glucose LESS THAN 70 milliGRAM(s)/deciliter  glucagon  Injectable 1 milliGRAM(s) IntraMuscular once PRN Glucose LESS THAN 70 milligrams/deciliter  ondansetron Injectable 4 milliGRAM(s) IV Push every 6 hours PRN Nausea and/or Vomiting      ALLERGIES:  Allergies    Allergy Status Unknown    Intolerances        LABS:                        14.0   6.0   )-----------( 223      ( 08 Apr 2018 06:17 )             42.5     04-08    138  |  100  |  12  ----------------------------<  97  3.8   |  26  |  1.39<H>    Ca    9.2      08 Apr 2018 06:17  Phos  3.3     04-08  Mg     1.7     04-08      PT/INR - ( 08 Apr 2018 15:17 )   PT: 12.6 sec;   INR: 1.13          PTT - ( 08 Apr 2018 15:17 )  PTT:30.9 sec    CAPILLARY BLOOD GLUCOSE      POCT Blood Glucose.: 91 mg/dL (09 Apr 2018 07:02)      RADIOLOGY & ADDITIONAL TESTS: Reviewed. CC: Patient is a 52y old  Male who presents with a chief complaint of CVA (07 Apr 2018 16:49)    OVERNIGHT EVENTS: NAEO, started on Lovenox 90mg BID, Coumadin 3mg x1. NPO for BRANDON today.    SUBJECTIVE / INTERVAL HPI: Patient seen and examined at bedside.     VITAL SIGNS:  Vital Signs Last 24 Hrs  T(C): 36.5 (09 Apr 2018 06:02), Max: 37.1 (08 Apr 2018 09:00)  T(F): 97.7 (09 Apr 2018 06:02), Max: 98.8 (08 Apr 2018 09:00)  HR: 56 (09 Apr 2018 04:00) (54 - 72)  BP: 128/83 (09 Apr 2018 04:00) (127/79 - 162/91)  BP(mean): 102 (09 Apr 2018 04:00) (99 - 120)  RR: 16 (09 Apr 2018 04:00) (16 - 20)  SpO2: 96% (09 Apr 2018 04:00) (95% - 98%)    PHYSICAL EXAM:    General: sitting in chair, comfortably, in NAD  HEENT: EOMI, anicteric, R facial droop, able to move eyebrows R>L  Neck: supple, no LAD  Cardiovascular: +S1/S2; RRR  Respiratory: CTA B/L; no W/R/R  Gastrointestinal: soft, NT/ND; +BSx4  Extremities: WWP; no edema, clubbing or cyanosis  Vascular: 2+ radial, DP/PT pulses B/L  Neurological: AOx3; improved speech, R facial droop, 5/5 strength LUE and LLE, 1/5 RUE strength , 5/5 RLE strength    MEDICATIONS:  MEDICATIONS  (STANDING):  aspirin enteric coated 81 milliGRAM(s) Oral daily  atorvastatin 80 milliGRAM(s) Oral at bedtime  dextrose 5%. 1000 milliLiter(s) (50 mL/Hr) IV Continuous <Continuous>  dextrose 50% Injectable 12.5 Gram(s) IV Push once  docusate sodium 100 milliGRAM(s) Oral three times a day  enoxaparin Injectable 90 milliGRAM(s) SubCutaneous two times a day  escitalopram 5 milliGRAM(s) Oral daily  insulin lispro (HumaLOG) corrective regimen sliding scale   SubCutaneous Before meals and at bedtime  senna 2 Tablet(s) Oral at bedtime    MEDICATIONS  (PRN):  acetaminophen   Tablet 650 milliGRAM(s) Oral every 6 hours PRN For Temp greater than 38 C (100.4 F)  acetaminophen   Tablet. 650 milliGRAM(s) Oral every 6 hours PRN Mild Pain (1 - 3)  dextrose Gel 1 Dose(s) Oral once PRN Blood Glucose LESS THAN 70 milliGRAM(s)/deciliter  glucagon  Injectable 1 milliGRAM(s) IntraMuscular once PRN Glucose LESS THAN 70 milligrams/deciliter  ondansetron Injectable 4 milliGRAM(s) IV Push every 6 hours PRN Nausea and/or Vomiting      ALLERGIES:  Allergies    Allergy Status Unknown    Intolerances        LABS:                        14.0   6.0   )-----------( 223      ( 08 Apr 2018 06:17 )             42.5     04-08    138  |  100  |  12  ----------------------------<  97  3.8   |  26  |  1.39<H>    Ca    9.2      08 Apr 2018 06:17  Phos  3.3     04-08  Mg     1.7     04-08      PT/INR - ( 08 Apr 2018 15:17 )   PT: 12.6 sec;   INR: 1.13          PTT - ( 08 Apr 2018 15:17 )  PTT:30.9 sec    CAPILLARY BLOOD GLUCOSE      POCT Blood Glucose.: 91 mg/dL (09 Apr 2018 07:02)      RADIOLOGY & ADDITIONAL TESTS: Reviewed.

## 2018-04-09 NOTE — PROGRESS NOTE ADULT - PROBLEM SELECTOR PLAN 5
F: None  E: replete PRN for K<4, Mg<2  N: NPO except meds F: None  E: replete PRN for K<4, Mg<2  N: DASH/TLC diet, NPO for BRANDON

## 2018-04-09 NOTE — PROGRESS NOTE ADULT - ATTENDING COMMENTS
BRANDON reviewed by me shows large left atrial appendage thrombus. Case discussed cardiology and cardiothoracic surgery. No surgical options at thios point. Will switch AC to IV heparin and keep patient in the stroke unit due to the high risk of embolization. keep PTT 40-60. dc lovenox and coumadin

## 2018-04-09 NOTE — PROGRESS NOTE ADULT - SUBJECTIVE AND OBJECTIVE BOX
Physical Medicine and Rehabilitation Progress Note:    Patient is a 52y old  Male who presents with a chief complaint of CVA (07 Apr 2018 16:49)      HPI:  52 year old ambidextrous (right  hand preference) who has been under the care of cardiologist in [facility] (Dr. Ding) for "heart palpitations, not on any medications.  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  Upon arrival patient noted to be aphasic, right facial droop, right hemiplegia, and left gaze preference.  A small hematoma note above his right eye.  CTA left M2 occlusion taken to angio suite for mechanical thrombectomy. (05 Apr 2018 10:00)                            15.0   6.8   )-----------( 270      ( 09 Apr 2018 08:16 )             45.0       04-09    133<L>  |  97  |  14  ----------------------------<  102<H>  4.2   |  25  |  1.41<H>    Ca    9.8      09 Apr 2018 08:16  Phos  3.3     04-08  Mg     1.6     04-09      Vital Signs Last 24 Hrs  T(C): 37 (09 Apr 2018 14:12), Max: 37 (08 Apr 2018 17:00)  T(F): 98.6 (09 Apr 2018 14:12), Max: 98.6 (08 Apr 2018 17:00)  HR: 60 (09 Apr 2018 13:35) (54 - 60)  BP: 146/89 (09 Apr 2018 13:35) (127/79 - 158/88)  BP(mean): 111 (09 Apr 2018 13:35) (99 - 113)  RR: 18 (09 Apr 2018 13:35) (16 - 20)  SpO2: 94% (09 Apr 2018 13:35) (94% - 98%)    MEDICATIONS  (STANDING):  atorvastatin 80 milliGRAM(s) Oral at bedtime  dextrose 5%. 1000 milliLiter(s) (50 mL/Hr) IV Continuous <Continuous>  dextrose 50% Injectable 12.5 Gram(s) IV Push once  docusate sodium 100 milliGRAM(s) Oral three times a day  enoxaparin Injectable 90 milliGRAM(s) SubCutaneous two times a day  escitalopram 5 milliGRAM(s) Oral daily  insulin lispro (HumaLOG) corrective regimen sliding scale   SubCutaneous Before meals and at bedtime  senna 2 Tablet(s) Oral at bedtime    MEDICATIONS  (PRN):  acetaminophen   Tablet 650 milliGRAM(s) Oral every 6 hours PRN For Temp greater than 38 C (100.4 F)  acetaminophen   Tablet. 650 milliGRAM(s) Oral every 6 hours PRN Mild Pain (1 - 3)  dextrose Gel 1 Dose(s) Oral once PRN Blood Glucose LESS THAN 70 milliGRAM(s)/deciliter  glucagon  Injectable 1 milliGRAM(s) IntraMuscular once PRN Glucose LESS THAN 70 milligrams/deciliter  ondansetron Injectable 4 milliGRAM(s) IV Push every 6 hours PRN Nausea and/or Vomiting    Currently Undergoing Physical Therapy at bedside.    Initial Functional Status Assessment:    Previous Level of Function:     · Ambulation Skills	independent	  · Transfer Skills	independent	  · ADL Skills	independent	  · Work/Leisure Activity	independent	  · Additional Comments	Pt lives with family in an elevator building. Prior to admission, pt ambulated independently without assistive device.	    Cognitive Status Examination:   · Orientation	oriented to person, place, time and situation; expressive aphasia	  · Level of Consciousness	alert	  · Follows Commands and Answers Questions	100% of the time; 100% with simple command,60% with multi steps command	  · Personal Safety and Judgment	intact	    Range of Motion Exam:   · Active Range of Motion Examination	no Active ROM deficits were identified; except right UE PROM WFL through out	    Manual Muscle Testing:   · Manual Muscle Testing Results	LUE~4+/5, RLE~0/5 b/l LEs~4+/5	    Muscle Tone Assessment:   · Muscle Tone Assessment	Right UE; mildly decreased tone	    Bed Mobility: Scooting/Bridging:     · Level of Loving	contact guard; minimum assist (75% patients effort)	  · Physical Assist/Nonphysical Assist	1 person assist	    Bed Mobility: Sit to Supine:     · Level of Loving	contact guard; minimum assist (75% patients effort)	  · Physical Assist/Nonphysical Assist	1 person assist	    Bed Mobility: Supine to Sit:     · Level of Loving	minimum assist (75% patients effort)	  · Physical Assist/Nonphysical Assist	1 person assist	    Bed Mobility Analysis:     · Bed Mobility Limitations	decreased ability to use arms for pushing/pulling	  · Impairments Contributing to Impaired Bed Mobility	impaired balance; decreased strength	    Transfer: Sit to Stand:     · Level of Loving	minimum assist (75% patients effort); moderate assist (50% patients effort)	  · Physical Assist/Nonphysical Assist	2 person assist	  · Weight-Bearing Restrictions	weight-bearing as tolerated	  · Assistive Device	b/l axillary support	    Transfer: Stand to Sit:     · Level of Loving	minimum assist (75% patients effort); moderate assist (50% patients effort)	  · Physical Assist/Nonphysical Assist	2 person assist	  · Weight-Bearing Restrictions	weight-bearing as tolerated	  · Assistive Device	b/l axillary support	    Sit/Stand Transfer Safety Analysis:     · Impairments Contributing to Impaired Transfers	impaired balance; decreased strength	    Gait Skills:     · Level of Loving	minimum assist (75% patients effort); moderate assist (50% patients effort)	  · Physical Assist/Nonphysical Assist	2 person assist	  · Weight-Bearing Restrictions	weight-bearing as tolerated	  · Assistive Device	b/l axillary support	  · Gait Distance	8 side steps, 6 steps forward/backward x 2	    Gait Analysis:     · Gait Pattern Used	swing-to gait	  · Gait Deviations Noted	decreased fannie; decreased step length; slightly increased lateral sway, no lose of balance, fairly steady,	  · Impairments Contributing to Gait Deviations	impaired balance	    Stair Negotiation:     · Level of Loving	unable to perform	    Balance Skills Assessment:     · Sitting Balance: Static	good balance	  · Sitting Balance: Dynamic	fair plus	  · Sit-to-Stand Balance	fair balance	  · Standing Balance: Static	fair balance	  · Standing Balance: Dynamic	fair minus	    Sensory Examination:   Sensory Examination:    Light Touch Sensation:   · Left UE	within normal limits	  · Right UE	decreased sensation	  · Left LE	within normal limits	  · Right LE	within normal limits	      Treatment Location:   · Comments	+right facial droop, no vision deficit, able to track cross midline.	    Clinical Impressions:   · Criteria for Skilled Therapeutic Interventions	impairments found; functional limitations in following categories; rehab potential; therapy frequency; anticipated discharge recommendation	  · Impairments Found (describe specific impairments)	muscle strength; fine motor; gait, locomotion, and balance; gross motor	  · Functional Limitations in Following Categories (describe specific limitations)	self-care; home management; work	  · Rehab Potential	good, to achieve stated therapy goals	  · Therapy Frequency	3-5x/week	        Reassessment on 4/8/2018    Therapeutic Interventions      Bed Mobility  Bed Mobility Training Rolling/Turning: minimum assist (75% patient effort);  verbal cues;  1 person assist  Bed Mobility Training Scooting: minimum assist (75% patient effort);  verbal cues;  1 person assist  Bed Mobility Training Supine-to-Sit: minimum assist (75% patient effort);  verbal cues;  1 person assist;  bed rails  Bed Mobility Training Limitations: decreased ability to use arms for pushing/pulling;  abnormal muscle tone;  decreased ROM;  decreased strength;  impaired balance;  impaired coordination;  impaired motor control;  impaired postural control;  RUE neglect    Sit-Stand Transfer Training  Transfer Training Sit-to-Stand Transfer: contact guard;  verbal cues;  1 person assist;  weight-bearing as tolerated  Transfer Training Stand-to-Sit Transfer: contact guard;  verbal cues;  1 person assist;  weight-bearing as tolerated  Sit-to-Stand Transfer Training Transfer Safety Analysis: decreased balance;  decreased step length;  abnormal muscle tone;  decreased ROM;  decreased strength;  impaired balance;  impaired coordination;  impaired motor control;  impaired postural control    Gait Training  Gait Training: contact guard;  verbal cues;  1 person assist;  weight-bearing as tolerated   portable monitor, RUE in sling;  75 feet  Gait Analysis: swing-through gait   decreased fannie;  shuffling;  decreased step length;  decreased stride length;  decreased toe clearance;  decreased toe-to-floor clearance;  narrow VERONICA, right Lower Extremity toeing in close to midline;  abnormal muscle tone;  decreased ROM;  decreased strength;  impaired coordination;  impaired balance;  impaired motor control;  impaired postural control  Gait Number of Times:: x 2  Type of Rest Type of Rest: standing    Therapeutic Exercise  Therapeutic Exercise Detail: MMTs (seated): LEs: hip flexion, knee flex/ext, DF/PF all 4/5UEs: shoulder flex/ext, elbow flex/ext, wrist flex/ext, supination/pronation, finger flex/ext/abd/add - RUE 0/5, LUE 4/5         PM&R Impression: as above    Disposition Plan Recommendations: acute rehab placement

## 2018-04-09 NOTE — SPEECH LANGUAGE PATHOLOGY EVALUATION - SLP AUTOMATIC SPEECH
days of the week/occ phonemic cueing to produce words/intact/spontaneous greetings/counting/months of the year

## 2018-04-09 NOTE — PROGRESS NOTE ADULT - PROBLEM SELECTOR PLAN 1
Pt with stroke code at [facility], where CT head showed left M2 occlusion with R-sided deficits (hemiplegia, R facial droop), CTA showing same L M2 occlusion, now s/p thrombectomy. Heme/onc on board to evaluate coagulopathy.  - c/w Lipitor 80mg  - c/w aspirin 300mg rectal daily  - A1c 5.1, cholesterol 210, HDL 65, LDL 90,   - permissive HTN -160s  - neuro checks q4h for hemorrhagic conversion  - Heme/onc Dr. Rodriguez following, f/u recs (+anticardiolipin Ab): Started on Lovenox 90mg BID and bridged to coumadin 3mg tonight  - US lower extremity dopplers neg for DVT  - TTE (4/6): EF 60%, trace TR, MR, MN; no interatrial shunt  - pending BRANDON  - Lexapro 5mg daily for motor recovery Pt with stroke code at [facility], where CT head showed left M2 occlusion with R-sided deficits (hemiplegia, R facial droop), CTA showing same L M2 occlusion, now s/p thrombectomy. Heme/onc on board to evaluate coagulopathy.  - c/w Lipitor 80mg  - c/w aspirin 300mg rectal daily  - A1c 5.1, cholesterol 210, HDL 65, LDL 90,   - permissive HTN -160s  - neuro checks q4h for hemorrhagic conversion  - Heme/onc Dr. Rodriguez following, f/u recs (+anticardiolipin Ab): Started on Lovenox 90mg BID and bridged to coumadin 3mg tonight  - US lower extremity dopplers neg for DVT  - TTE (4/6): EF 60%, trace TR, MR, MN; no interatrial shunt  - pending BRANDON  - Lexapro 5mg daily for motor recovery  - Aricept 5mg PO daily at bedtime for neurosignaling s/p stroke

## 2018-04-09 NOTE — PROGRESS NOTE ADULT - SUBJECTIVE AND OBJECTIVE BOX
HEALTH ISSUES - PROBLEM Dx:  Anticardiolipin antibody positive: Anticardiolipin antibody positive  Prophylactic measure: Prophylactic measure  Nutrition, metabolism, and development symptoms: Nutrition, metabolism, and development symptoms  MARIELOS (acute kidney injury): MARIELOS (acute kidney injury)  Palpitations: Palpitations  Cerebrovascular accident (CVA) due to occlusion of left middle cerebral artery: Cerebrovascular accident (CVA) due to occlusion of left middle cerebral artery          CHEMOTHERAPY REGIMEN:        Day:                          Diet:  Protocol:                                    IVF:      MEDICATIONS  (STANDING):  atorvastatin 80 milliGRAM(s) Oral at bedtime  docusate sodium 100 milliGRAM(s) Oral three times a day  donepezil 5 milliGRAM(s) Oral at bedtime  heparin  Infusion 1700 Unit(s)/Hr (17 mL/Hr) IV Continuous <Continuous>  senna 2 Tablet(s) Oral at bedtime    MEDICATIONS  (PRN):  acetaminophen   Tablet 650 milliGRAM(s) Oral every 6 hours PRN For Temp greater than 38 C (100.4 F)  acetaminophen   Tablet. 650 milliGRAM(s) Oral every 6 hours PRN Mild Pain (1 - 3)  ondansetron Injectable 4 milliGRAM(s) IV Push every 6 hours PRN Nausea and/or Vomiting      Allergies    Allergy Status Unknown    Intolerances        DVT Prophylaxis: [ ] YES [ ] NO      Antibiotics: [ ] YES [ ] NO    Pain Scale (1-10):       Location:    Vital Signs Last 24 Hrs  T(C): 36.7 (09 Apr 2018 22:00), Max: 37 (09 Apr 2018 14:12)  T(F): 98.1 (09 Apr 2018 22:00), Max: 98.6 (09 Apr 2018 14:12)  HR: 56 (09 Apr 2018 20:23) (54 - 60)  BP: 138/88 (09 Apr 2018 20:23) (127/79 - 158/88)  BP(mean): 108 (09 Apr 2018 20:23) (99 - 113)  RR: 17 (09 Apr 2018 20:23) (16 - 18)  SpO2: 96% (09 Apr 2018 20:23) (94% - 97%)    Drug Dosing Weight  Height (cm): 180.34 (05 Apr 2018 09:00)  Weight (kg): 92.3 (05 Apr 2018 09:00)  BMI (kg/m2): 28.4 (05 Apr 2018 09:00)  BSA (m2): 2.12 (05 Apr 2018 09:00)     Physical Exam:  · Constitutional	detailed exam  · Constitutional Details	well-developed; well-groomed  · Eyes	EOMI; PERRL; no drainage or redness  · ENMT Comments	dry mucous membranes  · Respiratory	detailed exam  · Respiratory Comments	normal breath sounds at the lung bases bilaterally  · Cardiovascular	Regular rate & rhythm, normal S1, S2; no murmurs, gallops or rubs; no S3, S4  · Abd-Soft non tender  ·Ext-no edema, clubbing or cyanosis    URINARY CATHETER: [ ] YES [ ] NO     LABS:  CBC Full  -  ( 09 Apr 2018 08:16 )  WBC Count : 6.8 K/uL  Hemoglobin : 15.0 g/dL  Hematocrit : 45.0 %  Platelet Count - Automated : 270 K/uL  Mean Cell Volume : 85.2 fL  Mean Cell Hemoglobin : 28.4 pg  Mean Cell Hemoglobin Concentration : 33.3 g/dL  Auto Neutrophil # : x  Auto Lymphocyte # : x  Auto Monocyte # : x  Auto Eosinophil # : x  Auto Basophil # : x  Auto Neutrophil % : x  Auto Lymphocyte % : x  Auto Monocyte % : x  Auto Eosinophil % : x  Auto Basophil % : x    04-09    133<L>  |  97  |  14  ----------------------------<  102<H>  4.2   |  25  |  1.41<H>    Ca    9.8      09 Apr 2018 08:16  Phos  3.3     04-08  Mg     1.6     04-09      PT/INR - ( 09 Apr 2018 08:16 )   PT: 12.9 sec;   INR: 1.16          PTT - ( 09 Apr 2018 20:30 )  PTT:101.8 sec      CULTURES:    RADIOLOGY & ADDITIONAL STUDIES:

## 2018-04-09 NOTE — PROGRESS NOTE ADULT - PROBLEM SELECTOR PLAN 4
Pt with unknown Cr baseline, however with Cr 1.38 on admission. Likely 2/2 IV contrast  - continue to trend BMP  - off IVFs Pt with unknown Cr baseline, however with Cr 1.38 on admission. Likely 2/2 IV contrast, stable around admission Cr.  - continue to trend BMP  - off IVFs

## 2018-04-09 NOTE — PROGRESS NOTE ADULT - PROBLEM SELECTOR PLAN 2
- heme/onc Dr. Rodriguez consulted, f/u recs  - c/w Lovenox 90mg BID  - s/p Coumadin 3mg x1 yesterday

## 2018-04-09 NOTE — SPEECH LANGUAGE PATHOLOGY EVALUATION - COMMENTS
Pt would benefit from cognitive linguistic therapy. To improve functional communication, consider the following goals:    1. Pt will formulate (verbally) grammatically correct sentences (5+ words) to express needs/wants/describe w/ 80% accuracy and min assist   2. Pt will respond to semi complex yes/no questions and follow 2-3 step commands w/ 80% accuracy and min assist   3. Pt will identify at least 6-8 items in concrete/abstract categories w/ 80% accuracy and min assist   4. Pt will produce multisyllabic words in phrases via compensatory speech strategies (ie syllabification) 80% of the time w/ min assist  5. Pt will orthographically produce functional words/phrases w/ 80% accuracy and min assist mild auditory comprehension deficits moderate expressive deficits Pt would benefit from additional cognitive testing Pt able to write name when presented w/ the first letter and space markers (K _ _ _) verbal apraxia WNL for age/gender at this time

## 2018-04-09 NOTE — PROGRESS NOTE ADULT - ASSESSMENT
53yo M with no PMH presenting with acute left M2 occlusion from [facility] (s/p tPA), now s/p thrombectomy, pending BRANDON.    Problem/Plan - 1:  ·  Problem: Cerebrovascular accident (CVA) due to occlusion of left middle cerebral artery.  Plan: Pt with stroke code at [facility], where CT head showed left M2 occlusion with R-sided deficits (hemiplegia, R facial droop), CTA showing same L M2 occlusion, now s/p thrombectomy. Heme/onc on board to evaluate coagulopathy.  - c/w Lipitor 80mg  - c/w aspirin 300mg rectal daily  - A1c 5.1, cholesterol 210, HDL 65, LDL 90,   - permissive HTN -160s  - neuro checks q4h for hemorrhagic conversion  - Heme/onc Dr. Rodriguez following, f/u recs (+anticardiolipin Ab): Started on Lovenox 90mg BID and bridged to coumadin 3mg tonight  - US lower extremity dopplers neg for DVT  - TTE (4/6): EF 60%, trace TR, MR, SD; no interatrial shunt  - pending BRANDON  - Lexapro 5mg daily for motor recovery.     Problem/Plan - 2:  ·  Problem: Anticardiolipin antibody positive.  Plan: - heme/onc Dr. Rodriguez consulted, f/u recs  - c/w Lovenox 90mg BID  - s/p Coumadin 3mg x1 yesterday.     Problem/Plan - 3:  ·  Problem: Palpitations.  Plan: Pt was following with Dr. Ding (cardiology) outpatient for palpitations  - f/u EKG.     Problem/Plan - 4:  ·  Problem: MARIELOS (acute kidney injury).  Plan: Pt with unknown Cr baseline, however with Cr 1.38 on admission. Likely 2/2 IV contrast  - continue to trend BMP  - off IVFs.

## 2018-04-10 DIAGNOSIS — I51.9 HEART DISEASE, UNSPECIFIED: ICD-10-CM

## 2018-04-10 LAB
ANION GAP SERPL CALC-SCNC: 11 MMOL/L — SIGNIFICANT CHANGE UP (ref 5–17)
APTT BLD: 139.7 SEC — CRITICAL HIGH (ref 27.5–37.4)
APTT BLD: 63.7 SEC — HIGH (ref 27.5–37.4)
APTT BLD: 64.4 SEC — HIGH (ref 27.5–37.4)
APTT BLD: 68.7 SEC — HIGH (ref 27.5–37.4)
BUN SERPL-MCNC: 14 MG/DL — SIGNIFICANT CHANGE UP (ref 7–23)
CALCIUM SERPL-MCNC: 9 MG/DL — SIGNIFICANT CHANGE UP (ref 8.4–10.5)
CHLORIDE SERPL-SCNC: 96 MMOL/L — SIGNIFICANT CHANGE UP (ref 96–108)
CO2 SERPL-SCNC: 25 MMOL/L — SIGNIFICANT CHANGE UP (ref 22–31)
CREAT SERPL-MCNC: 1.3 MG/DL — SIGNIFICANT CHANGE UP (ref 0.5–1.3)
DNA PLOIDY SPEC FC-IMP: SIGNIFICANT CHANGE UP
GLUCOSE SERPL-MCNC: 103 MG/DL — HIGH (ref 70–99)
HCT VFR BLD CALC: 41.5 % — SIGNIFICANT CHANGE UP (ref 39–50)
HGB BLD-MCNC: 14 G/DL — SIGNIFICANT CHANGE UP (ref 13–17)
INR BLD: 1.19 — HIGH (ref 0.88–1.16)
MAGNESIUM SERPL-MCNC: 1.7 MG/DL — SIGNIFICANT CHANGE UP (ref 1.6–2.6)
MCHC RBC-ENTMCNC: 28.6 PG — SIGNIFICANT CHANGE UP (ref 27–34)
MCHC RBC-ENTMCNC: 33.7 G/DL — SIGNIFICANT CHANGE UP (ref 32–36)
MCV RBC AUTO: 84.9 FL — SIGNIFICANT CHANGE UP (ref 80–100)
MTHFR GENE INTERPRETATION: SIGNIFICANT CHANGE UP
PLATELET # BLD AUTO: 266 K/UL — SIGNIFICANT CHANGE UP (ref 150–400)
POTASSIUM SERPL-MCNC: 4 MMOL/L — SIGNIFICANT CHANGE UP (ref 3.5–5.3)
POTASSIUM SERPL-SCNC: 4 MMOL/L — SIGNIFICANT CHANGE UP (ref 3.5–5.3)
PROTHROM AB SERPL-ACNC: 13.2 SEC — HIGH (ref 9.8–12.7)
PTR INTERPRETATION: SIGNIFICANT CHANGE UP
RBC # BLD: 4.89 M/UL — SIGNIFICANT CHANGE UP (ref 4.2–5.8)
RBC # FLD: 13.1 % — SIGNIFICANT CHANGE UP (ref 10.3–16.9)
SODIUM SERPL-SCNC: 132 MMOL/L — LOW (ref 135–145)
WBC # BLD: 5.4 K/UL — SIGNIFICANT CHANGE UP (ref 3.8–10.5)
WBC # FLD AUTO: 5.4 K/UL — SIGNIFICANT CHANGE UP (ref 3.8–10.5)

## 2018-04-10 PROCEDURE — 99231 SBSQ HOSP IP/OBS SF/LOW 25: CPT

## 2018-04-10 PROCEDURE — 99232 SBSQ HOSP IP/OBS MODERATE 35: CPT

## 2018-04-10 PROCEDURE — 99233 SBSQ HOSP IP/OBS HIGH 50: CPT

## 2018-04-10 RX ORDER — HEPARIN SODIUM 5000 [USP'U]/ML
1100 INJECTION INTRAVENOUS; SUBCUTANEOUS
Qty: 25000 | Refills: 0 | Status: DISCONTINUED | OUTPATIENT
Start: 2018-04-10 | End: 2018-04-12

## 2018-04-10 RX ORDER — CLONAZEPAM 1 MG
0.5 TABLET ORAL AT BEDTIME
Qty: 0 | Refills: 0 | Status: DISCONTINUED | OUTPATIENT
Start: 2018-04-10 | End: 2018-04-16

## 2018-04-10 RX ORDER — MAGNESIUM SULFATE 500 MG/ML
1 VIAL (ML) INJECTION ONCE
Qty: 0 | Refills: 0 | Status: COMPLETED | OUTPATIENT
Start: 2018-04-10 | End: 2018-04-10

## 2018-04-10 RX ORDER — HALOPERIDOL DECANOATE 100 MG/ML
0.5 INJECTION INTRAMUSCULAR ONCE
Qty: 0 | Refills: 0 | Status: DISCONTINUED | OUTPATIENT
Start: 2018-04-10 | End: 2018-04-10

## 2018-04-10 RX ADMIN — Medication 100 GRAM(S): at 06:34

## 2018-04-10 RX ADMIN — Medication 81 MILLIGRAM(S): at 11:28

## 2018-04-10 RX ADMIN — Medication 100 MILLIGRAM(S): at 06:35

## 2018-04-10 RX ADMIN — HEPARIN SODIUM 11 UNIT(S)/HR: 5000 INJECTION INTRAVENOUS; SUBCUTANEOUS at 10:31

## 2018-04-10 RX ADMIN — ESCITALOPRAM OXALATE 5 MILLIGRAM(S): 10 TABLET, FILM COATED ORAL at 11:28

## 2018-04-10 RX ADMIN — DONEPEZIL HYDROCHLORIDE 5 MILLIGRAM(S): 10 TABLET, FILM COATED ORAL at 22:05

## 2018-04-10 RX ADMIN — ATORVASTATIN CALCIUM 80 MILLIGRAM(S): 80 TABLET, FILM COATED ORAL at 22:05

## 2018-04-10 NOTE — PROGRESS NOTE ADULT - SUBJECTIVE AND OBJECTIVE BOX
CC: Patient is a 52y old  Male who presents with a chief complaint of CVA (07 Apr 2018 16:49)    OVERNIGHT EVENTS: NAEO    SUBJECTIVE / INTERVAL HPI: Patient seen and examined at bedside.     VITAL SIGNS:  Vital Signs Last 24 Hrs  T(C): 36.6 (10 Apr 2018 09:00), Max: 37 (09 Apr 2018 14:12)  T(F): 97.9 (10 Apr 2018 09:00), Max: 98.6 (09 Apr 2018 14:12)  HR: 62 (10 Apr 2018 11:31) (52 - 62)  BP: 138/79 (10 Apr 2018 11:31) (134/80 - 146/89)  BP(mean): 101 (10 Apr 2018 11:31) (93 - 111)  RR: 12 (10 Apr 2018 11:31) (12 - 18)  SpO2: 95% (10 Apr 2018 11:31) (93% - 97%)    PHYSICAL EXAM:    General: lying in bed, appears comfortable, in NAD  HEENT: EOMI, anicteric, R facial droop, able to move bilateral eyebrows R>L  Neck: supple, no LAD  Cardiovascular: +S1/S2; RRR  Respiratory: CTA B/L; no W/R/R  Gastrointestinal: soft, NT/ND; +BSx4  Extremities: WWP; no edema, clubbing or cyanosis  Vascular: 2+ radial, DP/PT pulses B/L  Neurological: AOx3; improved speech, R facial droop, able to move bilateral eyebrows R>L, 5/5 strength LUE and LLE, 0/5 RUE unable to lift right arm, 4/5 RLE strength    MEDICATIONS:  MEDICATIONS  (STANDING):  aspirin  chewable 81 milliGRAM(s) Oral daily  atorvastatin 80 milliGRAM(s) Oral at bedtime  docusate sodium 100 milliGRAM(s) Oral three times a day  donepezil 5 milliGRAM(s) Oral at bedtime  escitalopram 5 milliGRAM(s) Oral daily  heparin  Infusion 1100 Unit(s)/Hr (11 mL/Hr) IV Continuous <Continuous>  senna 2 Tablet(s) Oral at bedtime    MEDICATIONS  (PRN):  acetaminophen   Tablet 650 milliGRAM(s) Oral every 6 hours PRN For Temp greater than 38 C (100.4 F)  acetaminophen   Tablet. 650 milliGRAM(s) Oral every 6 hours PRN Mild Pain (1 - 3)  clonazePAM Tablet 0.5 milliGRAM(s) Oral at bedtime PRN insomnia  ondansetron Injectable 4 milliGRAM(s) IV Push every 6 hours PRN Nausea and/or Vomiting      ALLERGIES:  Allergies    Allergy Status Unknown    Intolerances        LABS:                        14.0   5.4   )-----------( 266      ( 10 Apr 2018 03:28 )             41.5     04-10    132<L>  |  96  |  14  ----------------------------<  103<H>  4.0   |  25  |  1.30    Ca    9.0      10 Apr 2018 03:28  Mg     1.7     04-10      PT/INR - ( 10 Apr 2018 03:28 )   PT: 13.2 sec;   INR: 1.19          PTT - ( 10 Apr 2018 11:15 )  PTT:68.7 sec    CAPILLARY BLOOD GLUCOSE      POCT Blood Glucose.: 87 mg/dL (10 Apr 2018 06:53)      RADIOLOGY & ADDITIONAL TESTS: Reviewed.

## 2018-04-10 NOTE — PROGRESS NOTE ADULT - SUBJECTIVE AND OBJECTIVE BOX
HEALTH ISSUES - PROBLEM Dx:  Atrial mass: Atrial mass  Anticardiolipin antibody positive: Anticardiolipin antibody positive  Prophylactic measure: Prophylactic measure  Nutrition, metabolism, and development symptoms: Nutrition, metabolism, and development symptoms  MARIELOS (acute kidney injury): MARIELOS (acute kidney injury)  Palpitations: Palpitations  Cerebrovascular accident (CVA) due to occlusion of left middle cerebral artery: Cerebrovascular accident (CVA) due to occlusion of left middle cerebral artery          CHEMOTHERAPY REGIMEN:        Day:                          Diet:  Protocol:                                    IVF:      MEDICATIONS  (STANDING):  aspirin  chewable 81 milliGRAM(s) Oral daily  atorvastatin 80 milliGRAM(s) Oral at bedtime  docusate sodium 100 milliGRAM(s) Oral three times a day  donepezil 5 milliGRAM(s) Oral at bedtime  escitalopram 5 milliGRAM(s) Oral daily  heparin  Infusion 1100 Unit(s)/Hr (11 mL/Hr) IV Continuous <Continuous>  senna 2 Tablet(s) Oral at bedtime    MEDICATIONS  (PRN):  acetaminophen   Tablet 650 milliGRAM(s) Oral every 6 hours PRN For Temp greater than 38 C (100.4 F)  acetaminophen   Tablet. 650 milliGRAM(s) Oral every 6 hours PRN Mild Pain (1 - 3)  clonazePAM Tablet 0.5 milliGRAM(s) Oral at bedtime PRN insomnia  ondansetron Injectable 4 milliGRAM(s) IV Push every 6 hours PRN Nausea and/or Vomiting      Allergies    Allergy Status Unknown    Intolerances        DVT Prophylaxis: [ ] YES [ ] NO      Antibiotics: [ ] YES [ ] NO    Pain Scale (1-10):       Location:    Vital Signs Last 24 Hrs  T(C): 36.7 (10 Apr 2018 22:00), Max: 36.9 (10 Apr 2018 17:00)  T(F): 98.1 (10 Apr 2018 22:00), Max: 98.5 (10 Apr 2018 17:00)  HR: 58 (10 Apr 2018 20:33) (52 - 62)  BP: 131/81 (10 Apr 2018 20:33) (131/81 - 150/84)  BP(mean): 100 (10 Apr 2018 20:33) (93 - 109)  RR: 15 (10 Apr 2018 20:33) (12 - 18)  SpO2: 96% (10 Apr 2018 20:33) (93% - 97%)    Drug Dosing Weight  Height (cm): 180.34 (05 Apr 2018 09:00)  Weight (kg): 92.3 (05 Apr 2018 09:00)  BMI (kg/m2): 28.4 (05 Apr 2018 09:00)  BSA (m2): 2.12 (05 Apr 2018 09:00)     Physical Exam:  · Constitutional	detailed exam  · Constitutional Details	well-developed; well-groomed  · Eyes	EOMI; PERRL; no drainage or redness  · ENMT Comments	dry mucous membranes  · Respiratory	detailed exam  · Respiratory Comments	normal breath sounds at the lung bases bilaterally  · Cardiovascular	Regular rate & rhythm, normal S1, S2; no murmurs, gallops or rubs; no S3, S4  · Abd-Soft non tender  ·Ext-no edema, clubbing or cyanosis    URINARY CATHETER: [ ] YES [ ] NO     LABS:  CBC Full  -  ( 10 Apr 2018 03:28 )  WBC Count : 5.4 K/uL  Hemoglobin : 14.0 g/dL  Hematocrit : 41.5 %  Platelet Count - Automated : 266 K/uL  Mean Cell Volume : 84.9 fL  Mean Cell Hemoglobin : 28.6 pg  Mean Cell Hemoglobin Concentration : 33.7 g/dL  Auto Neutrophil # : x  Auto Lymphocyte # : x  Auto Monocyte # : x  Auto Eosinophil # : x  Auto Basophil # : x  Auto Neutrophil % : x  Auto Lymphocyte % : x  Auto Monocyte % : x  Auto Eosinophil % : x  Auto Basophil % : x    04-10    132<L>  |  96  |  14  ----------------------------<  103<H>  4.0   |  25  |  1.30    Ca    9.0      10 Apr 2018 03:28  Mg     1.7     04-10      PT/INR - ( 10 Apr 2018 03:28 )   PT: 13.2 sec;   INR: 1.19          PTT - ( 10 Apr 2018 22:48 )  PTT:63.7 sec      CULTURES:    RADIOLOGY & ADDITIONAL STUDIES:

## 2018-04-10 NOTE — PROGRESS NOTE ADULT - ASSESSMENT
Found to have lt atrial thrombus by BRANDON and is on full AC now. Hypercoagulable w/u negative (Factor V Leiden, Prothrombin Gene mutation), and pt is heterozygous MTHFR gene mutated.

## 2018-04-10 NOTE — PROGRESS NOTE ADULT - PROBLEM SELECTOR PLAN 4
Pt was following with Dr. Ding (cardiology) outpatient for palpitations  - EKG showed sinus arrhythmia with 1st degree AV block

## 2018-04-10 NOTE — PROGRESS NOTE ADULT - ASSESSMENT
Left MCA occlusion status post tPA and mechanical thrombectomy diagnosed with anticardiolipin Ab and left atrial mass    Anticoagulation with heparin  PT/OT  Speech  Care as per vascular neurology, hematology and cardiology

## 2018-04-10 NOTE — PROGRESS NOTE ADULT - PROBLEM SELECTOR PLAN 3
BRANDON (4/9) showed large L atrial mass, now on heparin gtt  - cardiology consulted, f/u recs  - CT surgery consulted, f/u recs

## 2018-04-10 NOTE — PROGRESS NOTE ADULT - ASSESSMENT
53yo M with no PMH presenting with acute left M2 occlusion from [facility] (s/p tPA), now s/p thrombectomy, s/p BRANDON showing large L atrial appendage mass, on heparin gtt.
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
## 2018-04-10 NOTE — PROGRESS NOTE ADULT - PROBLEM SELECTOR PLAN 1
Pt with stroke code at [facility], where CT head showed left M2 occlusion with R-sided deficits (hemiplegia, R facial droop), CTA showing same L M2 occlusion, now s/p thrombectomy. Heme/onc on board to evaluate coagulopathy.  - c/w Lipitor 80mg  - c/w aspirin 300mg rectal daily  - A1c 5.1, cholesterol 210, HDL 65, LDL 90,   - permissive HTN -160s  - neuro checks q4h for hemorrhagic conversion  - Heme/onc Dr. Rodriguez following, f/u recs (+anticardiolipin Ab): off Lovenox and Coumadin  - US lower extremity dopplers neg for DVT  - TTE (4/6): EF 60%, trace TR, MR, PA; no interatrial shunt  - BRANDON showed 2cm L atrial appendage mass, on heparin gtt  - Lexapro 5mg daily for motor recovery  - Aricept 5mg PO daily at bedtime for neurosignaling s/p stroke

## 2018-04-10 NOTE — PROGRESS NOTE ADULT - PROBLEM SELECTOR PLAN 5
Pt with unknown Cr baseline, however with Cr 1.38 on admission. Likely 2/2 IV contrast, stable around admission Cr.  - continue to trend BMP  - off IVFs

## 2018-04-11 LAB
ANION GAP SERPL CALC-SCNC: 11 MMOL/L — SIGNIFICANT CHANGE UP (ref 5–17)
APTT BLD: 56.7 SEC — HIGH (ref 27.5–37.4)
APTT BLD: 57.5 SEC — HIGH (ref 27.5–37.4)
BUN SERPL-MCNC: 14 MG/DL — SIGNIFICANT CHANGE UP (ref 7–23)
CALCIUM SERPL-MCNC: 9.5 MG/DL — SIGNIFICANT CHANGE UP (ref 8.4–10.5)
CHLORIDE SERPL-SCNC: 99 MMOL/L — SIGNIFICANT CHANGE UP (ref 96–108)
CO2 SERPL-SCNC: 26 MMOL/L — SIGNIFICANT CHANGE UP (ref 22–31)
CREAT SERPL-MCNC: 1.42 MG/DL — HIGH (ref 0.5–1.3)
GLUCOSE SERPL-MCNC: 101 MG/DL — HIGH (ref 70–99)
HCT VFR BLD CALC: 44.4 % — SIGNIFICANT CHANGE UP (ref 39–50)
HGB BLD-MCNC: 14.6 G/DL — SIGNIFICANT CHANGE UP (ref 13–17)
MAGNESIUM SERPL-MCNC: 1.7 MG/DL — SIGNIFICANT CHANGE UP (ref 1.6–2.6)
MCHC RBC-ENTMCNC: 28 PG — SIGNIFICANT CHANGE UP (ref 27–34)
MCHC RBC-ENTMCNC: 32.9 G/DL — SIGNIFICANT CHANGE UP (ref 32–36)
MCV RBC AUTO: 85.2 FL — SIGNIFICANT CHANGE UP (ref 80–100)
PLATELET # BLD AUTO: 290 K/UL — SIGNIFICANT CHANGE UP (ref 150–400)
POTASSIUM SERPL-MCNC: 4 MMOL/L — SIGNIFICANT CHANGE UP (ref 3.5–5.3)
POTASSIUM SERPL-SCNC: 4 MMOL/L — SIGNIFICANT CHANGE UP (ref 3.5–5.3)
RBC # BLD: 5.21 M/UL — SIGNIFICANT CHANGE UP (ref 4.2–5.8)
RBC # FLD: 13.2 % — SIGNIFICANT CHANGE UP (ref 10.3–16.9)
SODIUM SERPL-SCNC: 136 MMOL/L — SIGNIFICANT CHANGE UP (ref 135–145)
WBC # BLD: 7 K/UL — SIGNIFICANT CHANGE UP (ref 3.8–10.5)
WBC # FLD AUTO: 7 K/UL — SIGNIFICANT CHANGE UP (ref 3.8–10.5)

## 2018-04-11 PROCEDURE — 99232 SBSQ HOSP IP/OBS MODERATE 35: CPT

## 2018-04-11 RX ORDER — MAGNESIUM SULFATE 500 MG/ML
2 VIAL (ML) INJECTION ONCE
Qty: 0 | Refills: 0 | Status: COMPLETED | OUTPATIENT
Start: 2018-04-11 | End: 2018-04-11

## 2018-04-11 RX ORDER — FAMOTIDINE 10 MG/ML
20 INJECTION INTRAVENOUS ONCE
Qty: 0 | Refills: 0 | Status: COMPLETED | OUTPATIENT
Start: 2018-04-11 | End: 2018-04-11

## 2018-04-11 RX ADMIN — ESCITALOPRAM OXALATE 5 MILLIGRAM(S): 10 TABLET, FILM COATED ORAL at 11:50

## 2018-04-11 RX ADMIN — Medication 30 MILLILITER(S): at 20:58

## 2018-04-11 RX ADMIN — DONEPEZIL HYDROCHLORIDE 5 MILLIGRAM(S): 10 TABLET, FILM COATED ORAL at 21:00

## 2018-04-11 RX ADMIN — ATORVASTATIN CALCIUM 80 MILLIGRAM(S): 80 TABLET, FILM COATED ORAL at 21:00

## 2018-04-11 RX ADMIN — Medication 50 GRAM(S): at 09:44

## 2018-04-11 RX ADMIN — HEPARIN SODIUM 11 UNIT(S)/HR: 5000 INJECTION INTRAVENOUS; SUBCUTANEOUS at 12:01

## 2018-04-11 RX ADMIN — FAMOTIDINE 20 MILLIGRAM(S): 10 INJECTION INTRAVENOUS at 22:25

## 2018-04-11 RX ADMIN — Medication 81 MILLIGRAM(S): at 11:50

## 2018-04-11 RX ADMIN — ONDANSETRON 4 MILLIGRAM(S): 8 TABLET, FILM COATED ORAL at 22:08

## 2018-04-11 NOTE — PROGRESS NOTE ADULT - SUBJECTIVE AND OBJECTIVE BOX
HEALTH ISSUES - PROBLEM Dx:  Atrial mass: Atrial mass  Anticardiolipin antibody positive: Anticardiolipin antibody positive  Prophylactic measure: Prophylactic measure  Nutrition, metabolism, and development symptoms: Nutrition, metabolism, and development symptoms  MARIELOS (acute kidney injury): MARIELOS (acute kidney injury)  Palpitations: Palpitations  Cerebrovascular accident (CVA) due to occlusion of left middle cerebral artery: Cerebrovascular accident (CVA) due to occlusion of left middle cerebral artery          CHEMOTHERAPY REGIMEN:        Day:                          Diet:  Protocol:                                    IVF:      MEDICATIONS  (STANDING):  aspirin  chewable 81 milliGRAM(s) Oral daily  atorvastatin 80 milliGRAM(s) Oral at bedtime  donepezil 5 milliGRAM(s) Oral at bedtime  escitalopram 5 milliGRAM(s) Oral daily  heparin  Infusion 1100 Unit(s)/Hr (11 mL/Hr) IV Continuous <Continuous>    MEDICATIONS  (PRN):  acetaminophen   Tablet 650 milliGRAM(s) Oral every 6 hours PRN For Temp greater than 38 C (100.4 F)  acetaminophen   Tablet. 650 milliGRAM(s) Oral every 6 hours PRN Mild Pain (1 - 3)  aluminum hydroxide/magnesium hydroxide/simethicone Suspension 30 milliLiter(s) Oral every 6 hours PRN Dyspepsia  clonazePAM Tablet 0.5 milliGRAM(s) Oral at bedtime PRN insomnia  ondansetron Injectable 4 milliGRAM(s) IV Push every 6 hours PRN Nausea and/or Vomiting      Allergies    Allergy Status Unknown    Intolerances        DVT Prophylaxis: [ ] YES [ ] NO      Antibiotics: [ ] YES [ ] NO    Pain Scale (1-10):       Location:    Vital Signs Last 24 Hrs  T(C): 36.9 (11 Apr 2018 18:08), Max: 37.4 (11 Apr 2018 09:51)  T(F): 98.5 (11 Apr 2018 18:08), Max: 99.3 (11 Apr 2018 09:51)  HR: 62 (11 Apr 2018 20:55) (60 - 64)  BP: 163/87 (11 Apr 2018 20:55) (124/83 - 163/87)  BP(mean): 113 (11 Apr 2018 20:55) (93 - 113)  RR: 16 (11 Apr 2018 20:55) (16 - 16)  SpO2: 98% (11 Apr 2018 20:55) (93% - 98%)    Drug Dosing Weight  Height (cm): 180.34 (05 Apr 2018 09:00)  Weight (kg): 92.3 (05 Apr 2018 09:00)  BMI (kg/m2): 28.4 (05 Apr 2018 09:00)  BSA (m2): 2.12 (05 Apr 2018 09:00)     Physical Exam:  · Constitutional	detailed exam  · Constitutional Details	well-developed; well-groomed  · Eyes	EOMI; PERRL; no drainage or redness  · ENMT Comments	dry mucous membranes  · Respiratory	detailed exam  · Respiratory Comments	normal breath sounds at the lung bases bilaterally  · Cardiovascular	Regular rate & rhythm, normal S1, S2; no murmurs, gallops or rubs; no S3, S4  · Abd-Soft non tender  ·Ext-no edema, clubbing or cyanosis    URINARY CATHETER: [ ] YES [ ] NO     LABS:  CBC Full  -  ( 11 Apr 2018 07:15 )  WBC Count : 7.0 K/uL  Hemoglobin : 14.6 g/dL  Hematocrit : 44.4 %  Platelet Count - Automated : 290 K/uL  Mean Cell Volume : 85.2 fL  Mean Cell Hemoglobin : 28.0 pg  Mean Cell Hemoglobin Concentration : 32.9 g/dL  Auto Neutrophil # : x  Auto Lymphocyte # : x  Auto Monocyte # : x  Auto Eosinophil # : x  Auto Basophil # : x  Auto Neutrophil % : x  Auto Lymphocyte % : x  Auto Monocyte % : x  Auto Eosinophil % : x  Auto Basophil % : x    04-11    136  |  99  |  14  ----------------------------<  101<H>  4.0   |  26  |  1.42<H>    Ca    9.5      11 Apr 2018 07:14  Mg     1.7     04-11      PT/INR - ( 10 Apr 2018 03:28 )   PT: 13.2 sec;   INR: 1.19          PTT - ( 11 Apr 2018 07:15 )  PTT:57.5 sec      CULTURES:    RADIOLOGY & ADDITIONAL STUDIES:

## 2018-04-11 NOTE — CHART NOTE - NSCHARTNOTEFT_GEN_A_CORE
Admitting Diagnosis:   Patient is a 52y old  Male who presents with a chief complaint of CVA (07 Apr 2018 16:49)      PAST MEDICAL & SURGICAL HISTORY:  Cerebrovascular accident (CVA) due to occlusion of left middle cerebral artery: Left M2 occlusion  Irregular heart beat      Current Nutrition Order:  DASH/TLC      PO Intake: Good (%) [   ]  Fair (50-75%) [ X  ] Poor (<25%) [   ]    GI Issues: No N/V/C/D reported at this time. Last BM 4/11    Pain: No pain endorsed at this time     Skin Integrity: Vidal 20, intact    Labs:   04-11    136  |  99  |  14  ----------------------------<  101<H>  4.0   |  26  |  1.42<H>    Ca    9.5      11 Apr 2018 07:14  Mg     1.7     04-11      CAPILLARY BLOOD GLUCOSE      POCT Blood Glucose.: 90 mg/dL (10 Apr 2018 22:12)  POCT Blood Glucose.: 89 mg/dL (10 Apr 2018 16:37)      Medications:  MEDICATIONS  (STANDING):  aspirin  chewable 81 milliGRAM(s) Oral daily  atorvastatin 80 milliGRAM(s) Oral at bedtime  donepezil 5 milliGRAM(s) Oral at bedtime  escitalopram 5 milliGRAM(s) Oral daily  heparin  Infusion 1100 Unit(s)/Hr (11 mL/Hr) IV Continuous <Continuous>    MEDICATIONS  (PRN):  acetaminophen   Tablet 650 milliGRAM(s) Oral every 6 hours PRN For Temp greater than 38 C (100.4 F)  acetaminophen   Tablet. 650 milliGRAM(s) Oral every 6 hours PRN Mild Pain (1 - 3)  clonazePAM Tablet 0.5 milliGRAM(s) Oral at bedtime PRN insomnia  ondansetron Injectable 4 milliGRAM(s) IV Push every 6 hours PRN Nausea and/or Vomiting      Weight: 92.3kg  Daily     Daily     Weight Change: No new weights recorded since admit     Estimated energy needs: ABW used for calculations as pt between % of IBW. Needs estimated for maintenance in adults; adjusted protein needs for s/p CVA  Calories: 25-30 kcal/kg = 2165-7985 kcal/day  Protein: 1.0-1.2 g/kg =  g protein/day  Fluids: 25-30 mL/kg = 3165-6529 mL/day    Subjective: 53 yo/male admitted s/p CVA, thrombectomy and tPA administration. S/p BRANDON showing large L. atrial appendage mass. Pt seen in room, awake, alert, very pleasant, wife at bedside. Endorses fair intake, ~60% at breakfast, "could be better" per wife. No complaints at this time, and tolerating texture well. Discussed high protein intake and reviewed DASH diet education.    Previous Nutrition Diagnosis:  Inadequate oral intake RT inability to meet needs via PO intake 2/2 dysphagia AEB NPO    Active [   ]  Resolved [ X  ]    If resolved, new PES: Increased protein-calorie needs RT increased demand for intake AEB s/p CVA    Goal: Pt will meet % of EER per day via oral intake     Recommendations:  1. Continue with current diet order; if PO intake appears to be poor, recommend Ensure 1x/day (350 kcal, 20g protein)  2. Encourage PO intake  3. Trend weights     Education: Discussed DASH diet with pt and wife; Pt verbalized understanding of education provided.     Risk Level: High [   ] Moderate [ X  ] Low [   ]

## 2018-04-11 NOTE — PROGRESS NOTE ADULT - ATTENDING COMMENTS
Patient seen and examined with Resident.  Agree with above.  No acute complaints.  No bleeding.  Continuing plan of anticoagulation with heparin drip with PTT goal 50-70 given large left atrial appendage clot.  Minimal activity within the room and no physical therapy until cleared by Dr. Mcfadden.

## 2018-04-11 NOTE — PROGRESS NOTE ADULT - ASSESSMENT
53yo M with no PMH presenting with acute left M2 occlusion from [facility] (s/p tPA), now s/p thrombectomy, s/p BRANDON showing large L atrial appendage mass, on heparin gtt.

## 2018-04-11 NOTE — PROGRESS NOTE ADULT - SUBJECTIVE AND OBJECTIVE BOX
CC: Patient is a 52y old  Male who presents with a chief complaint of CVA (07 Apr 2018 16:49)    OVERNIGHT EVENTS: NAEO.    SUBJECTIVE / INTERVAL HPI: Patient seen and examined at bedside.     VITAL SIGNS:  Vital Signs Last 24 Hrs  T(C): 36.4 (11 Apr 2018 05:00), Max: 37.1 (11 Apr 2018 01:52)  T(F): 97.6 (11 Apr 2018 05:00), Max: 98.8 (11 Apr 2018 01:52)  HR: 60 (11 Apr 2018 04:51) (58 - 64)  BP: 124/83 (11 Apr 2018 04:51) (124/83 - 150/84)  BP(mean): 98 (11 Apr 2018 04:51) (93 - 109)  RR: 16 (11 Apr 2018 04:51) (12 - 16)  SpO2: 95% (11 Apr 2018 04:51) (93% - 97%)    PHYSICAL EXAM:    General: lying in bed, appears comfortable, in NAD  HEENT: EOMI, anicteric, R facial droop, able to move bilateral eyebrows R>L  Neck: supple, no LAD  Cardiovascular: +S1/S2; RRR  Respiratory: CTA B/L; no W/R/R  Gastrointestinal: soft, NT/ND; +BSx4  Extremities: WWP; no edema, clubbing or cyanosis  Vascular: 2+ radial, DP/PT pulses B/L  Neurological: AOx3; improved speech, R facial droop, able to move bilateral eyebrows R>L, 5/5 strength LUE and LLE, 0/5 RUE unable to lift right arm, 4/5 RLE strength    MEDICATIONS:  MEDICATIONS  (STANDING):  aspirin  chewable 81 milliGRAM(s) Oral daily  atorvastatin 80 milliGRAM(s) Oral at bedtime  donepezil 5 milliGRAM(s) Oral at bedtime  escitalopram 5 milliGRAM(s) Oral daily  heparin  Infusion 1100 Unit(s)/Hr (11 mL/Hr) IV Continuous <Continuous>    MEDICATIONS  (PRN):  acetaminophen   Tablet 650 milliGRAM(s) Oral every 6 hours PRN For Temp greater than 38 C (100.4 F)  acetaminophen   Tablet. 650 milliGRAM(s) Oral every 6 hours PRN Mild Pain (1 - 3)  clonazePAM Tablet 0.5 milliGRAM(s) Oral at bedtime PRN insomnia  ondansetron Injectable 4 milliGRAM(s) IV Push every 6 hours PRN Nausea and/or Vomiting      ALLERGIES:  Allergies    Allergy Status Unknown    Intolerances        LABS:                        14.0   5.4   )-----------( 266      ( 10 Apr 2018 03:28 )             41.5     04-10    132<L>  |  96  |  14  ----------------------------<  103<H>  4.0   |  25  |  1.30    Ca    9.0      10 Apr 2018 03:28  Mg     1.7     04-10      PT/INR - ( 10 Apr 2018 03:28 )   PT: 13.2 sec;   INR: 1.19          PTT - ( 10 Apr 2018 22:48 )  PTT:63.7 sec    CAPILLARY BLOOD GLUCOSE      POCT Blood Glucose.: 90 mg/dL (10 Apr 2018 22:12)      RADIOLOGY & ADDITIONAL TESTS: Reviewed.

## 2018-04-11 NOTE — PROGRESS NOTE ADULT - PROBLEM SELECTOR PLAN 7
VTE ppx: hep 5000 q8h  GI ppx: yasmin Mayorga  [facility]    Code status: FULL  Dispo: [facility], acute rehab on discharge VTE ppx: heparin drip  GI ppx: Colace, senna  [facility]    Code status: FULL  Dispo: [facility], acute rehab on discharge

## 2018-04-11 NOTE — PROGRESS NOTE ADULT - PROBLEM SELECTOR PLAN 1
Discussed with pt and his wife, his negative hypercoagulable results. Discussed MTHFR gene mutation, and HO were advised to provide pt with copy of his hypercoagulable results.

## 2018-04-11 NOTE — PROGRESS NOTE ADULT - PROBLEM SELECTOR PLAN 1
Pt with stroke code at [facility], where CT head showed left M2 occlusion with R-sided deficits (hemiplegia, R facial droop), CTA showing same L M2 occlusion, now s/p thrombectomy. Heme/onc on board to evaluate coagulopathy.  - c/w Lipitor 80mg  - c/w aspirin 300mg rectal daily  - A1c 5.1, cholesterol 210, HDL 65, LDL 90,   - permissive HTN -160s  - neuro checks q4h for hemorrhagic conversion  - Heme/onc Dr. Rodriguez following, f/u recs (+anticardiolipin Ab): off Lovenox and Coumadin  - US lower extremity dopplers neg for DVT  - TTE (4/6): EF 60%, trace TR, MR, KS; no interatrial shunt  - BRANDON showed 2cm L atrial appendage mass, on heparin gtt  - Lexapro 5mg daily for motor recovery  - Aricept 5mg PO daily at bedtime for neurosignaling s/p stroke

## 2018-04-12 LAB
ANION GAP SERPL CALC-SCNC: 13 MMOL/L — SIGNIFICANT CHANGE UP (ref 5–17)
APTT BLD: 53.3 SEC — HIGH (ref 27.5–37.4)
APTT BLD: 65 SEC — HIGH (ref 27.5–37.4)
APTT BLD: 65.3 SEC — HIGH (ref 27.5–37.4)
APTT BLD: 76.7 SEC — HIGH (ref 27.5–37.4)
BUN SERPL-MCNC: 13 MG/DL — SIGNIFICANT CHANGE UP (ref 7–23)
CALCIUM SERPL-MCNC: 9.5 MG/DL — SIGNIFICANT CHANGE UP (ref 8.4–10.5)
CHLORIDE SERPL-SCNC: 96 MMOL/L — SIGNIFICANT CHANGE UP (ref 96–108)
CO2 SERPL-SCNC: 24 MMOL/L — SIGNIFICANT CHANGE UP (ref 22–31)
CREAT SERPL-MCNC: 1.39 MG/DL — HIGH (ref 0.5–1.3)
GLUCOSE SERPL-MCNC: 116 MG/DL — HIGH (ref 70–99)
HCT VFR BLD CALC: 43.9 % — SIGNIFICANT CHANGE UP (ref 39–50)
HGB BLD-MCNC: 14.5 G/DL — SIGNIFICANT CHANGE UP (ref 13–17)
MAGNESIUM SERPL-MCNC: 2 MG/DL — SIGNIFICANT CHANGE UP (ref 1.6–2.6)
MCHC RBC-ENTMCNC: 28 PG — SIGNIFICANT CHANGE UP (ref 27–34)
MCHC RBC-ENTMCNC: 33 G/DL — SIGNIFICANT CHANGE UP (ref 32–36)
MCV RBC AUTO: 84.7 FL — SIGNIFICANT CHANGE UP (ref 80–100)
PHOSPHATE SERPL-MCNC: 3.3 MG/DL — SIGNIFICANT CHANGE UP (ref 2.5–4.5)
PLATELET # BLD AUTO: 301 K/UL — SIGNIFICANT CHANGE UP (ref 150–400)
POTASSIUM SERPL-MCNC: 4.3 MMOL/L — SIGNIFICANT CHANGE UP (ref 3.5–5.3)
POTASSIUM SERPL-SCNC: 4.3 MMOL/L — SIGNIFICANT CHANGE UP (ref 3.5–5.3)
RBC # BLD: 5.18 M/UL — SIGNIFICANT CHANGE UP (ref 4.2–5.8)
RBC # FLD: 13.2 % — SIGNIFICANT CHANGE UP (ref 10.3–16.9)
SODIUM SERPL-SCNC: 133 MMOL/L — LOW (ref 135–145)
WBC # BLD: 7.7 K/UL — SIGNIFICANT CHANGE UP (ref 3.8–10.5)
WBC # FLD AUTO: 7.7 K/UL — SIGNIFICANT CHANGE UP (ref 3.8–10.5)

## 2018-04-12 PROCEDURE — 99232 SBSQ HOSP IP/OBS MODERATE 35: CPT

## 2018-04-12 RX ORDER — DOCUSATE SODIUM 100 MG
100 CAPSULE ORAL DAILY
Qty: 0 | Refills: 0 | Status: DISCONTINUED | OUTPATIENT
Start: 2018-04-12 | End: 2018-05-01

## 2018-04-12 RX ORDER — HEPARIN SODIUM 5000 [USP'U]/ML
1200 INJECTION INTRAVENOUS; SUBCUTANEOUS
Qty: 25000 | Refills: 0 | Status: DISCONTINUED | OUTPATIENT
Start: 2018-04-12 | End: 2018-04-19

## 2018-04-12 RX ORDER — POLYETHYLENE GLYCOL 3350 17 G/17G
17 POWDER, FOR SOLUTION ORAL DAILY
Qty: 0 | Refills: 0 | Status: DISCONTINUED | OUTPATIENT
Start: 2018-04-12 | End: 2018-05-01

## 2018-04-12 RX ORDER — SENNA PLUS 8.6 MG/1
1 TABLET ORAL DAILY
Qty: 0 | Refills: 0 | Status: DISCONTINUED | OUTPATIENT
Start: 2018-04-12 | End: 2018-05-01

## 2018-04-12 RX ADMIN — DONEPEZIL HYDROCHLORIDE 5 MILLIGRAM(S): 10 TABLET, FILM COATED ORAL at 21:57

## 2018-04-12 RX ADMIN — HEPARIN SODIUM 12 UNIT(S)/HR: 5000 INJECTION INTRAVENOUS; SUBCUTANEOUS at 08:23

## 2018-04-12 RX ADMIN — Medication 81 MILLIGRAM(S): at 11:56

## 2018-04-12 RX ADMIN — ATORVASTATIN CALCIUM 80 MILLIGRAM(S): 80 TABLET, FILM COATED ORAL at 21:57

## 2018-04-12 RX ADMIN — Medication 30 MILLILITER(S): at 12:25

## 2018-04-12 RX ADMIN — ESCITALOPRAM OXALATE 5 MILLIGRAM(S): 10 TABLET, FILM COATED ORAL at 11:56

## 2018-04-12 NOTE — PROGRESS NOTE ADULT - ASSESSMENT
51yo M with no PMH presenting with acute left M2 occlusion from [facility] (s/p tPA), now s/p thrombectomy, s/p BRANDON showing large L atrial appendage mass, on heparin gtt.

## 2018-04-12 NOTE — PROGRESS NOTE ADULT - SUBJECTIVE AND OBJECTIVE BOX
CC: Patient is a 52y old  Male who presents with a chief complaint of CVA (07 Apr 2018 16:49)    OVERNIGHT EVENTS: Pt with abdominal discomfort, given Maalox and Pepcid with improvement.    SUBJECTIVE / INTERVAL HPI: Patient seen and examined at bedside. No complaints this AM.    VITAL SIGNS:  Vital Signs Last 24 Hrs  T(C): 36.3 (12 Apr 2018 12:57), Max: 36.9 (11 Apr 2018 18:08)  T(F): 97.4 (12 Apr 2018 12:57), Max: 98.5 (11 Apr 2018 18:08)  HR: 58 (12 Apr 2018 16:17) (58 - 73)  BP: 145/76 (12 Apr 2018 16:17) (132/77 - 163/87)  BP(mean): 103 (12 Apr 2018 16:17) (99 - 118)  RR: 12 (12 Apr 2018 16:17) (12 - 16)  SpO2: 98% (12 Apr 2018 16:17) (96% - 98%)    PHYSICAL EXAM:    General: middle-aged male lying in bed, appears comfortable, in NAD  HEENT: EOMI, anicteric, R facial droop, able to move bilateral eyebrows R>L  Neck: supple, no LAD  Cardiovascular: +S1/S2; RRR  Respiratory: CTA B/L; no W/R/R  Gastrointestinal: soft, NT/ND; +BSx4  Extremities: WWP; no edema, clubbing or cyanosis  Vascular: 2+ radial, DP/PT pulses B/L  Neurological: AOx3; improved speech, R facial droop, able to move bilateral eyebrows R>L, 5/5 strength LUE and LLE, 0/5 RUE unable to lift right arm, 4/5 RLE strength    MEDICATIONS:  MEDICATIONS  (STANDING):  aspirin  chewable 81 milliGRAM(s) Oral daily  atorvastatin 80 milliGRAM(s) Oral at bedtime  docusate sodium 100 milliGRAM(s) Oral daily  donepezil 5 milliGRAM(s) Oral at bedtime  escitalopram 5 milliGRAM(s) Oral daily  heparin  Infusion 1200 Unit(s)/Hr (12 mL/Hr) IV Continuous <Continuous>  senna 1 Tablet(s) Oral daily    MEDICATIONS  (PRN):  acetaminophen   Tablet 650 milliGRAM(s) Oral every 6 hours PRN For Temp greater than 38 C (100.4 F)  acetaminophen   Tablet. 650 milliGRAM(s) Oral every 6 hours PRN Mild Pain (1 - 3)  aluminum hydroxide/magnesium hydroxide/simethicone Suspension 30 milliLiter(s) Oral every 6 hours PRN Dyspepsia  clonazePAM Tablet 0.5 milliGRAM(s) Oral at bedtime PRN insomnia  ondansetron Injectable 4 milliGRAM(s) IV Push every 6 hours PRN Nausea and/or Vomiting  polyethylene glycol 3350 17 Gram(s) Oral daily PRN Constipation      ALLERGIES:  Allergies    Allergy Status Unknown    Intolerances        LABS:                        14.5   7.7   )-----------( 301      ( 12 Apr 2018 07:19 )             43.9     04-12    133<L>  |  96  |  13  ----------------------------<  116<H>  4.3   |  24  |  1.39<H>    Ca    9.5      12 Apr 2018 07:19  Phos  3.3     04-12  Mg     2.0     04-12      PTT - ( 12 Apr 2018 12:39 )  PTT:65.0 sec    CAPILLARY BLOOD GLUCOSE      POCT Blood Glucose.: 90 mg/dL (10 Apr 2018 22:12)      RADIOLOGY & ADDITIONAL TESTS: Reviewed.

## 2018-04-12 NOTE — PROGRESS NOTE ADULT - PROBLEM SELECTOR PLAN 7
VTE ppx: heparin drip  GI ppx: Colace, senna  [facility]    Code status: FULL  Dispo: [facility], acute rehab on discharge

## 2018-04-12 NOTE — PROGRESS NOTE ADULT - ATTENDING COMMENTS
Patient seen and examined.  Agree with above.  No major complaints.  Patient with aphasia and right hemiparesis.  He can lift his right arm using his shoulder muscles.    Heparin drip is therapeutic for treatment of left atrial clot.  He can do minimal exercise in the room.  Encouraged him to sit in chair for additional time period during the day.  Reassessment this coming week, as per Dr. Mcfadden.

## 2018-04-12 NOTE — PROGRESS NOTE ADULT - PROBLEM SELECTOR PLAN 1
Pt with stroke code at [facility], where CT head showed left M2 occlusion with R-sided deficits (hemiplegia, R facial droop), CTA showing same L M2 occlusion, now s/p thrombectomy. Heme/onc on board to evaluate coagulopathy.  - c/w Lipitor 80mg  - c/w aspirin 300mg rectal daily  - A1c 5.1, cholesterol 210, HDL 65, LDL 90,   - permissive HTN -160s  - neuro checks q4h for hemorrhagic conversion  - Heme/onc Dr. Rodriguez following, f/u recs (+anticardiolipin Ab): off Lovenox and Coumadin  - US lower extremity dopplers neg for DVT  - TTE (4/6): EF 60%, trace TR, MR, IL; no interatrial shunt  - BRANDON showed 2cm L atrial appendage mass, on heparin gtt  - Lexapro 5mg daily for motor recovery  - Aricept 5mg PO daily at bedtime for neurosignaling s/p stroke

## 2018-04-13 LAB
HCT VFR BLD CALC: 45 % — SIGNIFICANT CHANGE UP (ref 39–50)
HGB BLD-MCNC: 14.7 G/DL — SIGNIFICANT CHANGE UP (ref 13–17)
MCHC RBC-ENTMCNC: 27.9 PG — SIGNIFICANT CHANGE UP (ref 27–34)
MCHC RBC-ENTMCNC: 32.7 G/DL — SIGNIFICANT CHANGE UP (ref 32–36)
MCV RBC AUTO: 85.6 FL — SIGNIFICANT CHANGE UP (ref 80–100)
PLATELET # BLD AUTO: 321 K/UL — SIGNIFICANT CHANGE UP (ref 150–400)
RBC # BLD: 5.26 M/UL — SIGNIFICANT CHANGE UP (ref 4.2–5.8)
RBC # FLD: 13.2 % — SIGNIFICANT CHANGE UP (ref 10.3–16.9)
WBC # BLD: 5.8 K/UL — SIGNIFICANT CHANGE UP (ref 3.8–10.5)
WBC # FLD AUTO: 5.8 K/UL — SIGNIFICANT CHANGE UP (ref 3.8–10.5)

## 2018-04-13 PROCEDURE — 99232 SBSQ HOSP IP/OBS MODERATE 35: CPT

## 2018-04-13 RX ADMIN — Medication 100 MILLIGRAM(S): at 12:02

## 2018-04-13 RX ADMIN — ATORVASTATIN CALCIUM 80 MILLIGRAM(S): 80 TABLET, FILM COATED ORAL at 22:05

## 2018-04-13 RX ADMIN — SENNA PLUS 1 TABLET(S): 8.6 TABLET ORAL at 12:02

## 2018-04-13 RX ADMIN — DONEPEZIL HYDROCHLORIDE 5 MILLIGRAM(S): 10 TABLET, FILM COATED ORAL at 22:05

## 2018-04-13 RX ADMIN — ESCITALOPRAM OXALATE 5 MILLIGRAM(S): 10 TABLET, FILM COATED ORAL at 12:02

## 2018-04-13 RX ADMIN — Medication 81 MILLIGRAM(S): at 12:04

## 2018-04-13 NOTE — PROGRESS NOTE ADULT - ASSESSMENT
51yo M with no PMH presenting with acute left M2 occlusion from [facility] (s/p tPA), now s/p thrombectomy, s/p BRANDON showing large L atrial appendage mass, on heparin gtt.

## 2018-04-13 NOTE — PROGRESS NOTE ADULT - SUBJECTIVE AND OBJECTIVE BOX
CC: Patient is a 52y old  Male who presents with a chief complaint of CVA (07 Apr 2018 16:49)    OVERNIGHT EVENTS: NAEO    SUBJECTIVE / INTERVAL HPI: Patient seen and examined at bedside. Pt with no abdominal pain, nausea or vomiting, no newfound weakness.    VITAL SIGNS:  Vital Signs Last 24 Hrs  T(C): 36.9 (13 Apr 2018 13:18), Max: 37.1 (13 Apr 2018 09:19)  T(F): 98.5 (13 Apr 2018 13:18), Max: 98.7 (13 Apr 2018 09:19)  HR: 58 (13 Apr 2018 08:18) (56 - 70)  BP: 151/79 (13 Apr 2018 08:18) (139/88 - 151/79)  BP(mean): 109 (13 Apr 2018 08:18) (109 - 109)  RR: 12 (13 Apr 2018 08:18) (12 - 18)  SpO2: 98% (13 Apr 2018 08:18) (96% - 98%)    PHYSICAL EXAM:    General: middle-aged male lying in bed, appears comfortable, in NAD  HEENT: EOMI, anicteric, R facial droop, able to move bilateral eyebrows R>L  Neck: supple, no LAD  Cardiovascular: +S1/S2; RRR  Respiratory: CTA B/L; no W/R/R  Gastrointestinal: soft, NT/ND; +BSx4  Extremities: WWP; no edema, clubbing or cyanosis  Vascular: 2+ radial, DP/PT pulses B/L  Neurological: AOx3; improved speech, R facial droop, able to move bilateral eyebrows L>R, 5/5 strength LUE and LLE, 2/5 RUE unable to lift right arm, 5/5 RLE strength      MEDICATIONS:  MEDICATIONS  (STANDING):  aspirin  chewable 81 milliGRAM(s) Oral daily  atorvastatin 80 milliGRAM(s) Oral at bedtime  docusate sodium 100 milliGRAM(s) Oral daily  donepezil 5 milliGRAM(s) Oral at bedtime  escitalopram 5 milliGRAM(s) Oral daily  heparin  Infusion 1200 Unit(s)/Hr (12 mL/Hr) IV Continuous <Continuous>  senna 1 Tablet(s) Oral daily    MEDICATIONS  (PRN):  acetaminophen   Tablet 650 milliGRAM(s) Oral every 6 hours PRN For Temp greater than 38 C (100.4 F)  acetaminophen   Tablet. 650 milliGRAM(s) Oral every 6 hours PRN Mild Pain (1 - 3)  aluminum hydroxide/magnesium hydroxide/simethicone Suspension 30 milliLiter(s) Oral every 6 hours PRN Dyspepsia  clonazePAM Tablet 0.5 milliGRAM(s) Oral at bedtime PRN insomnia  ondansetron Injectable 4 milliGRAM(s) IV Push every 6 hours PRN Nausea and/or Vomiting  polyethylene glycol 3350 17 Gram(s) Oral daily PRN Constipation      ALLERGIES:  Allergies    Allergy Status Unknown    Intolerances        LABS:                        14.7   5.8   )-----------( 321      ( 13 Apr 2018 11:17 )             45.0     04-12    133<L>  |  96  |  13  ----------------------------<  116<H>  4.3   |  24  |  1.39<H>    Ca    9.5      12 Apr 2018 07:19  Phos  3.3     04-12  Mg     2.0     04-12      PTT - ( 13 Apr 2018 11:17 )  PTT:61.2 sec    CAPILLARY BLOOD GLUCOSE          RADIOLOGY & ADDITIONAL TESTS: Reviewed.

## 2018-04-13 NOTE — PROGRESS NOTE ADULT - PROBLEM SELECTOR PLAN 1
Pt with stroke code at [facility], where CT head showed left M2 occlusion with R-sided deficits (hemiplegia, R facial droop), CTA showing same L M2 occlusion, now s/p thrombectomy. Heme/onc on board to evaluate coagulopathy.  - c/w Lipitor 80mg  - c/w aspirin 300mg rectal daily  - A1c 5.1, cholesterol 210, HDL 65, LDL 90,   - permissive HTN -160s  - neuro checks q4h for hemorrhagic conversion  - Heme/onc Dr. Rodriguez following, f/u recs (+anticardiolipin Ab): off Lovenox and Coumadin  - US lower extremity dopplers neg for DVT  - TTE (4/6): EF 60%, trace TR, MR, FL; no interatrial shunt  - BRANDON showed 2cm L atrial appendage mass, on heparin gtt  - Lexapro 5mg daily for motor recovery  - Aricept 5mg PO daily at bedtime for neurosignaling s/p stroke

## 2018-04-13 NOTE — PROGRESS NOTE ADULT - ATTENDING COMMENTS
Patient seen and examined.  Agree with above.  Patient with aphasia and right hemiparesis.  He can lift his right arm using his shoulder muscles.  His right leg is 3-4/5  Heparin drip is therapeutic for treatment of left atrial clot.  Reassessment this coming week, as per Dr. Mcfadden.  Encouraged minimal exercise in the room of his right side.

## 2018-04-13 NOTE — PROGRESS NOTE ADULT - PROBLEM SELECTOR PLAN 7
VTE ppx: heparin gtt  GI ppx: Colace, senna  [facility]    Code status: FULL  Dispo: [facility], acute rehab on discharge

## 2018-04-14 LAB — APTT BLD: 84.4 SEC — HIGH (ref 27.5–37.4)

## 2018-04-14 PROCEDURE — 99232 SBSQ HOSP IP/OBS MODERATE 35: CPT

## 2018-04-14 RX ADMIN — DONEPEZIL HYDROCHLORIDE 5 MILLIGRAM(S): 10 TABLET, FILM COATED ORAL at 21:43

## 2018-04-14 RX ADMIN — SENNA PLUS 1 TABLET(S): 8.6 TABLET ORAL at 11:41

## 2018-04-14 RX ADMIN — ESCITALOPRAM OXALATE 5 MILLIGRAM(S): 10 TABLET, FILM COATED ORAL at 11:41

## 2018-04-14 RX ADMIN — Medication 100 MILLIGRAM(S): at 11:41

## 2018-04-14 RX ADMIN — Medication 81 MILLIGRAM(S): at 11:41

## 2018-04-14 RX ADMIN — ATORVASTATIN CALCIUM 80 MILLIGRAM(S): 80 TABLET, FILM COATED ORAL at 21:43

## 2018-04-14 NOTE — PROGRESS NOTE ADULT - PROBLEM SELECTOR PLAN 1
Pt with stroke code at [facility], where CT head showed left M2 occlusion with R-sided deficits (hemiplegia, R facial droop), CTA showing same L M2 occlusion, now s/p thrombectomy. Heme/onc on board to evaluate coagulopathy.  - c/w Lipitor 80mg  - c/w aspirin 300mg rectal daily  - A1c 5.1, cholesterol 210, HDL 65, LDL 90,   - permissive HTN -160s  - neuro checks q4h for hemorrhagic conversion  - Heme/onc Dr. Rodriguez following, f/u recs (+anticardiolipin Ab): off Lovenox and Coumadin  - US lower extremity dopplers neg for DVT  - TTE (4/6): EF 60%, trace TR, MR, MD; no interatrial shunt  - BRANDON showed 2cm L atrial appendage mass, on heparin gtt  - Lexapro 5mg daily for motor recovery  - Aricept 5mg PO daily at bedtime for neurosignaling s/p stroke

## 2018-04-14 NOTE — PROGRESS NOTE ADULT - ASSESSMENT
53yo M with no PMH presenting with acute left M2 occlusion from [facility] (s/p tPA), now s/p thrombectomy, s/p BRANDON showing large L atrial appendage mass, on heparin gtt.

## 2018-04-14 NOTE — PROGRESS NOTE ADULT - ATTENDING COMMENTS
Patient seen and examined.  Agree with above.  Patient with aphasia and right hemiparesis.  He can lift his right arm using his shoulder muscles.  His right leg is stronger.  Heparin drip is therapeutic for treatment of left atrial clot.  Reassessment this coming week, as per Dr. Mcfadden.  Encouraged minimal exercise in the room of his right side.

## 2018-04-14 NOTE — PROGRESS NOTE ADULT - SUBJECTIVE AND OBJECTIVE BOX
INTERVAL EVENTS: Patient seen and examined at bedside. No acute events overnight. VSS. Denies fevers, chills, CP, SOB, abdominal pain, N/V/D, urinary symptoms.     OBJECTIVE:    Vital Signs Last 12 Hrs  T(F): 98.2 (04-14-18 @ 10:02), Max: 98.8 (04-14-18 @ 01:00)  HR: 60 (04-14-18 @ 08:41) (54 - 60)  BP: 146/81 (04-14-18 @ 08:41) (119/64 - 146/81)  BP(mean): 105 (04-14-18 @ 08:41) (85 - 105)  RR: 18 (04-14-18 @ 08:41) (16 - 18)  SpO2: 98% (04-14-18 @ 08:41) (96% - 98%)  I&O's Summary    13 Apr 2018 07:01  -  14 Apr 2018 07:00  --------------------------------------------------------  IN: 180 mL / OUT: 120 mL / NET: 60 mL        PHYSICAL EXAM:    General: middle-aged male lying in bed, appears comfortable, in NAD  HEENT: EOMI, anicteric, R facial droop, able to move bilateral eyebrows R>L  Neck: supple, no LAD  Cardiovascular: +S1/S2; RRR  Respiratory: CTA B/L; no W/R/R  Gastrointestinal: soft, NT/ND; +BSx4  Extremities: WWP; no edema, clubbing or cyanosis  Vascular: 2+ radial, DP/PT pulses B/L  Neurological: AOx3; improved speech, R facial droop, able to move bilateral eyebrows L>R, 5/5 strength LUE and LLE, 2/5 RUE unable to lift right arm, 5/5 RLE strength          LABS:                        14.7   5.8   )-----------( 321      ( 13 Apr 2018 11:17 )             45.0           PTT - ( 14 Apr 2018 05:43 )  PTT:84.4 sec      RADIOLOGY & ADDITIONAL TESTS:    MEDICATIONS  (STANDING):  aspirin  chewable 81 milliGRAM(s) Oral daily  atorvastatin 80 milliGRAM(s) Oral at bedtime  docusate sodium 100 milliGRAM(s) Oral daily  donepezil 5 milliGRAM(s) Oral at bedtime  escitalopram 5 milliGRAM(s) Oral daily  heparin  Infusion 1200 Unit(s)/Hr (12 mL/Hr) IV Continuous <Continuous>  senna 1 Tablet(s) Oral daily    MEDICATIONS  (PRN):  acetaminophen   Tablet 650 milliGRAM(s) Oral every 6 hours PRN For Temp greater than 38 C (100.4 F)  acetaminophen   Tablet. 650 milliGRAM(s) Oral every 6 hours PRN Mild Pain (1 - 3)  aluminum hydroxide/magnesium hydroxide/simethicone Suspension 30 milliLiter(s) Oral every 6 hours PRN Dyspepsia  clonazePAM Tablet 0.5 milliGRAM(s) Oral at bedtime PRN insomnia  ondansetron Injectable 4 milliGRAM(s) IV Push every 6 hours PRN Nausea and/or Vomiting  polyethylene glycol 3350 17 Gram(s) Oral daily PRN Constipation

## 2018-04-15 DIAGNOSIS — R79.89 OTHER SPECIFIED ABNORMAL FINDINGS OF BLOOD CHEMISTRY: ICD-10-CM

## 2018-04-15 LAB — APTT BLD: 76.5 SEC — HIGH (ref 27.5–37.4)

## 2018-04-15 PROCEDURE — 99232 SBSQ HOSP IP/OBS MODERATE 35: CPT

## 2018-04-15 RX ADMIN — SENNA PLUS 1 TABLET(S): 8.6 TABLET ORAL at 11:10

## 2018-04-15 RX ADMIN — DONEPEZIL HYDROCHLORIDE 5 MILLIGRAM(S): 10 TABLET, FILM COATED ORAL at 23:43

## 2018-04-15 RX ADMIN — ATORVASTATIN CALCIUM 80 MILLIGRAM(S): 80 TABLET, FILM COATED ORAL at 23:43

## 2018-04-15 RX ADMIN — ESCITALOPRAM OXALATE 5 MILLIGRAM(S): 10 TABLET, FILM COATED ORAL at 11:10

## 2018-04-15 RX ADMIN — Medication 100 MILLIGRAM(S): at 11:10

## 2018-04-15 RX ADMIN — Medication 81 MILLIGRAM(S): at 11:10

## 2018-04-15 NOTE — PROGRESS NOTE ADULT - PROBLEM SELECTOR PLAN 5
Pt with unknown Cr baseline, however with Cr 1.38 on admission. Stable aroudn admission Cr  - continue to monitor BMPs  - off IVFs

## 2018-04-15 NOTE — PROGRESS NOTE ADULT - ATTENDING COMMENTS
Patient seen and examined.  Agree with above.  Patient with aphasia and right hemiparesis.  He can lift his right arm using his shoulder muscles.  His right leg is stronger.  Heparin drip is therapeutic for treatment of left atrial clot.  Reassessment this coming week, as per Dr. Mcfadden.  Answered his and his wife's concerns

## 2018-04-15 NOTE — PROGRESS NOTE ADULT - PROBLEM SELECTOR PLAN 1
Pt with stroke code at [facility], where CT head showed left M2 occlusion with R-sided deficits (hemiplegia, R facial droop), CTA showing same L M2 occlusion, now s/p thrombectomy. Heme/onc on board to evaluate coagulopathy.  - c/w Lipitor 80mg  - c/w aspirin 300mg rectal daily  - A1c 5.1, cholesterol 210, HDL 65, LDL 90,   - permissive HTN -160s  - neuro checks q4h for hemorrhagic conversion  - Heme/onc Dr. Rodriguez following, f/u recs (+anticardiolipin Ab): off Lovenox and Coumadin  - US lower extremity dopplers neg for DVT  - TTE (4/6): EF 60%, trace TR, MR, [place]; no interatrial shunt  - BRANDON showed 2cm L atrial appendage mass, on heparin gtt  - Lexapro 5mg daily for motor recovery  - Aricept 5mg PO daily at bedtime for neurosignaling s/p stroke

## 2018-04-15 NOTE — PROGRESS NOTE ADULT - PROBLEM SELECTOR PLAN 3
BRANDON (4/9) showed large L atrial mass, now on heparin gtt  - neurosurgery consulted, f/u recs  - planning for repeat BRANDON tomorrow, NPO after midnight  - daily PTTs

## 2018-04-15 NOTE — PROGRESS NOTE ADULT - ASSESSMENT
53yo M with no PMH presenting with acute left M2 occlusion from [facility] (s/p tPA), now s/p thrombectomy, s/p BRANDON showing large L atrial appendage mass, on heparin gtt.

## 2018-04-15 NOTE — PROGRESS NOTE ADULT - SUBJECTIVE AND OBJECTIVE BOX
CC:     OVERNIGHT EVENTS:    SUBJECTIVE / INTERVAL HPI: Patient seen and examined at bedside.     VITAL SIGNS:  Vital Signs Last 24 Hrs  T(C): 37 (15 Apr 2018 09:12), Max: 37.6 (14 Apr 2018 18:00)  T(F): 98.6 (15 Apr 2018 09:12), Max: 99.7 (14 Apr 2018 18:00)  HR: 56 (15 Apr 2018 11:19) (54 - 62)  BP: 139/84 (15 Apr 2018 11:19) (130/83 - 146/87)  BP(mean): 105 (15 Apr 2018 11:19) (100 - 109)  RR: 18 (15 Apr 2018 11:19) (18 - 19)  SpO2: 97% (15 Apr 2018 11:19) (96% - 98%)    PHYSICAL EXAM:        MEDICATIONS:  MEDICATIONS  (STANDING):  aspirin  chewable 81 milliGRAM(s) Oral daily  atorvastatin 80 milliGRAM(s) Oral at bedtime  docusate sodium 100 milliGRAM(s) Oral daily  donepezil 5 milliGRAM(s) Oral at bedtime  escitalopram 5 milliGRAM(s) Oral daily  heparin  Infusion 1200 Unit(s)/Hr (12 mL/Hr) IV Continuous <Continuous>  senna 1 Tablet(s) Oral daily    MEDICATIONS  (PRN):  acetaminophen   Tablet 650 milliGRAM(s) Oral every 6 hours PRN For Temp greater than 38 C (100.4 F)  acetaminophen   Tablet. 650 milliGRAM(s) Oral every 6 hours PRN Mild Pain (1 - 3)  clonazePAM Tablet 0.5 milliGRAM(s) Oral at bedtime PRN insomnia  polyethylene glycol 3350 17 Gram(s) Oral daily PRN Constipation      ALLERGIES:  Allergies    Allergy Status Unknown    Intolerances        LABS:          PTT - ( 15 Apr 2018 07:31 )  PTT:76.5 sec    CAPILLARY BLOOD GLUCOSE          RADIOLOGY & ADDITIONAL TESTS: Reviewed. CC: Patient is a 52y old  Male who presents with a chief complaint of CVA (07 Apr 2018 16:49)    OVERNIGHT EVENTS: NAEO, PTT therapeutic    SUBJECTIVE / INTERVAL HPI: Patient seen and examined at bedside. No new neurological deficits.    VITAL SIGNS:  Vital Signs Last 24 Hrs  T(C): 37 (15 Apr 2018 09:12), Max: 37.6 (14 Apr 2018 18:00)  T(F): 98.6 (15 Apr 2018 09:12), Max: 99.7 (14 Apr 2018 18:00)  HR: 56 (15 Apr 2018 11:19) (54 - 62)  BP: 139/84 (15 Apr 2018 11:19) (130/83 - 146/87)  BP(mean): 105 (15 Apr 2018 11:19) (100 - 109)  RR: 18 (15 Apr 2018 11:19) (18 - 19)  SpO2: 97% (15 Apr 2018 11:19) (96% - 98%)    PHYSICAL EXAM:  General: middle-aged male lying in bed, appears comfortable, in NAD  HEENT: EOMI, anicteric, R facial droop, able to move bilateral eyebrows R>L  Neck: supple, no LAD  Cardiovascular: +S1/S2; RRR  Respiratory: CTA B/L; no W/R/R  Gastrointestinal: soft, NT/ND; +BSx4  Extremities: WWP; no edema, clubbing or cyanosis  Vascular: 2+ radial, DP/PT pulses B/L  Neurological: AOx3; improved but somewhat slurred speech, R facial droop, able to move bilateral eyebrows L>R, 5/5 strength LUE and LLE, 2/5 RUE strength, 5/5 RLE strength      MEDICATIONS:  MEDICATIONS  (STANDING):  aspirin  chewable 81 milliGRAM(s) Oral daily  atorvastatin 80 milliGRAM(s) Oral at bedtime  docusate sodium 100 milliGRAM(s) Oral daily  donepezil 5 milliGRAM(s) Oral at bedtime  escitalopram 5 milliGRAM(s) Oral daily  heparin  Infusion 1200 Unit(s)/Hr (12 mL/Hr) IV Continuous <Continuous>  senna 1 Tablet(s) Oral daily    MEDICATIONS  (PRN):  acetaminophen   Tablet 650 milliGRAM(s) Oral every 6 hours PRN For Temp greater than 38 C (100.4 F)  acetaminophen   Tablet. 650 milliGRAM(s) Oral every 6 hours PRN Mild Pain (1 - 3)  clonazePAM Tablet 0.5 milliGRAM(s) Oral at bedtime PRN insomnia  polyethylene glycol 3350 17 Gram(s) Oral daily PRN Constipation      ALLERGIES:  Allergies    Allergy Status Unknown    Intolerances        LABS:          PTT - ( 15 Apr 2018 07:31 )  PTT:76.5 sec    CAPILLARY BLOOD GLUCOSE          RADIOLOGY & ADDITIONAL TESTS: Reviewed. CC: Patient is a 52y old  Male who presents with a chief complaint of CVA (07 Apr 2018 16:49)    OVERNIGHT EVENTS: NAEO, PTT therapeutic    SUBJECTIVE / INTERVAL HPI: Patient seen and examined at bedside. No new neurological deficits.    VITAL SIGNS:  Vital Signs Last 24 Hrs  T(C): 37 (15 Apr 2018 09:12), Max: 37.6 (14 Apr 2018 18:00)  T(F): 98.6 (15 Apr 2018 09:12), Max: 99.7 (14 Apr 2018 18:00)  HR: 56 (15 Apr 2018 11:19) (54 - 62)  BP: 139/84 (15 Apr 2018 11:19) (130/83 - 146/87)  BP(mean): 105 (15 Apr 2018 11:19) (100 - 109)  RR: 18 (15 Apr 2018 11:19) (18 - 19)  SpO2: 97% (15 Apr 2018 11:19) (96% - 98%)    PHYSICAL EXAM:  General: middle-aged male lying in bed, appears comfortable, in NAD  HEENT: EOMI, anicteric, R facial droop, able to move bilateral eyebrows R>L  Neck: supple, no LAD  Cardiovascular: +S1/S2; RRR  Respiratory: CTA B/L; no W/R/R  Gastrointestinal: soft, NT/ND; +BSx4  Extremities: WWP; no edema, clubbing or cyanosis  Vascular: 2+ radial, DP/PT pulses B/L  Neurological: AOx3; improved but somewhat slurred speech, R facial droop, able to move bilateral eyebrows L>R, 5/5 strength LUE and LLE, 2/5 RUE strength, 5/5 RLE strength      MEDICATIONS:  MEDICATIONS  (STANDING):  aspirin  chewable 81 milliGRAM(s) Oral daily  atorvastatin 80 milliGRAM(s) Oral at bedtime  docusate sodium 100 milliGRAM(s) Oral daily  donepezil 5 milliGRAM(s) Oral at bedtime  escitalopram 5 milliGRAM(s) Oral daily  heparin  Infusion 1200 Unit(s)/Hr (12 mL/Hr) IV Continuous <Continuous>  senna 1 Tablet(s) Oral daily    MEDICATIONS  (PRN):  acetaminophen   Tablet 650 milliGRAM(s) Oral every 6 hours PRN For Temp greater than 38 C (100.4 F)  acetaminophen   Tablet. 650 milliGRAM(s) Oral every 6 hours PRN Mild Pain (1 - 3)  clonazePAM Tablet 0.5 milliGRAM(s) Oral at bedtime PRN insomnia  polyethylene glycol 3350 17 Gram(s) Oral daily PRN Constipation      ALLERGIES:  Allergies    Allergy Status Unknown    LABS:  PTT - ( 15 Apr 2018 07:31 )  PTT:76.5 sec    RADIOLOGY & ADDITIONAL TESTS: Reviewed.

## 2018-04-16 LAB
APTT BLD: 39.2 SEC — HIGH (ref 27.5–37.4)
APTT BLD: 80.3 SEC — HIGH (ref 27.5–37.4)
APTT BLD: 86.4 SEC — HIGH (ref 27.5–37.4)
APTT BLD: >200 SEC — CRITICAL HIGH (ref 27.5–37.4)

## 2018-04-16 PROCEDURE — 99233 SBSQ HOSP IP/OBS HIGH 50: CPT

## 2018-04-16 RX ORDER — CLONAZEPAM 1 MG
0.5 TABLET ORAL AT BEDTIME
Qty: 0 | Refills: 0 | Status: DISCONTINUED | OUTPATIENT
Start: 2018-04-16 | End: 2018-04-16

## 2018-04-16 RX ADMIN — HEPARIN SODIUM 12 UNIT(S)/HR: 5000 INJECTION INTRAVENOUS; SUBCUTANEOUS at 06:42

## 2018-04-16 RX ADMIN — Medication 81 MILLIGRAM(S): at 12:30

## 2018-04-16 RX ADMIN — HEPARIN SODIUM 12 UNIT(S)/HR: 5000 INJECTION INTRAVENOUS; SUBCUTANEOUS at 19:18

## 2018-04-16 RX ADMIN — ATORVASTATIN CALCIUM 80 MILLIGRAM(S): 80 TABLET, FILM COATED ORAL at 21:58

## 2018-04-16 RX ADMIN — Medication 100 MILLIGRAM(S): at 11:49

## 2018-04-16 RX ADMIN — SENNA PLUS 1 TABLET(S): 8.6 TABLET ORAL at 11:49

## 2018-04-16 RX ADMIN — DONEPEZIL HYDROCHLORIDE 5 MILLIGRAM(S): 10 TABLET, FILM COATED ORAL at 21:58

## 2018-04-16 RX ADMIN — ESCITALOPRAM OXALATE 5 MILLIGRAM(S): 10 TABLET, FILM COATED ORAL at 11:49

## 2018-04-16 NOTE — PROGRESS NOTE ADULT - ASSESSMENT
51yo M with no PMH presenting with acute left M2 occlusion from [facility] (s/p tPA), now s/p thrombectomy, s/p BRANDON showing large L atrial appendage mass, on heparin gtt.

## 2018-04-16 NOTE — PROGRESS NOTE ADULT - SUBJECTIVE AND OBJECTIVE BOX
CC: Patient is a 52y old  Male who presents with a chief complaint of CVA (07 Apr 2018 16:49)    OVERNIGHT EVENTS: NAEO    SUBJECTIVE / INTERVAL HPI: Patient seen and examined at bedside. Resting comfortably with no acute events. Denies any fevers, chills or pain.     VITAL SIGNS:  Vital Signs Last 24 Hrs  T(C): 36.8 (16 Apr 2018 06:00), Max: 37 (15 Apr 2018 09:12)  T(F): 98.2 (16 Apr 2018 06:00), Max: 98.6 (15 Apr 2018 09:12)  HR: 60 (16 Apr 2018 04:59) (56 - 62)  BP: 132/80 (16 Apr 2018 04:59) (122/73 - 139/84)  BP(mean): 100 (16 Apr 2018 04:59) (92 - 105)  RR: 16 (16 Apr 2018 04:59) (16 - 18)  SpO2: 95% (16 Apr 2018 04:59) (95% - 97%)    PHYSICAL EXAM:    General: middle-aged male lying in bed, appears comfortable, in NAD  HEENT: EOMI, anicteric, R facial droop, able to move bilateral eyebrows R>L  Neck: supple, no LAD  Cardiovascular: +S1/S2; RRR  Respiratory: CTA B/L; no W/R/R  Gastrointestinal: soft, NT/ND; +BSx4  Extremities: WWP; no edema, clubbing or cyanosis  Vascular: 2+ radial, DP/PT pulses B/L  Neurological: AOx3; improved but somewhat slurred speech, R facial droop, able to move bilateral eyebrows L>R, 5/5 strength LUE and LLE, 2/5 RUE strength, 5/5 RLE strength      MEDICATIONS:  MEDICATIONS  (STANDING):  aspirin  chewable 81 milliGRAM(s) Oral daily  atorvastatin 80 milliGRAM(s) Oral at bedtime  docusate sodium 100 milliGRAM(s) Oral daily  donepezil 5 milliGRAM(s) Oral at bedtime  escitalopram 5 milliGRAM(s) Oral daily  heparin  Infusion 1200 Unit(s)/Hr (12 mL/Hr) IV Continuous <Continuous>  senna 1 Tablet(s) Oral daily    MEDICATIONS  (PRN):  acetaminophen   Tablet 650 milliGRAM(s) Oral every 6 hours PRN For Temp greater than 38 C (100.4 F)  acetaminophen   Tablet. 650 milliGRAM(s) Oral every 6 hours PRN Mild Pain (1 - 3)  clonazePAM Tablet 0.5 milliGRAM(s) Oral at bedtime PRN insomnia  polyethylene glycol 3350 17 Gram(s) Oral daily PRN Constipation      ALLERGIES:  Allergies    Allergy Status Unknown    Intolerances        LABS:          PTT - ( 16 Apr 2018 07:45 )  PTT:>200.0 sec    CAPILLARY BLOOD GLUCOSE          RADIOLOGY & ADDITIONAL TESTS: Reviewed. 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 Pt taken to [facility], where stroke code was called with CT head showing left M2 occlusion, no large territory infarct, given tPA and transferred to [facility] for thrombectomy. Pt found to be aphasic, R facial droop, R hemiplegia and left gaze preference with small hematoma over R eye. CTA head showed left M2 occlusion and taken for thrombectomy. Post-thrombectomy, repeat CT showed no hemorrhagic conversion. PT evaluated patient and deemed appropriate for acute rehab. BRANDON showed large L atrial appendage thrombus, for which pt was started on heparin gtt. Hypercoagulability workup showed anticardiolipin Ab+ and heterogeneous MTHFR gene mutation. For repeat BRANDON on Wed 4/18 with discussions w/ CT surgery for possible intervention.    CC: Patient is a 52y old  Male who presents with a chief complaint of CVA (07 Apr 2018 16:49)    OVERNIGHT EVENTS: NAEO    SUBJECTIVE / INTERVAL HPI: Patient seen and examined at bedside. Resting comfortably with no acute events. Denies any fevers, chills or pain.     VITAL SIGNS:  Vital Signs Last 24 Hrs  T(C): 36.8 (16 Apr 2018 06:00), Max: 37 (15 Apr 2018 09:12)  T(F): 98.2 (16 Apr 2018 06:00), Max: 98.6 (15 Apr 2018 09:12)  HR: 60 (16 Apr 2018 04:59) (56 - 62)  BP: 132/80 (16 Apr 2018 04:59) (122/73 - 139/84)  BP(mean): 100 (16 Apr 2018 04:59) (92 - 105)  RR: 16 (16 Apr 2018 04:59) (16 - 18)  SpO2: 95% (16 Apr 2018 04:59) (95% - 97%)    PHYSICAL EXAM:    General: middle-aged male lying in bed, appears comfortable, in NAD  HEENT: EOMI, anicteric, R facial droop, able to move bilateral eyebrows R>L  Neck: supple, no LAD  Cardiovascular: +S1/S2; RRR  Respiratory: CTA B/L; no W/R/R  Gastrointestinal: soft, NT/ND; +BSx4  Extremities: WWP; no edema, clubbing or cyanosis  Vascular: 2+ radial, DP/PT pulses B/L  Neurological: AOx3; improved but somewhat slurred speech, R facial droop, able to move bilateral eyebrows L>R, 5/5 strength LUE and LLE, 2/5 RUE strength, 5/5 RLE strength      MEDICATIONS:  MEDICATIONS  (STANDING):  aspirin  chewable 81 milliGRAM(s) Oral daily  atorvastatin 80 milliGRAM(s) Oral at bedtime  docusate sodium 100 milliGRAM(s) Oral daily  donepezil 5 milliGRAM(s) Oral at bedtime  escitalopram 5 milliGRAM(s) Oral daily  heparin  Infusion 1200 Unit(s)/Hr (12 mL/Hr) IV Continuous <Continuous>  senna 1 Tablet(s) Oral daily    MEDICATIONS  (PRN):  acetaminophen   Tablet 650 milliGRAM(s) Oral every 6 hours PRN For Temp greater than 38 C (100.4 F)  acetaminophen   Tablet. 650 milliGRAM(s) Oral every 6 hours PRN Mild Pain (1 - 3)  clonazePAM Tablet 0.5 milliGRAM(s) Oral at bedtime PRN insomnia  polyethylene glycol 3350 17 Gram(s) Oral daily PRN Constipation      ALLERGIES:  Allergies    Allergy Status Unknown    Intolerances        LABS:          PTT - ( 16 Apr 2018 07:45 )  PTT:>200.0 sec    CAPILLARY BLOOD GLUCOSE          RADIOLOGY & ADDITIONAL TESTS: Reviewed.

## 2018-04-16 NOTE — PROGRESS NOTE ADULT - PROBLEM SELECTOR PLAN 1
Pt with stroke code at [facility], where CT head showed left M2 occlusion with R-sided deficits (hemiplegia, R facial droop), CTA showing same L M2 occlusion, now s/p thrombectomy. Heme/onc on board to evaluate coagulopathy.  - c/w Lipitor 80mg  - c/w aspirin 300mg rectal daily  - A1c 5.1, cholesterol 210, HDL 65, LDL 90,   - permissive HTN -160s  - neuro checks q4h for hemorrhagic conversion  - Heme/onc Dr. Rodriguez following, f/u recs (+anticardiolipin Ab): off Lovenox and Coumadin  - US lower extremity dopplers neg for DVT  - TTE (4/6): EF 60%, trace TR, MR, [place]; no interatrial shunt  - BRANDON showed 2cm L atrial appendage mass, on heparin gtt  - Lexapro 5mg daily for motor recovery  - Aricept 5mg PO daily at bedtime for neurosignaling s/p stroke  - pending repeat BRANDON on 4/18 (wednesday)

## 2018-04-17 LAB — APTT BLD: 71 SEC — HIGH (ref 27.5–37.4)

## 2018-04-17 PROCEDURE — 99232 SBSQ HOSP IP/OBS MODERATE 35: CPT

## 2018-04-17 PROCEDURE — 99231 SBSQ HOSP IP/OBS SF/LOW 25: CPT

## 2018-04-17 RX ADMIN — ESCITALOPRAM OXALATE 5 MILLIGRAM(S): 10 TABLET, FILM COATED ORAL at 11:51

## 2018-04-17 RX ADMIN — Medication 100 MILLIGRAM(S): at 11:51

## 2018-04-17 RX ADMIN — SENNA PLUS 1 TABLET(S): 8.6 TABLET ORAL at 11:52

## 2018-04-17 RX ADMIN — Medication 81 MILLIGRAM(S): at 11:51

## 2018-04-17 RX ADMIN — DONEPEZIL HYDROCHLORIDE 5 MILLIGRAM(S): 10 TABLET, FILM COATED ORAL at 22:01

## 2018-04-17 RX ADMIN — ATORVASTATIN CALCIUM 80 MILLIGRAM(S): 80 TABLET, FILM COATED ORAL at 22:01

## 2018-04-17 NOTE — PROGRESS NOTE ADULT - ASSESSMENT
51yo M with no PMH presenting with acute left M2 occlusion from [facility] (s/p tPA), now s/p thrombectomy, s/p BRANDON showing large L atrial appendage mass, on heparin gtt.

## 2018-04-17 NOTE — PROGRESS NOTE ADULT - PROBLEM SELECTOR PLAN 7
VTE ppx: heparin gtt  GI ppx: Colace, senna, Miralax PRN  ISS    Code status: FULL  Dispo: 7La, acute rehab on discharge

## 2018-04-17 NOTE — PROGRESS NOTE ADULT - SUBJECTIVE AND OBJECTIVE BOX
CC: Patient is a 52y old  Male who presents with a chief complaint of CVA (07 Apr 2018 16:49)    OVERNIGHT EVENTS: NAEO, PTT WNL.    SUBJECTIVE / INTERVAL HPI: Patient seen and examined at bedside. Resting comfortably in bed, in NAD. No complaints this AM.    VITAL SIGNS:  Vital Signs Last 24 Hrs  T(C): 36.7 (17 Apr 2018 06:00), Max: 37.1 (16 Apr 2018 17:24)  T(F): 98.1 (17 Apr 2018 06:00), Max: 98.7 (16 Apr 2018 17:24)  HR: 62 (17 Apr 2018 03:30) (58 - 68)  BP: 135/81 (17 Apr 2018 03:30) (122/63 - 137/68)  BP(mean): 101 (17 Apr 2018 03:30) (85 - 101)  RR: 16 (17 Apr 2018 03:30) (16 - 18)  SpO2: 97% (17 Apr 2018 03:30) (97% - 97%)    PHYSICAL EXAM:    General: middle-aged male lying in bed, appears comfortable, in NAD  HEENT: EOMI, anicteric, R facial droop, able to move bilateral eyebrows R>L  Neck: supple, no LAD  Cardiovascular: +S1/S2; RRR  Respiratory: CTA B/L; no W/R/R  Gastrointestinal: soft, NT/ND; +BSx4  Extremities: WWP; no edema, clubbing or cyanosis  Vascular: 2+ radial, DP/PT pulses B/L  Neurological: AOx3; improved but somewhat slurred speech, R facial droop, able to move bilateral eyebrows L>R, 5/5 strength LUE and LLE, 2/5 RUE strength, 5/5 RLE strength    MEDICATIONS:  MEDICATIONS  (STANDING):  aspirin  chewable 81 milliGRAM(s) Oral daily  atorvastatin 80 milliGRAM(s) Oral at bedtime  docusate sodium 100 milliGRAM(s) Oral daily  donepezil 5 milliGRAM(s) Oral at bedtime  escitalopram 5 milliGRAM(s) Oral daily  heparin  Infusion 1200 Unit(s)/Hr (12 mL/Hr) IV Continuous <Continuous>  senna 1 Tablet(s) Oral daily    MEDICATIONS  (PRN):  acetaminophen   Tablet 650 milliGRAM(s) Oral every 6 hours PRN For Temp greater than 38 C (100.4 F)  acetaminophen   Tablet. 650 milliGRAM(s) Oral every 6 hours PRN Mild Pain (1 - 3)  clonazePAM Tablet 0.5 milliGRAM(s) Oral at bedtime PRN insomnia  polyethylene glycol 3350 17 Gram(s) Oral daily PRN Constipation      ALLERGIES:  Allergies    Allergy Status Unknown    Intolerances        LABS:          PTT - ( 17 Apr 2018 07:26 )  PTT:71.0 sec    CAPILLARY BLOOD GLUCOSE          RADIOLOGY & ADDITIONAL TESTS: Reviewed. 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 Pt taken to [facility], where stroke code was called with CT head showing left M2 occlusion, no large territory infarct, given tPA and transferred to [facility] for thrombectomy. Pt found to be aphasic, R facial droop, R hemiplegia and left gaze preference with small hematoma over R eye. CTA head showed left M2 occlusion and taken for thrombectomy. Post-thrombectomy, repeat CT showed no hemorrhagic conversion. PT evaluated patient and deemed appropriate for acute rehab. BRANDON showed large L atrial appendage thrombus, for which pt was started on heparin gtt. Hypercoagulability workup showed anticardiolipin Ab+ and heterogeneous MTHFR gene mutation. For repeat BRANDON on Wed 4/18 with discussions w/ CT surgery for possible intervention.    CC: Patient is a 52y old  Male who presents with a chief complaint of CVA (07 Apr 2018 16:49)    OVERNIGHT EVENTS: NAEO, PTT WNL.    SUBJECTIVE / INTERVAL HPI: Patient seen and examined at bedside. Resting comfortably in bed, in NAD. No complaints this AM.    VITAL SIGNS:  Vital Signs Last 24 Hrs  T(C): 36.7 (17 Apr 2018 06:00), Max: 37.1 (16 Apr 2018 17:24)  T(F): 98.1 (17 Apr 2018 06:00), Max: 98.7 (16 Apr 2018 17:24)  HR: 62 (17 Apr 2018 03:30) (58 - 68)  BP: 135/81 (17 Apr 2018 03:30) (122/63 - 137/68)  BP(mean): 101 (17 Apr 2018 03:30) (85 - 101)  RR: 16 (17 Apr 2018 03:30) (16 - 18)  SpO2: 97% (17 Apr 2018 03:30) (97% - 97%)    PHYSICAL EXAM:    General: middle-aged male lying in bed, appears comfortable, in NAD  HEENT: EOMI, anicteric, R facial droop, able to move bilateral eyebrows R>L  Neck: supple, no LAD  Cardiovascular: +S1/S2; RRR  Respiratory: CTA B/L; no W/R/R  Gastrointestinal: soft, NT/ND; +BSx4  Extremities: WWP; no edema, clubbing or cyanosis  Vascular: 2+ radial, DP/PT pulses B/L  Neurological: AOx3; improved but somewhat slurred speech, R facial droop, able to move bilateral eyebrows L>R, 5/5 strength LUE and LLE, 2/5 RUE strength, 5/5 RLE strength    MEDICATIONS:  MEDICATIONS  (STANDING):  aspirin  chewable 81 milliGRAM(s) Oral daily  atorvastatin 80 milliGRAM(s) Oral at bedtime  docusate sodium 100 milliGRAM(s) Oral daily  donepezil 5 milliGRAM(s) Oral at bedtime  escitalopram 5 milliGRAM(s) Oral daily  heparin  Infusion 1200 Unit(s)/Hr (12 mL/Hr) IV Continuous <Continuous>  senna 1 Tablet(s) Oral daily    MEDICATIONS  (PRN):  acetaminophen   Tablet 650 milliGRAM(s) Oral every 6 hours PRN For Temp greater than 38 C (100.4 F)  acetaminophen   Tablet. 650 milliGRAM(s) Oral every 6 hours PRN Mild Pain (1 - 3)  clonazePAM Tablet 0.5 milliGRAM(s) Oral at bedtime PRN insomnia  polyethylene glycol 3350 17 Gram(s) Oral daily PRN Constipation      ALLERGIES:  Allergies    Allergy Status Unknown    Intolerances        LABS:          PTT - ( 17 Apr 2018 07:26 )  PTT:71.0 sec    CAPILLARY BLOOD GLUCOSE          RADIOLOGY & ADDITIONAL TESTS: Reviewed.

## 2018-04-17 NOTE — CHART NOTE - NSCHARTNOTEFT_GEN_A_CORE
Admitting Diagnosis:   Patient is a 52y old  Male who presents with a chief complaint of CVA (07 Apr 2018 16:49)      PAST MEDICAL & SURGICAL HISTORY:  Cerebrovascular accident (CVA) due to occlusion of left middle cerebral artery: Left M2 occlusion  Irregular heart beat      Current Nutrition Order:  DASH/TLC    NPO MN 4/17    PO Intake: Good (%) [   ]  Fair (50-75%) [ X  ] Poor (<25%) [   ]    GI Issues: No N/V/C/D reported at this time. Last BM 4/16    Pain: No pain endorsed at this time     Skin Integrity: Vidal 19, intact      Labs:     No new labs since 4/12    CAPILLARY BLOOD GLUCOSE        Medications:  MEDICATIONS  (STANDING):  aspirin  chewable 81 milliGRAM(s) Oral daily  atorvastatin 80 milliGRAM(s) Oral at bedtime  docusate sodium 100 milliGRAM(s) Oral daily  donepezil 5 milliGRAM(s) Oral at bedtime  escitalopram 5 milliGRAM(s) Oral daily  heparin  Infusion 1200 Unit(s)/Hr (12 mL/Hr) IV Continuous <Continuous>  senna 1 Tablet(s) Oral daily    MEDICATIONS  (PRN):  acetaminophen   Tablet 650 milliGRAM(s) Oral every 6 hours PRN For Temp greater than 38 C (100.4 F)  acetaminophen   Tablet. 650 milliGRAM(s) Oral every 6 hours PRN Mild Pain (1 - 3)  clonazePAM Tablet 0.5 milliGRAM(s) Oral at bedtime PRN insomnia  polyethylene glycol 3350 17 Gram(s) Oral daily PRN Constipation      Weight: 92.3kg  Daily     Daily     Weight Change: No new weights recorded since admit     Estimated energy needs: ABW used for calculations as pt between % of IBW. Needs estimated for maintenance in adults; adjusted protein needs for s/p CVA  Calories: 25-30 kcal/kg = 0911-1355 kcal/day  Protein: 1.0-1.2 g/kg =  g protein/day  Fluids: 25-30 mL/kg = 3043-9519 mL/day    Subjective: 51 yo/male admitted s/p CVA, thrombectomy and tPA administration. S/p BRANDON showing large L. atrial appendage mass, started on heparin gtt; pending repeat BRANDON on 4/18 to evaluate changes. Pt seen in room, awake, alert, very pleasant, daughter at bedside. Better intake now than at previous visit; feeling well. No N/V/C/D reported at this time. No pain. Pending DC to rehab if atrial mass size decreases. Briefly reviewed DASH diet ed provided at previous visit.     Previous Nutrition Diagnosis:  Increased protein-calorie needs RT increased demand for intake AEB s/p CVA    Active [ X  ]  Resolved [   ]    If resolved, new PES:     Goal: Pt will meet % of EER per day via oral intake     Recommendations:  1. Continue with current diet order; if PO intake appears to be poor, recommend Ensure 1x/day (350 kcal, 20g protein)  2. Encourage PO intake  3. Trend weights   4. Reinforce diet ed    Education: Briefly reviewed DASH diet again    Risk Level: High [   ] Moderate [ X  ] Low [   ].

## 2018-04-17 NOTE — PROGRESS NOTE ADULT - SUBJECTIVE AND OBJECTIVE BOX
HEALTH ISSUES - PROBLEM Dx:  Creatinine elevation: Creatinine elevation  Atrial mass: Atrial mass  Anticardiolipin antibody positive: Anticardiolipin antibody positive  Prophylactic measure: Prophylactic measure  Nutrition, metabolism, and development symptoms: Nutrition, metabolism, and development symptoms  MARIELOS (acute kidney injury): MARIELOS (acute kidney injury)  Palpitations: Palpitations  Cerebrovascular accident (CVA) due to occlusion of left middle cerebral artery: Cerebrovascular accident (CVA) due to occlusion of left middle cerebral artery          CHEMOTHERAPY REGIMEN:        Day:                          Diet:  Protocol:                                    IVF:      MEDICATIONS  (STANDING):  aspirin  chewable 81 milliGRAM(s) Oral daily  atorvastatin 80 milliGRAM(s) Oral at bedtime  docusate sodium 100 milliGRAM(s) Oral daily  donepezil 5 milliGRAM(s) Oral at bedtime  escitalopram 5 milliGRAM(s) Oral daily  heparin  Infusion 1200 Unit(s)/Hr (12 mL/Hr) IV Continuous <Continuous>  senna 1 Tablet(s) Oral daily    MEDICATIONS  (PRN):  acetaminophen   Tablet 650 milliGRAM(s) Oral every 6 hours PRN For Temp greater than 38 C (100.4 F)  acetaminophen   Tablet. 650 milliGRAM(s) Oral every 6 hours PRN Mild Pain (1 - 3)  clonazePAM Tablet 0.5 milliGRAM(s) Oral at bedtime PRN insomnia  polyethylene glycol 3350 17 Gram(s) Oral daily PRN Constipation      Allergies    Allergy Status Unknown    Intolerances        DVT Prophylaxis: [ ] YES [ ] NO      Antibiotics: [ ] YES [ ] NO    Pain Scale (1-10):       Location:    Vital Signs Last 24 Hrs  T(C): 36.7 (17 Apr 2018 18:00), Max: 36.9 (17 Apr 2018 13:21)  T(F): 98.1 (17 Apr 2018 18:00), Max: 98.4 (17 Apr 2018 13:21)  HR: 64 (17 Apr 2018 17:10) (60 - 66)  BP: 129/71 (17 Apr 2018 17:10) (127/77 - 143/90)  BP(mean): 93 (17 Apr 2018 17:10) (93 - 110)  RR: 16 (17 Apr 2018 17:10) (16 - 17)  SpO2: 97% (17 Apr 2018 17:10) (97% - 97%)    Drug Dosing Weight  Height (cm): 180.34 (05 Apr 2018 09:00)  Weight (kg): 92.3 (05 Apr 2018 09:00)  BMI (kg/m2): 28.4 (05 Apr 2018 09:00)  BSA (m2): 2.12 (05 Apr 2018 09:00)     Physical Exam:  · Constitutional	detailed exam  · Constitutional Details	well-developed; well-groomed  · Eyes	EOMI; PERRL; no drainage or redness  · ENMT Comments	dry mucous membranes  · Respiratory	detailed exam  · Respiratory Comments	normal breath sounds at the lung bases bilaterally  · Cardiovascular	Regular rate & rhythm, normal S1, S2; no murmurs, gallops or rubs; no S3, S4  · Abd-Soft non tender  ·Ext-no edema, clubbing or cyanosis    URINARY CATHETER: [ ] YES [ ] NO     LABS:          PTT - ( 17 Apr 2018 07:26 )  PTT:71.0 sec      CULTURES:    RADIOLOGY & ADDITIONAL STUDIES:

## 2018-04-17 NOTE — PROGRESS NOTE ADULT - PROBLEM SELECTOR PLAN 1
Pt with stroke code at [facility], where CT head showed left M2 occlusion with R-sided deficits (hemiplegia, R facial droop), CTA showing same L M2 occlusion, now s/p thrombectomy. Heme/onc on board to evaluate coagulopathy.  - c/w Lipitor 80mg  - c/w aspirin 300mg rectal daily  - A1c 5.1, cholesterol 210, HDL 65, LDL 90,   - permissive HTN -160s  - neuro checks q4h for hemorrhagic conversion  - Heme/onc Dr. Rodriguez following, f/u recs (+anticardiolipin Ab): off Lovenox and Coumadin  - US lower extremity dopplers neg for DVT  - TTE (4/6): EF 60%, trace TR, MR, CT; no interatrial shunt  - BRANDON showed 2cm L atrial appendage mass, on heparin gtt  - Lexapro 5mg daily for motor recovery  - Aricept 5mg PO daily at bedtime for neurosignaling s/p stroke  - pending repeat BRANDON on 4/18 (wednesday)

## 2018-04-18 LAB
ANION GAP SERPL CALC-SCNC: 10 MMOL/L — SIGNIFICANT CHANGE UP (ref 5–17)
APTT BLD: 88.9 SEC — HIGH (ref 27.5–37.4)
BUN SERPL-MCNC: 12 MG/DL — SIGNIFICANT CHANGE UP (ref 7–23)
CALCIUM SERPL-MCNC: 10 MG/DL — SIGNIFICANT CHANGE UP (ref 8.4–10.5)
CHLORIDE SERPL-SCNC: 100 MMOL/L — SIGNIFICANT CHANGE UP (ref 96–108)
CO2 SERPL-SCNC: 26 MMOL/L — SIGNIFICANT CHANGE UP (ref 22–31)
CREAT SERPL-MCNC: 1.42 MG/DL — HIGH (ref 0.5–1.3)
GLUCOSE SERPL-MCNC: 108 MG/DL — HIGH (ref 70–99)
HCT VFR BLD CALC: 44.5 % — SIGNIFICANT CHANGE UP (ref 39–50)
HGB BLD-MCNC: 14.9 G/DL — SIGNIFICANT CHANGE UP (ref 13–17)
MAGNESIUM SERPL-MCNC: 1.7 MG/DL — SIGNIFICANT CHANGE UP (ref 1.6–2.6)
MCHC RBC-ENTMCNC: 28.9 PG — SIGNIFICANT CHANGE UP (ref 27–34)
MCHC RBC-ENTMCNC: 33.5 G/DL — SIGNIFICANT CHANGE UP (ref 32–36)
MCV RBC AUTO: 86.4 FL — SIGNIFICANT CHANGE UP (ref 80–100)
PHOSPHATE SERPL-MCNC: 3.4 MG/DL — SIGNIFICANT CHANGE UP (ref 2.5–4.5)
PLATELET # BLD AUTO: 342 K/UL — SIGNIFICANT CHANGE UP (ref 150–400)
POTASSIUM SERPL-MCNC: 3.8 MMOL/L — SIGNIFICANT CHANGE UP (ref 3.5–5.3)
POTASSIUM SERPL-SCNC: 3.8 MMOL/L — SIGNIFICANT CHANGE UP (ref 3.5–5.3)
RBC # BLD: 5.15 M/UL — SIGNIFICANT CHANGE UP (ref 4.2–5.8)
RBC # FLD: 13.6 % — SIGNIFICANT CHANGE UP (ref 10.3–16.9)
SODIUM SERPL-SCNC: 136 MMOL/L — SIGNIFICANT CHANGE UP (ref 135–145)
WBC # BLD: 5.3 K/UL — SIGNIFICANT CHANGE UP (ref 3.8–10.5)
WBC # FLD AUTO: 5.3 K/UL — SIGNIFICANT CHANGE UP (ref 3.8–10.5)

## 2018-04-18 PROCEDURE — 99232 SBSQ HOSP IP/OBS MODERATE 35: CPT

## 2018-04-18 PROCEDURE — 93320 DOPPLER ECHO COMPLETE: CPT | Mod: 26

## 2018-04-18 PROCEDURE — 93312 ECHO TRANSESOPHAGEAL: CPT | Mod: 26

## 2018-04-18 PROCEDURE — 93325 DOPPLER ECHO COLOR FLOW MAPG: CPT | Mod: 26

## 2018-04-18 RX ORDER — POTASSIUM CHLORIDE 20 MEQ
10 PACKET (EA) ORAL ONCE
Qty: 0 | Refills: 0 | Status: DISCONTINUED | OUTPATIENT
Start: 2018-04-18 | End: 2018-04-18

## 2018-04-18 RX ORDER — POTASSIUM CHLORIDE 20 MEQ
40 PACKET (EA) ORAL ONCE
Qty: 0 | Refills: 0 | Status: COMPLETED | OUTPATIENT
Start: 2018-04-18 | End: 2018-04-18

## 2018-04-18 RX ORDER — MAGNESIUM SULFATE 500 MG/ML
1 VIAL (ML) INJECTION ONCE
Qty: 0 | Refills: 0 | Status: DISCONTINUED | OUTPATIENT
Start: 2018-04-18 | End: 2018-04-19

## 2018-04-18 RX ADMIN — ATORVASTATIN CALCIUM 80 MILLIGRAM(S): 80 TABLET, FILM COATED ORAL at 22:03

## 2018-04-18 RX ADMIN — Medication 40 MILLIEQUIVALENT(S): at 11:57

## 2018-04-18 RX ADMIN — Medication 81 MILLIGRAM(S): at 11:57

## 2018-04-18 RX ADMIN — DONEPEZIL HYDROCHLORIDE 5 MILLIGRAM(S): 10 TABLET, FILM COATED ORAL at 22:03

## 2018-04-18 RX ADMIN — ESCITALOPRAM OXALATE 5 MILLIGRAM(S): 10 TABLET, FILM COATED ORAL at 11:57

## 2018-04-18 RX ADMIN — HEPARIN SODIUM 12 UNIT(S)/HR: 5000 INJECTION INTRAVENOUS; SUBCUTANEOUS at 05:59

## 2018-04-18 NOTE — PROGRESS NOTE ADULT - PROBLEM SELECTOR PLAN 3
BRANDON demonstrated L atrial clot for which patient started on hep gtt as above. Repeat BRANDON demonstrates some improving size of clot/decreasing in size and more notable hypoechogenicity for which suggestive of lysis of clot. As patient remains at significant risk for embolization of clot, patient to remain on hep gtt with continued neuro monitoring.   - c/w hep gtt, PTT goal 60-90  - c/w working with Physical therapy to work with upper body but limited lower body exercises. Patient encouraged out of bed to chair and bedrest otherwise. C/w working with OT/speech.

## 2018-04-18 NOTE — PROGRESS NOTE ADULT - PROBLEM SELECTOR PLAN 2
As above notable anticardiolipin positive antibody for which Heme/Onc (Jennifer) following.   -c/w hep gtt and f/u heme/onc recs

## 2018-04-18 NOTE — PROGRESS NOTE ADULT - PROBLEM SELECTOR PLAN 1
Presented from [facility] after being found down, L MCA stroke s/p TPA and transferred for thrombectomy. Presented from [facility] after being found down, L MCA stroke s/p TPA, CT head notable for M2 occlusion and transferred for thrombectomy. Now s/p thrombectomy. A1c 5.1, HDL 65/LDL 90/ . Hypercoabable work up performed and notable fo + anticardiolipin and MTHFR gene mutation for which heme/onc following. Also of note, echocardiogram significant for left atrial thrombus/mass for which patient remains on hep gtt. Repeat BRANDON demonstrates improving size of clot with noted increasing hypoechogenicity- likely due to lysis of clot (reviewed with cardiology/echo department). As patient remains at significantly increased risk for clot dislodging/emboli patient determined to warrant further management with hep gtt and neuro monitoring. No CT surgical intervention at this time.   - As above, will continue with hep gtt with PTt goal 60-90, checking PTT qD  - C/w lipitor and aspirin 81mg PO and BP goal 140-160  - c/w monitoring q4h neuro checks.   - Heme/onc following for hypercoaguable/ anticardiolipin+/MTHFR, remains on Hep gtt  - c/w lexapro for motor recovery and atricept for neurosignaling s/p CVA  -PT to work with patient on upper body motor and reduce LE use in setting of LA clot concerning for emboli formation. Patient can get out of bed to chair but otherwise bed rest.   -continue to work with speech pathology/OT

## 2018-04-18 NOTE — PROGRESS NOTE ADULT - ASSESSMENT
53yo M with no significant PMH presented initially to [place] after being found down and found to have acute left M2 occlusion s/p tPA transferred from [place] [facility] to [facility] for thrombectomy now s/p thrombectomy. Course complicated as found to have large L atrial appendage mass/clot, on heparin gtt. Pending repeat BRANDON for re-evaluation for potential CT surgery intervention.

## 2018-04-18 NOTE — PROGRESS NOTE ADULT - SUBJECTIVE AND OBJECTIVE BOX
PROGRESS NOTE    CC: CVA, transferred from [place] for thrombectomy. Found to have Left atrial clot/mass on hep gtt.   Overnight Events: LIDA. NPO after midnight for BRANDON.   Interval History: No complaints.  ROS: Denies headaches, dizziness, fevers, chills, cough, congestion, chest pain, palpitations, shortness of breath, abdominal pain, nausea, or vomiting.     OBJECTIVE  Vitals:  T(C): 36.6 (04-18-18 @ 05:00), Max: 36.9 (04-17-18 @ 13:21)  HR: 68 (04-18-18 @ 04:36) (64 - 68)  BP: 128/75 (04-18-18 @ 04:36) (128/75 - 143/90)  RR: 16 (04-18-18 @ 04:36) (16 - 18)  SpO2: 97% (04-18-18 @ 04:36) (96% - 97%)  Wt(kg): --    I/O:  I&O's Summary    17 Apr 2018 07:01  -  18 Apr 2018 07:00  --------------------------------------------------------  IN: 264 mL / OUT: 700 mL / NET: -436 mL        PHYSICAL EXAM:  Appearance: NAD. Speaking in full sentences.   HEENT:  EOMI. PERRL. R side facial droop. Conjunctiva clear b/l. Moist oral mucosa.  Cardiovascular: RRR S1, S2 with no murmurs.  Respiratory: Lungs CTAB.   Back: No midline tenderness or CVA tenderness noted.  Gastrointestinal:  Soft, nontender. Non-distended. Non-rigid.	  Extremities: No edema b/l. No erythema b/l. LE WWP b/l.  Vascular: DP 2+ b/l.  Neurologic:  Alert and awake oriented x3. 5/5 strength in left upper (, elbow flex/ext), 1/5 strength in  2/5 in elbow flex/shoulder abduction. 5/5 strength in lower extremities (hip flex, dorsi/plantar flex) b/l. R side facial droop/ L side rising> R. L side tongue deviation. Difficulty assessing soft palate rise. EOMI. Shoulder shrug intact on L side and diminished on R side. Following commands. Making eye contact.  	  LABS:                        14.9   5.3   )-----------( 342      ( 18 Apr 2018 07:08 )             44.5           PTT - ( 18 Apr 2018 07:08 )  PTT:88.9 sec      RADIOLOGY & ADDITIONAL TESTS:  Reviewed .    MEDICATIONS  (STANDING):  aspirin  chewable 81 milliGRAM(s) Oral daily  atorvastatin 80 milliGRAM(s) Oral at bedtime  docusate sodium 100 milliGRAM(s) Oral daily  donepezil 5 milliGRAM(s) Oral at bedtime  escitalopram 5 milliGRAM(s) Oral daily  heparin  Infusion 1200 Unit(s)/Hr (12 mL/Hr) IV Continuous <Continuous>  senna 1 Tablet(s) Oral daily    MEDICATIONS  (PRN):  acetaminophen   Tablet 650 milliGRAM(s) Oral every 6 hours PRN For Temp greater than 38 C (100.4 F)  acetaminophen   Tablet. 650 milliGRAM(s) Oral every 6 hours PRN Mild Pain (1 - 3)  clonazePAM Tablet 0.5 milliGRAM(s) Oral at bedtime PRN insomnia  polyethylene glycol 3350 17 Gram(s) Oral daily PRN Constipation

## 2018-04-18 NOTE — PROGRESS NOTE ADULT - PROBLEM SELECTOR PLAN 7
VTE: Hep gtt, SCDs    FULL CODE    Dispo: Admitted to [facility] for management of post CVA and hep gtt for Left atrial thrombus. Will remain on hep gtt and neuro monitoring at this time.

## 2018-04-18 NOTE — PROGRESS NOTE ADULT - PROBLEM SELECTOR PLAN 4
Hx of palpitations, EKG notable for NSR with 1st degree AV block- evaluated as outpatient by cardiology. Though now found to have Left atrial thrombus.

## 2018-04-18 NOTE — PROGRESS NOTE ADULT - PROBLEM SELECTOR PLAN 6
F: No IVF, PO intake  E: Replete electrolytes to maintain K>4 and Mg>2  N:  NPO after midnight for BRANDON and restarted DASH Diet as tolerated

## 2018-04-18 NOTE — PROGRESS NOTE ADULT - PROBLEM SELECTOR PLAN 5
Notable Cr elevation to 1.38 on admission and remains between 1.38-1.46- today at 1.42. Unclear baseline. s/p IVF without improvement. Therefore may be an underlying CKD though unclear etiology (based on this GFR- stage 2).  -c/w monitoring Cr and UOP

## 2018-04-19 LAB
ANION GAP SERPL CALC-SCNC: 12 MMOL/L — SIGNIFICANT CHANGE UP (ref 5–17)
APTT BLD: 127.1 SEC — CRITICAL HIGH (ref 27.5–37.4)
APTT BLD: 61.5 SEC — HIGH (ref 27.5–37.4)
APTT BLD: 62.7 SEC — HIGH (ref 27.5–37.4)
BUN SERPL-MCNC: 15 MG/DL — SIGNIFICANT CHANGE UP (ref 7–23)
CALCIUM SERPL-MCNC: 9.7 MG/DL — SIGNIFICANT CHANGE UP (ref 8.4–10.5)
CHLORIDE SERPL-SCNC: 100 MMOL/L — SIGNIFICANT CHANGE UP (ref 96–108)
CO2 SERPL-SCNC: 25 MMOL/L — SIGNIFICANT CHANGE UP (ref 22–31)
CREAT SERPL-MCNC: 1.36 MG/DL — HIGH (ref 0.5–1.3)
GLUCOSE SERPL-MCNC: 100 MG/DL — HIGH (ref 70–99)
MAGNESIUM SERPL-MCNC: 1.6 MG/DL — SIGNIFICANT CHANGE UP (ref 1.6–2.6)
POTASSIUM SERPL-MCNC: 4.3 MMOL/L — SIGNIFICANT CHANGE UP (ref 3.5–5.3)
POTASSIUM SERPL-SCNC: 4.3 MMOL/L — SIGNIFICANT CHANGE UP (ref 3.5–5.3)
SODIUM SERPL-SCNC: 137 MMOL/L — SIGNIFICANT CHANGE UP (ref 135–145)

## 2018-04-19 PROCEDURE — 99233 SBSQ HOSP IP/OBS HIGH 50: CPT

## 2018-04-19 RX ORDER — MAGNESIUM OXIDE 400 MG ORAL TABLET 241.3 MG
400 TABLET ORAL ONCE
Qty: 0 | Refills: 0 | Status: COMPLETED | OUTPATIENT
Start: 2018-04-19 | End: 2018-04-19

## 2018-04-19 RX ORDER — HEPARIN SODIUM 5000 [USP'U]/ML
900 INJECTION INTRAVENOUS; SUBCUTANEOUS
Qty: 25000 | Refills: 0 | Status: DISCONTINUED | OUTPATIENT
Start: 2018-04-19 | End: 2018-04-20

## 2018-04-19 RX ADMIN — ESCITALOPRAM OXALATE 5 MILLIGRAM(S): 10 TABLET, FILM COATED ORAL at 11:02

## 2018-04-19 RX ADMIN — Medication 100 MILLIGRAM(S): at 11:02

## 2018-04-19 RX ADMIN — SENNA PLUS 1 TABLET(S): 8.6 TABLET ORAL at 11:02

## 2018-04-19 RX ADMIN — DONEPEZIL HYDROCHLORIDE 5 MILLIGRAM(S): 10 TABLET, FILM COATED ORAL at 21:17

## 2018-04-19 RX ADMIN — MAGNESIUM OXIDE 400 MG ORAL TABLET 400 MILLIGRAM(S): 241.3 TABLET ORAL at 07:39

## 2018-04-19 RX ADMIN — HEPARIN SODIUM 9 UNIT(S)/HR: 5000 INJECTION INTRAVENOUS; SUBCUTANEOUS at 21:17

## 2018-04-19 RX ADMIN — ATORVASTATIN CALCIUM 80 MILLIGRAM(S): 80 TABLET, FILM COATED ORAL at 21:17

## 2018-04-19 RX ADMIN — Medication 81 MILLIGRAM(S): at 11:02

## 2018-04-19 RX ADMIN — HEPARIN SODIUM 12 UNIT(S)/HR: 5000 INJECTION INTRAVENOUS; SUBCUTANEOUS at 06:08

## 2018-04-19 RX ADMIN — HEPARIN SODIUM 9 UNIT(S)/HR: 5000 INJECTION INTRAVENOUS; SUBCUTANEOUS at 08:04

## 2018-04-19 NOTE — PROGRESS NOTE ADULT - PROBLEM SELECTOR PLAN 3
BRANDON demonstrated L atrial clot for which patient started on hep gtt as above. Repeat BRANDON demonstrates some improving size of clot/decreasing in size and more notable hypoechogenicity for which suggestive of lysis of clot. As patient remains at significant risk for embolization of clot, patient to remain on hep gtt with continued neuro monitoring.   - c/w hep gtt, PTT goal 60-90  - c/w working with Physical therapy to work with upper body but limited lower body exercises. Patient encouraged out of bed to chair and bedrest otherwise. C/w working with OT/speech. BRANDON demonstrated L atrial clot for which patient started on hep gtt as above. Repeat BRANDON demonstrated some improving size of clot/decreasing in size and more notable hypoechogenicity for which suggestive of lysis/breakdown within the clot. As patient remains at significant risk for embolization of clot, plan to continue on hep gtt with continued neuro monitoring.   - c/w hep gtt at 9cc/hr (supra therapeutic this AM) check PTT q6h, PTT goal 60-90  - c/w working with Physical therapy to work with upper body but limited lower body exercises. Patient encouraged out of bed to chair and bedrest otherwise. C/w working with OT/speech. BRANDON demonstrated L atrial clot for which patient started on hep gtt as above. Repeat BRANDON demonstrated some improving size of clot/decreasing in size and more notable hypoechogenicity for which suggestive of lysis/breakdown within the clot. As patient remains at significant risk for embolization of clot, plan to continue on hep gtt with continued neuro monitoring.   - c/w hep gtt at 9cc/hr (supra therapeutic this AM) check PTT q6h at this time with PTT goal 60-90  - c/w working with Physical therapy to work with upper body but limited lower body exercises. Patient encouraged out of bed to chair and bedrest otherwise. C/w working with OT/speech.

## 2018-04-19 NOTE — PROGRESS NOTE ADULT - ASSESSMENT
51yo M with no significant PMH presented initially to [place] after being found down and found to have acute left M2 occlusion s/p tPA transferred from [place] [facility] to [facility] for thrombectomy now s/p thrombectomy. Course complicated as found to have large L atrial appendage mass/clot, on heparin gtt. Pending repeat BRANDON for re-evaluation for potential CT surgery intervention. 51yo M with no significant PMH presented initially to [place] after being found down and found to have acute left M2 occlusion s/p tPA transferred from [place] [facility] to [facility] for thrombectomy now s/p thrombectomy. Course complicated as found to have large L atrial appendage mass/clot, on heparin gtt. Repeat BRANDON suggestive of LA clot breakdown/decrease in size. Therefore plan to continue hep gtt and continued neuro monitoring in setting that patient is high risk for embolization of clot.

## 2018-04-19 NOTE — PROGRESS NOTE ADULT - PROBLEM SELECTOR PLAN 4
Hx of palpitations, EKG notable for NSR with 1st degree AV block- evaluated as outpatient by cardiology. Though now found to have Left atrial thrombus. No current symptoms. Hx of palpitations, EKG notable for NSR with 1st degree AV block- evaluated as outpatient by cardiology. Though now with Left atrial thrombus. No current symptoms.

## 2018-04-19 NOTE — PROGRESS NOTE ADULT - PROBLEM SELECTOR PLAN 1
Presented from [facility] after being found down, L MCA stroke s/p TPA, CT head notable for M2 occlusion and transferred for thrombectomy. A1c 5.1, HDL 65/LDL 90/ . Hypercoabable work up performed and notable for + anticardiolipin and MTHFR gene mutation for which heme/onc following. Also of note, echocardiogram significant for left atrial thrombus/mass for which patient remains on hep gtt. Repeat BRANDON demonstrated improving size of clot with noted increasing hypoechogenicity within the clot suggestive of lysis/breakdown. Patient is at increased risk for clot dislodging/emboli patient determined to warrant further management with hep gtt and neuro monitoring.  - As above, will continue with hep gtt with PTt goal 60-90. Supratherapeutic this AM to 127. Decreased to 9cc/hr and will recheck PTT.   - C/w lipitor and aspirin 81mg PO and BP goal 140-160  - c/w monitoring q4h neuro checks.   - Heme/onc following for hypercoaguable/ anticardiolipin+/MTHFR, remains on Hep gtt  - c/w lexapro for motor recovery and atricept for neurosignaling s/p CVA  -PT to work with patient on upper body motor and reduce LE use in setting of LA clot concerning for emboli formation. Patient can get out of bed to chair but otherwise bed rest.   -continue to work with speech pathology/OT Presented from [facility] after being found down, L MCA stroke s/p TPA, CT head notable for M2 occlusion and transferred for thrombectomy. A1c 5.1, HDL 65/LDL 90/ . Hypercoabable work up performed and notable for + anticardiolipin and MTHFR gene mutation for which heme/onc following. Also of note, echocardiogram significant for left atrial thrombus/mass for which patient remains on hep gtt. Repeat BRANDON demonstrated improving size of clot with noted increasing hypoechogenicity within the clot suggestive of lysis/breakdown. Patient is at increased risk for clot dislodging/emboli patient determined to warrant further management with hep gtt and neuro monitoring.  - As above, will continue with hep gtt with PTt goal 60-90. Supratherapeutic this AM to 127. Decreased to 9cc/hr and will recheck PTT.   - C/w lipitor and aspirin 81mg PO and BP goal 140-160  - c/w monitoring q4h neuro checks.   - Heme/onc following for hypercoaguable/ anticardiolipin+/MTHFR, remains on Hep gtt (adjustments q6h)  - c/w lexapro for motor recovery and atricept for neurosignaling s/p CVA  -PT to work with patient on upper body motor and reduce LE use in setting of LA clot concerning for emboli formation. Patient can get out of bed to chair but otherwise bed rest.   -continue to work with speech pathology/OT

## 2018-04-19 NOTE — PROGRESS NOTE ADULT - ATTENDING COMMENTS
Improved left atrial appendage thrombus  that is smaller in size and looks like has internal lysis.   BRANDON seen with cardiology and discussed with patient and wife.   Cont IV heparin. will need to stay in 7 L for neuro checks and telemetry

## 2018-04-19 NOTE — PROGRESS NOTE ADULT - PROBLEM SELECTOR PLAN 7
VTE: Hep gtt, SCDs    FULL CODE    Dispo: Admitted to [facility] for management of post CVA and hep gtt for Left atrial thrombus. Will remain on hep gtt and neuro monitoring at this time.

## 2018-04-19 NOTE — PROGRESS NOTE ADULT - SUBJECTIVE AND OBJECTIVE BOX
PROGRESS NOTE    CC: L MCA CVA s/p tpa and thrombectomy  Overnight Events: PTT supratherapeutic this AM. Held for 1 hour and decreased to 9cc/hr. Otherwise LIDA.   Interval History: No complaints this AM.   ROS:     OBJECTIVE  Vitals:  T(C): 36.4 (04-19-18 @ 05:00), Max: 37 (04-18-18 @ 22:17)  HR: 56 (04-19-18 @ 04:14) (56 - 64)  BP: 129/78 (04-19-18 @ 04:14) (129/78 - 149/81)  RR: 15 (04-19-18 @ 04:14) (12 - 18)  SpO2: 97% (04-19-18 @ 04:14) (96% - 98%)  Wt(kg): --    I/O:  I&O's Summary    18 Apr 2018 07:01  -  19 Apr 2018 07:00  --------------------------------------------------------  IN: 276 mL / OUT: 875 mL / NET: -599 mL        PHYSICAL EXAM:  Appearance: NAD. Speaking in full sentences.   HEENT:   Conjunctiva clear b/l. Moist oral mucosa.  Cardiovascular: RRR with no murmurs.  Respiratory: Lungs CTAB.   Gastrointestinal:  Soft, nontender. Non-distended. Non-rigid.	  Extremities: No edema b/l. No erythema b/l. LE WWP b/l.  Vascular: DP 2+ b/l.  Neurologic:  Alert and awake. Moving all extremities. Following commands. Making eye contact.  	  LABS:                        14.9   5.3   )-----------( 342      ( 18 Apr 2018 07:08 )             44.5     04-19    137  |  100  |  15  ----------------------------<  100<H>  4.3   |  25  |  1.36<H>    Ca    9.7      19 Apr 2018 05:57  Phos  3.4     04-18  Mg     1.6     04-19      PTT - ( 19 Apr 2018 05:57 )  PTT:127.1 sec      RADIOLOGY & ADDITIONAL TESTS:  Reviewed .    MEDICATIONS  (STANDING):  aspirin  chewable 81 milliGRAM(s) Oral daily  atorvastatin 80 milliGRAM(s) Oral at bedtime  docusate sodium 100 milliGRAM(s) Oral daily  donepezil 5 milliGRAM(s) Oral at bedtime  escitalopram 5 milliGRAM(s) Oral daily  heparin  Infusion 900 Unit(s)/Hr (9 mL/Hr) IV Continuous <Continuous>  magnesium sulfate  IVPB 1 Gram(s) IV Intermittent once  senna 1 Tablet(s) Oral daily    MEDICATIONS  (PRN):  acetaminophen   Tablet 650 milliGRAM(s) Oral every 6 hours PRN For Temp greater than 38 C (100.4 F)  acetaminophen   Tablet. 650 milliGRAM(s) Oral every 6 hours PRN Mild Pain (1 - 3)  clonazePAM Tablet 0.5 milliGRAM(s) Oral at bedtime PRN insomnia  polyethylene glycol 3350 17 Gram(s) Oral daily PRN Constipation      ASSESSMENT:    PLAN: PROGRESS NOTE    CC: L MCA CVA s/p tpa and thrombectomy  Overnight Events: PTT supratherapeutic this AM. Held for 1 hour and decreased to 9cc/hr. Otherwise LIDA.   Interval History: No complaints this AM.   ROS: Denies headaches, dizziness, fevers, chills, chest pain, shortness of breath, abdominal pain, nausea, vomiting.     OBJECTIVE  Vitals:  T(C): 36.4 (04-19-18 @ 05:00), Max: 37 (04-18-18 @ 22:17)  HR: 56 (04-19-18 @ 04:14) (56 - 64)  BP: 129/78 (04-19-18 @ 04:14) (129/78 - 149/81)  RR: 15 (04-19-18 @ 04:14) (12 - 18)  SpO2: 97% (04-19-18 @ 04:14) (96% - 98%)  Wt(kg): --    I/O:  I&O's Summary    18 Apr 2018 07:01  -  19 Apr 2018 07:00  --------------------------------------------------------  IN: 276 mL / OUT: 875 mL / NET: -599 mL        PHYSICAL EXAM:  Appearance: NAD. Speaking in full sentences.   HEENT: PERRL. EOMI. R side facial droop. Conjunctiva clear b/l. Moist oral mucosa.  Cardiovascular: RRR S1, S2 with no murmurs.  Respiratory: Lungs CTAB.   Gastrointestinal:  Soft, nontender. Non-distended. Non-rigid. + Bowel sounds. 	  Extremities: No edema b/l. No erythema b/l. LE WWP b/l.  Vascular: DP 2+ b/l.  Neurologic:  Alert and awake oriented x3. Following commands. Making eye contact. R side facial droop with lower r facial muscle asymmetric compared to left  (Left intact), eyebrow/forehead raise intact b/l. No noted tongue deviation on this exam. Soft palate rise symmetric b/l. 5/5 strength in L upper ( elbow flex/ext) and lower extremity (Hip flex, dorsiplantar flex). 1/5 strength in upper extremity but 5/5 strength in R lower extremity (hip flex, dorsi/plantar flex). Sensation intact bilaterally.   	  LABS:                        14.9   5.3   )-----------( 342      ( 18 Apr 2018 07:08 )             44.5     04-19    137  |  100  |  15  ----------------------------<  100<H>  4.3   |  25  |  1.36<H>    Ca    9.7      19 Apr 2018 05:57  Phos  3.4     04-18  Mg     1.6     04-19      PTT - ( 19 Apr 2018 05:57 )  PTT:127.1 sec      RADIOLOGY & ADDITIONAL TESTS:  Reviewed .    MEDICATIONS  (STANDING):  aspirin  chewable 81 milliGRAM(s) Oral daily  atorvastatin 80 milliGRAM(s) Oral at bedtime  docusate sodium 100 milliGRAM(s) Oral daily  donepezil 5 milliGRAM(s) Oral at bedtime  escitalopram 5 milliGRAM(s) Oral daily  heparin  Infusion 900 Unit(s)/Hr (9 mL/Hr) IV Continuous <Continuous>  magnesium sulfate  IVPB 1 Gram(s) IV Intermittent once  senna 1 Tablet(s) Oral daily    MEDICATIONS  (PRN):  acetaminophen   Tablet 650 milliGRAM(s) Oral every 6 hours PRN For Temp greater than 38 C (100.4 F)  acetaminophen   Tablet. 650 milliGRAM(s) Oral every 6 hours PRN Mild Pain (1 - 3)  clonazePAM Tablet 0.5 milliGRAM(s) Oral at bedtime PRN insomnia  polyethylene glycol 3350 17 Gram(s) Oral daily PRN Constipation

## 2018-04-19 NOTE — PROGRESS NOTE ADULT - PROBLEM SELECTOR PLAN 5
Notable Cr elevation to 1.38 on admission and remains between 1.38-1.46- today at   . Unclear baseline. s/p IVF without improvement. Therefore may be an underlying CKD though unclear etiology (based on this GFR- stage 2).  -c/w monitoring Cr and UOP Notable Cr elevation to 1.38 on admission and remains between 1.38-1.46- today at 1.36. Unclear baseline. s/p IVF without improvement. Therefore may be an underlying CKD though unclear etiology (based on this GFR- stage 2).  -c/w monitoring Cr and UOP Notable Cr elevation to 1.38 on admission and remains between 1.38-1.46- today at 1.36. Unclear baseline. s/p IVF and has remained stable therefore likely underlying CKD though unclear etiology (based on this GFR- stage 2).  -c/w monitoring Cr and UOP

## 2018-04-19 NOTE — PROGRESS NOTE ADULT - PROBLEM SELECTOR PLAN 2
As above notable anticardiolipin positive antibody for which Heme/Onc (Jennifer) following.   -c/w hep gtt and f/u heme/onc recs  -supratherapeutic PTT to 127 and decreased dose to 9cc/hr As above notable anticardiolipin positive antibody for which Heme/Onc (Jennifer) following.   -c/w hep gtt and f/u heme/onc recs  -supratherapeutic PTT to 127 and decreased dose to 9cc/hr, check PTT q6h As above notable anticardiolipin positive antibody for which Heme/Onc (Jennifer) following.   -c/w hep gtt and f/u heme/onc recs  -supratherapeutic PTT to 127 this AM and decreased dose to 9cc/hr, check PTT q6h at this time

## 2018-04-20 LAB
ANION GAP SERPL CALC-SCNC: 9 MMOL/L — SIGNIFICANT CHANGE UP (ref 5–17)
APTT BLD: 46.5 SEC — HIGH (ref 27.5–37.4)
APTT BLD: 60.8 SEC — HIGH (ref 27.5–37.4)
BUN SERPL-MCNC: 16 MG/DL — SIGNIFICANT CHANGE UP (ref 7–23)
CALCIUM SERPL-MCNC: 9.6 MG/DL — SIGNIFICANT CHANGE UP (ref 8.4–10.5)
CHLORIDE SERPL-SCNC: 99 MMOL/L — SIGNIFICANT CHANGE UP (ref 96–108)
CO2 SERPL-SCNC: 25 MMOL/L — SIGNIFICANT CHANGE UP (ref 22–31)
CREAT SERPL-MCNC: 1.34 MG/DL — HIGH (ref 0.5–1.3)
GLUCOSE SERPL-MCNC: 101 MG/DL — HIGH (ref 70–99)
HCT VFR BLD CALC: 43.8 % — SIGNIFICANT CHANGE UP (ref 39–50)
HGB BLD-MCNC: 14.7 G/DL — SIGNIFICANT CHANGE UP (ref 13–17)
MAGNESIUM SERPL-MCNC: 1.7 MG/DL — SIGNIFICANT CHANGE UP (ref 1.6–2.6)
MCHC RBC-ENTMCNC: 28.7 PG — SIGNIFICANT CHANGE UP (ref 27–34)
MCHC RBC-ENTMCNC: 33.6 G/DL — SIGNIFICANT CHANGE UP (ref 32–36)
MCV RBC AUTO: 85.4 FL — SIGNIFICANT CHANGE UP (ref 80–100)
PLATELET # BLD AUTO: 334 K/UL — SIGNIFICANT CHANGE UP (ref 150–400)
POTASSIUM SERPL-MCNC: 4.2 MMOL/L — SIGNIFICANT CHANGE UP (ref 3.5–5.3)
POTASSIUM SERPL-SCNC: 4.2 MMOL/L — SIGNIFICANT CHANGE UP (ref 3.5–5.3)
RBC # BLD: 5.13 M/UL — SIGNIFICANT CHANGE UP (ref 4.2–5.8)
RBC # FLD: 13.4 % — SIGNIFICANT CHANGE UP (ref 10.3–16.9)
SODIUM SERPL-SCNC: 133 MMOL/L — LOW (ref 135–145)
WBC # BLD: 5.7 K/UL — SIGNIFICANT CHANGE UP (ref 3.8–10.5)
WBC # FLD AUTO: 5.7 K/UL — SIGNIFICANT CHANGE UP (ref 3.8–10.5)

## 2018-04-20 PROCEDURE — 99232 SBSQ HOSP IP/OBS MODERATE 35: CPT

## 2018-04-20 RX ORDER — HEPARIN SODIUM 5000 [USP'U]/ML
1100 INJECTION INTRAVENOUS; SUBCUTANEOUS
Qty: 25000 | Refills: 0 | Status: DISCONTINUED | OUTPATIENT
Start: 2018-04-20 | End: 2018-04-25

## 2018-04-20 RX ORDER — ASPIRIN/CALCIUM CARB/MAGNESIUM 324 MG
81 TABLET ORAL DAILY
Qty: 0 | Refills: 0 | Status: DISCONTINUED | OUTPATIENT
Start: 2018-04-20 | End: 2018-04-27

## 2018-04-20 RX ORDER — HEPARIN SODIUM 5000 [USP'U]/ML
1000 INJECTION INTRAVENOUS; SUBCUTANEOUS
Qty: 25000 | Refills: 0 | Status: DISCONTINUED | OUTPATIENT
Start: 2018-04-20 | End: 2018-04-20

## 2018-04-20 RX ORDER — HEPARIN SODIUM 5000 [USP'U]/ML
1000 INJECTION INTRAVENOUS; SUBCUTANEOUS ONCE
Qty: 0 | Refills: 0 | Status: COMPLETED | OUTPATIENT
Start: 2018-04-20 | End: 2018-04-20

## 2018-04-20 RX ADMIN — HEPARIN SODIUM 10 UNIT(S)/HR: 5000 INJECTION INTRAVENOUS; SUBCUTANEOUS at 07:47

## 2018-04-20 RX ADMIN — Medication 81 MILLIGRAM(S): at 14:17

## 2018-04-20 RX ADMIN — HEPARIN SODIUM 11 UNIT(S)/HR: 5000 INJECTION INTRAVENOUS; SUBCUTANEOUS at 14:00

## 2018-04-20 RX ADMIN — SENNA PLUS 1 TABLET(S): 8.6 TABLET ORAL at 11:22

## 2018-04-20 RX ADMIN — HEPARIN SODIUM 1000 UNIT(S): 5000 INJECTION INTRAVENOUS; SUBCUTANEOUS at 14:17

## 2018-04-20 RX ADMIN — ATORVASTATIN CALCIUM 80 MILLIGRAM(S): 80 TABLET, FILM COATED ORAL at 22:01

## 2018-04-20 RX ADMIN — DONEPEZIL HYDROCHLORIDE 5 MILLIGRAM(S): 10 TABLET, FILM COATED ORAL at 22:01

## 2018-04-20 RX ADMIN — HEPARIN SODIUM 11 UNIT(S)/HR: 5000 INJECTION INTRAVENOUS; SUBCUTANEOUS at 20:22

## 2018-04-20 RX ADMIN — Medication 100 MILLIGRAM(S): at 11:21

## 2018-04-20 RX ADMIN — Medication 81 MILLIGRAM(S): at 11:22

## 2018-04-20 RX ADMIN — ESCITALOPRAM OXALATE 5 MILLIGRAM(S): 10 TABLET, FILM COATED ORAL at 11:22

## 2018-04-20 NOTE — PROGRESS NOTE ADULT - ATTENDING COMMENTS
PTT subtherapeutic. With continued large clot will bolus with 1000 units and increase by 1 cc.   Neurologically better with increased speaking and able to almost fully extend his arm at the shoulder and elbow.   Wrist is still only 3-/5    left MCA stroke - cont IV heparin.   bed rest  will repeat BRANDON in AM PTT subtherapeutic. With continued large clot will bolus with 1000 units and increase by 1 cc.   Neurologically better with increased speaking and able to almost fully extend his arm at the shoulder and elbow.   Wrist is still only 3-/5    left MCA stroke - cont IV heparin.   bed rest  will repeat BRANDON next week  q2 h neuro checks until patient therapeutic.   Patient needs close monitoring on 7 L over the weekend.

## 2018-04-20 NOTE — PROGRESS NOTE ADULT - PROBLEM SELECTOR PLAN 7
VTE: Hep gtt, SCDs    FULL CODE    Dispo: Admitted to [facility] for management of post CVA and hep gtt for Left atrial thrombus. Will remain on hep gtt and neuro monitoring at this time.

## 2018-04-20 NOTE — PROGRESS NOTE ADULT - PROBLEM SELECTOR PLAN 5
Notable Cr elevation to 1.38 on admission and remains between 1.38-1.46- today at 1.36. Unclear baseline. s/p IVF and has remained stable therefore likely underlying CKD though unclear etiology (based on this GFR- stage 2).  -c/w monitoring Cr and UOP

## 2018-04-20 NOTE — PROGRESS NOTE ADULT - PROBLEM SELECTOR PLAN 2
As above notable anticardiolipin positive antibody for which Heme/Onc (Jennifer) following.   -c/w hep gtt and f/u heme/onc recs  -subtherapeutic to 45 this AM and increased to 10cc/hr, check PTT q6h until therapeutic x3 As above notable anticardiolipin positive antibody for which Heme/Onc (Jeninfer) following.   -c/w hep gtt and f/u heme/onc recs  -subtherapeutic to 45 this AM and increased to 10cc/hr, check PTT q6h until therapeutic x3. If not therapeutic on repeat will increase dose, bolus hep and increase frequency of neuro checks.

## 2018-04-20 NOTE — PROGRESS NOTE ADULT - PROBLEM SELECTOR PLAN 1
Presented from [facility] after being found down, L MCA stroke s/p TPA, CT head notable for M2 occlusion and transferred for thrombectomy. A1c 5.1, HDL 65/LDL 90/ . Hypercoagulable work up performed and notable for + anticardiolipin and MTHFR gene mutation for which heme/onc following. Also of note, echocardiogram significant for left atrial thrombus/mass for which patient remains on hep gtt. Repeat BRANDON demonstrated improving size of clot with noted increasing hypoechogenicity within the clot suggestive of lysis/breakdown. Patient is at increased risk for clot dislodging/emboli patient determined to warrant further management with hep gtt and neuro monitoring.  - As above, will continue with hep gtt with PTt goal 60-90. Subtherapeutic this AM- increased hep gtt to 10cc/hr. Repeat PTT at noon- c/w q6h at this time until therapeutic x3.   - C/w lipitor and aspirin 81mg PO and BP goal 140-160  - c/w monitoring q4h neuro checks.   - Heme/onc following for hypercoaguable/ anticardiolipin+/MTHFR, remains on Hep gtt   - c/w lexapro for motor recovery and atricept for neurosignaling s/p CVA  -PT to work with patient on upper body motor and reduce LE use in setting of LA clot concerning for emboli formation. Patient can get out of bed to chair but otherwise bed rest.   -continue to work with speech pathology/OT Presented from [facility] after being found down, L MCA stroke s/p TPA, CT head notable for M2 occlusion and transferred for thrombectomy. A1c 5.1, HDL 65/LDL 90/ . Hypercoagulable work up performed and notable for + anticardiolipin and MTHFR gene mutation for which heme/onc following. Also of note, echocardiogram significant for left atrial thrombus/mass for which patient remains on hep gtt. Repeat BRANDON demonstrated improving size of clot with noted increasing hypoechogenicity within the clot suggestive of lysis/breakdown. Patient is at increased risk for clot dislodging/emboli patient determined to warrant further management with hep gtt and neuro monitoring.  - As above, will continue with hep gtt with PTt goal 60-90. Subtherapeutic this AM- increased hep gtt to 10cc/hr. Repeat PTT at noon- c/w q6h at this time until therapeutic x3. If not therapeutic on repeat will increase neuro checks and bolus hep and increase dose.   - C/w lipitor and aspirin 81mg PO and BP goal 140-160  - c/w monitoring q4h neuro checks.   - Heme/onc following for hypercoaguable/ anticardiolipin+/MTHFR, remains on Hep gtt   - c/w lexapro for motor recovery and atricept for neurosignaling s/p CVA  -PT to work with patient on upper body motor and reduce LE use in setting of LA clot concerning for emboli formation. Patient can get out of bed to chair but otherwise bed rest.   -continue to work with speech pathology/OT

## 2018-04-20 NOTE — PROGRESS NOTE ADULT - SUBJECTIVE AND OBJECTIVE BOX
PROGRESS NOTE    CC:  Overnight Events:  Interval History:   ROS:    OBJECTIVE  Vitals:  T(C): 37 (04-20-18 @ 09:02), Max: 37.1 (04-19-18 @ 14:11)  HR: 66 (04-20-18 @ 09:16) (54 - 66)  BP: 116/65 (04-20-18 @ 09:16) (116/65 - 145/89)  RR: 18 (04-20-18 @ 09:16) (14 - 18)  SpO2: 96% (04-20-18 @ 09:16) (96% - 98%)  Wt(kg): --    I/O:  I&O's Summary    19 Apr 2018 07:01  -  20 Apr 2018 07:00  --------------------------------------------------------  IN: 108 mL / OUT: 200 mL / NET: -92 mL    20 Apr 2018 07:01  -  20 Apr 2018 09:21  --------------------------------------------------------  IN: 30 mL / OUT: 300 mL / NET: -270 mL        PHYSICAL EXAM:  Appearance: NAD. Speaking in full sentences.   HEENT:  PERRL. EOMI. Conjunctiva clear b/l. Moist oral mucosa.  Cardiovascular: RRR, S1, S2 with no murmurs, rubs or gallops.  Respiratory: Lungs CTA b/l.    Gastrointestinal:  Soft, nontender. Non-distended. Non-rigid. + Bowel sounds	  Extremities: No edema b/l. No erythema b/l. LE WWP b/l.  Vascular: DP 2+ b/l.  Neurologic:  Alert and awake oriented x3. Following commands. Making eye contact. 5/5 strength in left upper (, elbow flex/ext), 1/5 strength in  1/5 in elbow flex/ext 2/5 shoulder abduction. 5/5 strength in lower extremities (hip flex, dorsi/plantar flex) b/l. R side facial droop/ L side rising> R. L side tongue deviation. Difficulty assessing soft palate rise. EOMI. Shoulder shrug intact on L side and diminished on R side.    	  LABS:                        14.7   5.7   )-----------( 334      ( 20 Apr 2018 07:15 )             43.8     04-20    133<L>  |  99  |  16  ----------------------------<  101<H>  4.2   |  25  |  1.34<H>    Ca    9.6      20 Apr 2018 07:15  Mg     1.7     04-20      PTT - ( 20 Apr 2018 07:15 )  PTT:46.5 sec      RADIOLOGY & ADDITIONAL TESTS:  Reviewed .    MEDICATIONS  (STANDING):  aspirin  chewable 81 milliGRAM(s) Oral daily  atorvastatin 80 milliGRAM(s) Oral at bedtime  docusate sodium 100 milliGRAM(s) Oral daily  donepezil 5 milliGRAM(s) Oral at bedtime  escitalopram 5 milliGRAM(s) Oral daily  heparin  Infusion 1000 Unit(s)/Hr (10 mL/Hr) IV Continuous <Continuous>  senna 1 Tablet(s) Oral daily    MEDICATIONS  (PRN):  acetaminophen   Tablet 650 milliGRAM(s) Oral every 6 hours PRN For Temp greater than 38 C (100.4 F)  acetaminophen   Tablet. 650 milliGRAM(s) Oral every 6 hours PRN Mild Pain (1 - 3)  clonazePAM Tablet 0.5 milliGRAM(s) Oral at bedtime PRN insomnia  polyethylene glycol 3350 17 Gram(s) Oral daily PRN Constipation PROGRESS NOTE    CC: L MCA stroke s/p tpa and thromebctomy, LA clot  Overnight Events: LIDA. Subtherapeutic PTT this AM.   Interval History: No complaints this AM.   ROS: Denies headaches, vision changes, fevers, chills, nausea, vomiting, abdominal pain, chest pain, shortness of breath.     OBJECTIVE  Vitals:  T(C): 37 (04-20-18 @ 09:02), Max: 37.1 (04-19-18 @ 14:11)  HR: 66 (04-20-18 @ 09:16) (54 - 66)  BP: 116/65 (04-20-18 @ 09:16) (116/65 - 145/89)  RR: 18 (04-20-18 @ 09:16) (14 - 18)  SpO2: 96% (04-20-18 @ 09:16) (96% - 98%)  Wt(kg): --    I/O:  I&O's Summary    19 Apr 2018 07:01  -  20 Apr 2018 07:00  --------------------------------------------------------  IN: 108 mL / OUT: 200 mL / NET: -92 mL    20 Apr 2018 07:01  -  20 Apr 2018 09:21  --------------------------------------------------------  IN: 30 mL / OUT: 300 mL / NET: -270 mL        PHYSICAL EXAM:  Appearance: NAD. Speaking in full sentences.   HEENT:  PERRL. EOMI. Conjunctiva clear b/l. Moist oral mucosa.  Cardiovascular: RRR, S1, S2 with no murmurs, rubs or gallops.  Respiratory: Lungs CTA b/l.    Gastrointestinal:  Soft, nontender. Non-distended. Non-rigid. + Bowel sounds	  Extremities: No edema b/l. No erythema b/l. LE WWP b/l.  Vascular: DP 2+ b/l.  Neurologic:  Alert and awake oriented x3. Following commands. Making eye contact. 5/5 strength in left upper (, elbow flex/ext), 1/5 strength in  1/5 in elbow flex/ext 2/5 shoulder abduction. 5/5 strength in lower extremities (hip flex, dorsi/plantar flex) b/l. R side facial droop/ L side rising> R. L side tongue deviation. Difficulty assessing soft palate rise. EOMI. Shoulder shrug intact on L side and diminished on R side.    	  LABS:                        14.7   5.7   )-----------( 334      ( 20 Apr 2018 07:15 )             43.8     04-20    133<L>  |  99  |  16  ----------------------------<  101<H>  4.2   |  25  |  1.34<H>    Ca    9.6      20 Apr 2018 07:15  Mg     1.7     04-20      PTT - ( 20 Apr 2018 07:15 )  PTT:46.5 sec      RADIOLOGY & ADDITIONAL TESTS:  Reviewed .    MEDICATIONS  (STANDING):  aspirin  chewable 81 milliGRAM(s) Oral daily  atorvastatin 80 milliGRAM(s) Oral at bedtime  docusate sodium 100 milliGRAM(s) Oral daily  donepezil 5 milliGRAM(s) Oral at bedtime  escitalopram 5 milliGRAM(s) Oral daily  heparin  Infusion 1000 Unit(s)/Hr (10 mL/Hr) IV Continuous <Continuous>  senna 1 Tablet(s) Oral daily    MEDICATIONS  (PRN):  acetaminophen   Tablet 650 milliGRAM(s) Oral every 6 hours PRN For Temp greater than 38 C (100.4 F)  acetaminophen   Tablet. 650 milliGRAM(s) Oral every 6 hours PRN Mild Pain (1 - 3)  clonazePAM Tablet 0.5 milliGRAM(s) Oral at bedtime PRN insomnia  polyethylene glycol 3350 17 Gram(s) Oral daily PRN Constipation

## 2018-04-20 NOTE — PROGRESS NOTE ADULT - ASSESSMENT
51yo M with no significant PMH presented initially to [place] after being found down and found to have acute left M2 occlusion s/p tPA transferred from [facility] to [facility] for thrombectomy now s/p thrombectomy. Course complicated as found to have large L atrial appendage mass/clot, on heparin gtt. Repeat BRANDON suggestive of LA clot breakdown/decrease in size. In the setting that patient is high risk for embolization of clot, continue hep gtt and neuro monitoring.

## 2018-04-20 NOTE — PROGRESS NOTE ADULT - PROBLEM SELECTOR PLAN 4
Hx of palpitations, EKG notable for NSR with 1st degree AV block- evaluated as outpatient by cardiology. Though now with Left atrial thrombus. No current symptoms.

## 2018-04-20 NOTE — PROGRESS NOTE ADULT - PROBLEM SELECTOR PLAN 3
BRANDON demonstrated L atrial clot for which patient started on hep gtt as above. Repeat BRANDON demonstrated some improving size of clot/decreasing in size and more notable hypoechogenicity for which suggestive of lysis/breakdown within the clot. As patient remains at significant risk for embolization of clot, plan to continue on hep gtt with continued neuro monitoring.   - c/w hep gtt at 10cc/hr (subtherapeutic this AM) check PTT q6h at this time with PTT goal 60-90  - c/w working with Physical therapy to work with upper body but limited lower body exercises. Patient encouraged out of bed to chair and bedrest otherwise. C/w working with OT/speech. BRANDON demonstrated L atrial clot for which patient started on hep gtt as above. Repeat BRANDON demonstrated some improving size of clot/decreasing in size and more notable hypoechogenicity for which suggestive of lysis/breakdown within the clot. As patient remains at significant risk for embolization of clot, plan to continue on hep gtt with continued neuro monitoring.   - c/w hep gtt at 10cc/hr (subtherapeutic this AM) check PTT q6h at this time with PTT goal 60-90. Increase frequency of neuro checks if repeat subtherapeutic and bolus/increase hep dose.   - c/w working with Physical therapy to work with upper body but limited lower body exercises. Patient encouraged out of bed to chair and bedrest otherwise. C/w working with OT/speech.

## 2018-04-21 LAB
ANION GAP SERPL CALC-SCNC: 8 MMOL/L — SIGNIFICANT CHANGE UP (ref 5–17)
APTT BLD: 59.8 SEC — HIGH (ref 27.5–37.4)
APTT BLD: 78.3 SEC — HIGH (ref 27.5–37.4)
APTT BLD: 80.6 SEC — HIGH (ref 27.5–37.4)
APTT BLD: 82.3 SEC — HIGH (ref 27.5–37.4)
BUN SERPL-MCNC: 18 MG/DL — SIGNIFICANT CHANGE UP (ref 7–23)
CALCIUM SERPL-MCNC: 9.8 MG/DL — SIGNIFICANT CHANGE UP (ref 8.4–10.5)
CHLORIDE SERPL-SCNC: 100 MMOL/L — SIGNIFICANT CHANGE UP (ref 96–108)
CO2 SERPL-SCNC: 27 MMOL/L — SIGNIFICANT CHANGE UP (ref 22–31)
CREAT SERPL-MCNC: 1.4 MG/DL — HIGH (ref 0.5–1.3)
GLUCOSE SERPL-MCNC: 96 MG/DL — SIGNIFICANT CHANGE UP (ref 70–99)
HCT VFR BLD CALC: 45.6 % — SIGNIFICANT CHANGE UP (ref 39–50)
HGB BLD-MCNC: 15.2 G/DL — SIGNIFICANT CHANGE UP (ref 13–17)
MAGNESIUM SERPL-MCNC: 1.7 MG/DL — SIGNIFICANT CHANGE UP (ref 1.6–2.6)
MCHC RBC-ENTMCNC: 28.9 PG — SIGNIFICANT CHANGE UP (ref 27–34)
MCHC RBC-ENTMCNC: 33.3 G/DL — SIGNIFICANT CHANGE UP (ref 32–36)
MCV RBC AUTO: 86.7 FL — SIGNIFICANT CHANGE UP (ref 80–100)
PLATELET # BLD AUTO: 309 K/UL — SIGNIFICANT CHANGE UP (ref 150–400)
POTASSIUM SERPL-MCNC: 4.3 MMOL/L — SIGNIFICANT CHANGE UP (ref 3.5–5.3)
POTASSIUM SERPL-SCNC: 4.3 MMOL/L — SIGNIFICANT CHANGE UP (ref 3.5–5.3)
RBC # BLD: 5.26 M/UL — SIGNIFICANT CHANGE UP (ref 4.2–5.8)
RBC # FLD: 13.7 % — SIGNIFICANT CHANGE UP (ref 10.3–16.9)
SODIUM SERPL-SCNC: 135 MMOL/L — SIGNIFICANT CHANGE UP (ref 135–145)
WBC # BLD: 5.6 K/UL — SIGNIFICANT CHANGE UP (ref 3.8–10.5)
WBC # FLD AUTO: 5.6 K/UL — SIGNIFICANT CHANGE UP (ref 3.8–10.5)

## 2018-04-21 PROCEDURE — 99232 SBSQ HOSP IP/OBS MODERATE 35: CPT

## 2018-04-21 RX ORDER — MAGNESIUM SULFATE 500 MG/ML
2 VIAL (ML) INJECTION ONCE
Qty: 0 | Refills: 0 | Status: COMPLETED | OUTPATIENT
Start: 2018-04-21 | End: 2018-04-21

## 2018-04-21 RX ADMIN — ESCITALOPRAM OXALATE 5 MILLIGRAM(S): 10 TABLET, FILM COATED ORAL at 12:13

## 2018-04-21 RX ADMIN — ATORVASTATIN CALCIUM 80 MILLIGRAM(S): 80 TABLET, FILM COATED ORAL at 22:20

## 2018-04-21 RX ADMIN — Medication 81 MILLIGRAM(S): at 12:13

## 2018-04-21 RX ADMIN — SENNA PLUS 1 TABLET(S): 8.6 TABLET ORAL at 12:13

## 2018-04-21 RX ADMIN — Medication 100 MILLIGRAM(S): at 12:13

## 2018-04-21 RX ADMIN — DONEPEZIL HYDROCHLORIDE 5 MILLIGRAM(S): 10 TABLET, FILM COATED ORAL at 22:20

## 2018-04-21 RX ADMIN — Medication 50 GRAM(S): at 12:13

## 2018-04-21 NOTE — PROGRESS NOTE ADULT - SUBJECTIVE AND OBJECTIVE BOX
PROGRESS NOTE    CC: L MCA CVA, LA thrombus  Overnight Events: Therapeutic PTT o/n- no adjustments.  Interval History: No complaints.  ROS: Denies, headaches, vision changes, dizziness, weakness, fevers, chills, cough, congestion.     OBJECTIVE  Vitals:  T(C): 36.8 (04-21-18 @ 13:35), Max: 36.9 (04-21-18 @ 05:00)  HR: 68 (04-21-18 @ 12:19) (58 - 72)  BP: 122/63 (04-21-18 @ 12:19) (116/59 - 130/72)  RR: 18 (04-21-18 @ 12:19) (14 - 18)  SpO2: 97% (04-21-18 @ 12:19) (97% - 98%)  Wt(kg): --    I/O:  I&O's Summary    20 Apr 2018 07:01  -  21 Apr 2018 07:00  --------------------------------------------------------  IN: 228 mL / OUT: 600 mL / NET: -372 mL        PHYSICAL EXAM:  Appearance: NAD. Speaking in full sentences.   HEENT:  PERRL. EOMI. Conjunctiva clear b/l. Moist oral mucosa.  Cardiovascular: RRR, S1, S2 with no murmurs, rubs or gallops.  Respiratory: Lungs CTA b/l.    Gastrointestinal:  Soft, nontender. Non-distended. Non-rigid. + Bowel sounds	  Extremities: No edema b/l. No erythema b/l. LE WWP b/l.  Vascular: DP 2+ b/l.  Neurologic:  Alert and awake oriented x3. Following commands. Making eye contact. 5/5 strength in left upper (, elbow flex/ext), 1/5 strength in  1/5 in elbow flex/ext 2/5 shoulder abduction. 5/5 strength in lower extremities (hip flex, dorsi/plantar flex) b/l. R side facial droop/ L side rising> R. L side tongue deviation. Difficulty assessing soft palate rise. EOMI. Shoulder shrug intact on L side and diminished on R side.  	  LABS:                        15.2   5.6   )-----------( 309      ( 21 Apr 2018 06:45 )             45.6     04-21    135  |  100  |  18  ----------------------------<  96  4.3   |  27  |  1.40<H>    Ca    9.8      21 Apr 2018 06:45  Mg     1.7     04-21      PT/INR - ( 20 Apr 2018 13:13 )   PT: 13.3 sec;   INR: 1.19          PTT - ( 21 Apr 2018 12:15 )  PTT:78.3 sec      RADIOLOGY & ADDITIONAL TESTS:  Reviewed .    MEDICATIONS  (STANDING):  aspirin enteric coated 81 milliGRAM(s) Oral daily  atorvastatin 80 milliGRAM(s) Oral at bedtime  docusate sodium 100 milliGRAM(s) Oral daily  donepezil 5 milliGRAM(s) Oral at bedtime  escitalopram 5 milliGRAM(s) Oral daily  heparin  Infusion 1100 Unit(s)/Hr (11 mL/Hr) IV Continuous <Continuous>  senna 1 Tablet(s) Oral daily    MEDICATIONS  (PRN):  acetaminophen   Tablet 650 milliGRAM(s) Oral every 6 hours PRN For Temp greater than 38 C (100.4 F)  acetaminophen   Tablet. 650 milliGRAM(s) Oral every 6 hours PRN Mild Pain (1 - 3)  clonazePAM Tablet 0.5 milliGRAM(s) Oral at bedtime PRN insomnia  polyethylene glycol 3350 17 Gram(s) Oral daily PRN Constipation      ASSESSMENT:    PLAN: PROGRESS NOTE    CC: L MCA CVA, LA thrombus  Overnight Events: Therapeutic PTT o/n- no adjustments.  Interval History: No complaints.  ROS: Denies, headaches, vision changes, dizziness, weakness, fevers, chills, cough, congestion.     OBJECTIVE  Vitals:  T(C): 36.8 (04-21-18 @ 13:35), Max: 36.9 (04-21-18 @ 05:00)  HR: 68 (04-21-18 @ 12:19) (58 - 72)  BP: 122/63 (04-21-18 @ 12:19) (116/59 - 130/72)  RR: 18 (04-21-18 @ 12:19) (14 - 18)  SpO2: 97% (04-21-18 @ 12:19) (97% - 98%)  Wt(kg): --    I/O:  I&O's Summary    20 Apr 2018 07:01  -  21 Apr 2018 07:00  --------------------------------------------------------  IN: 228 mL / OUT: 600 mL / NET: -372 mL    	    PHYSICAL EXAM:  Appearance: NAD. Speaking in full sentences.   HEENT:  PERRL. EOMI. Conjunctiva clear b/l. Moist oral mucosa.  Cardiovascular: RRR, S1, S2 with no murmurs, rubs or gallops.  Respiratory: Lungs CTA b/l.    Gastrointestinal:  Soft, nontender. Non-distended. Non-rigid. + Bowel sounds	  Extremities: No edema b/l. No erythema b/l. LE WWP b/l.  Vascular: DP 2+ b/l.  Neurologic:  Alert and awake oriented x3. Following commands. Making eye contact. 5/5 strength in left upper (, elbow flex/ext), 1/5 strength in  1/5 in elbow flex/ext 2/5 shoulder abduction. 5/5 strength in lower extremities (hip flex, dorsi/plantar flex) b/l. R side facial droop/ L side rising> R. L side tongue deviation. Difficulty assessing soft palate rise. EOMI. Shoulder shrug intact on L side and diminished on R side.  	  LABS:                        15.2   5.6   )-----------( 309      ( 21 Apr 2018 06:45 )             45.6     04-21    135  |  100  |  18  ----------------------------<  96  4.3   |  27  |  1.40<H>    Ca    9.8      21 Apr 2018 06:45  Mg     1.7     04-21      PT/INR - ( 20 Apr 2018 13:13 )   PT: 13.3 sec;   INR: 1.19          PTT - ( 21 Apr 2018 12:15 )  PTT:78.3 sec      RADIOLOGY & ADDITIONAL TESTS:  Reviewed .    MEDICATIONS  (STANDING):  aspirin enteric coated 81 milliGRAM(s) Oral daily  atorvastatin 80 milliGRAM(s) Oral at bedtime  docusate sodium 100 milliGRAM(s) Oral daily  donepezil 5 milliGRAM(s) Oral at bedtime  escitalopram 5 milliGRAM(s) Oral daily  heparin  Infusion 1100 Unit(s)/Hr (11 mL/Hr) IV Continuous <Continuous>  senna 1 Tablet(s) Oral daily    MEDICATIONS  (PRN):  acetaminophen   Tablet 650 milliGRAM(s) Oral every 6 hours PRN For Temp greater than 38 C (100.4 F)  acetaminophen   Tablet. 650 milliGRAM(s) Oral every 6 hours PRN Mild Pain (1 - 3)  clonazePAM Tablet 0.5 milliGRAM(s) Oral at bedtime PRN insomnia  polyethylene glycol 3350 17 Gram(s) Oral daily PRN Constipation      ASSESSMENT:    PLAN:

## 2018-04-21 NOTE — PROGRESS NOTE ADULT - PROBLEM SELECTOR PLAN 5
Notable Cr elevation to 1.38 on admission and remains between 1.38-1.46- today at 1.36. Unclear baseline. s/p IVF and has remained stable therefore likely underlying CKD though unclear etiology (based on this GFR- stage 2).  -c/w monitoring Cr and UOP Notable Cr elevation to 1.38 on admission and remains between 1.38-1.46. Unclear baseline. s/p IVF and has remained stable therefore likely underlying CKD though unclear etiology. CKD stage 2.   -c/w monitoring Cr and UOP. Remains stable- Cr at 1.4 today.

## 2018-04-21 NOTE — PROGRESS NOTE ADULT - PROBLEM SELECTOR PLAN 7
VTE: Hep gtt, SCDs    FULL CODE    Dispo: Admitted to [facility] for management of post CVA and hep gtt for Left atrial thrombus. Will remain on hep gtt and neuro monitoring at this time.

## 2018-04-21 NOTE — PROGRESS NOTE ADULT - PROBLEM SELECTOR PLAN 1
Presented from [facility] after being found down, L MCA stroke s/p TPA, CT head notable for M2 occlusion and transferred for thrombectomy. A1c 5.1, HDL 65/LDL 90/ . Hypercoagulable work up performed and notable for + anticardiolipin and MTHFR gene mutation for which heme/onc following. Also of note, echocardiogram significant for left atrial thrombus/mass for which patient remains on hep gtt. Repeat BRANDON demonstrated improving size of clot with noted increasing hypoechogenicity within the clot suggestive of lysis/breakdown. Patient is at increased risk for clot dislodging/emboli patient determined to warrant further management with hep gtt and neuro monitoring.  - As above, will continue with hep gtt with PTt goal 60-90. Subtherapeutic this AM- increased hep gtt to 10cc/hr. Repeat PTT at noon- c/w q6h at this time until therapeutic x3. If not therapeutic on repeat will increase neuro checks and bolus hep and increase dose.   - C/w lipitor and aspirin 81mg PO and BP goal 140-160  - c/w monitoring q4h neuro checks.   - Heme/onc following for hypercoaguable/ anticardiolipin+/MTHFR, remains on Hep gtt   - c/w lexapro for motor recovery and atricept for neurosignaling s/p CVA  -PT to work with patient on upper body motor and reduce LE use in setting of LA clot concerning for emboli formation. Patient can get out of bed to chair but otherwise bed rest.   -continue to work with speech pathology/OT Presented from [facility] after being found down, L MCA stroke s/p TPA, CT head notable for M2 occlusion and transferred for thrombectomy. A1c 5.1, HDL 65/LDL 90/ . Hypercoagulable work up performed and notable for + anticardiolipin and MTHFR gene mutation for which heme/onc following. Also of note, echocardiogram significant for left atrial thrombus/mass for which patient remains on hep gtt. Repeat BRANDON demonstrated improving size of clot with noted increasing hypoechogenicity within the clot suggestive of lysis/breakdown. Patient is at increased risk for clot dislodging/emboli patient determined to warrant further management with hep gtt and neuro monitoring.  - As above, will continue with hep gtt with PTt goal 60-90. hep gtt noted to be 59 this AM but concern that draw was likely too early, repeat this afternoon 78- therefore will repeat PTT at 6pm.     - C/w lipitor and aspirin 81mg PO and BP goal 140-160  - c/w monitoring q4h neuro checks now that patient remains therapeutic  - Heme/onc following for hypercoaguable/ anticardiolipin+/MTHFR, remains on Hep gtt   - c/w lexapro for motor recovery and atricept for neurosignaling s/p CVA  -PT to work with patient on upper body motor and reduce LE use in setting of LA clot concerning for emboli formation. Patient can get out of bed to chair but otherwise bed rest.   -continue to work with speech pathology/OT  - Improving strength in R upper extremity, especially proximal muscles

## 2018-04-21 NOTE — PROGRESS NOTE ADULT - PROBLEM SELECTOR PLAN 2
As above notable anticardiolipin positive antibody for which Heme/Onc (Jennifer) following.   -c/w hep gtt and f/u heme/onc recs  -subtherapeutic to 45 this AM and increased to 10cc/hr, check PTT q6h until therapeutic x3. If not therapeutic on repeat will increase dose, bolus hep and increase frequency of neuro checks. As above notable anticardiolipin positive antibody for which Heme/Onc (Jennifer) following.   -c/w hep gtt and f/u heme/onc recs  -PTT at 59 this AM though concern that may have been too early- repeated at 12 and 78. Will repeat again.   - Patient has remained therapeutic therefore can decrease frequency of neuro checks.

## 2018-04-21 NOTE — PROGRESS NOTE ADULT - ASSESSMENT
53yo M with no significant PMH presented initially to [place] after being found down and found to have acute left M2 occlusion s/p tPA transferred from [facility] to [facility] for thrombectomy now s/p thrombectomy. Course complicated as found to have large L atrial appendage mass/clot, on heparin gtt. Repeat BRANDON suggestive of LA clot breakdown/decrease in size. In the setting that patient is high risk for embolization of clot, continue hep gtt and neuro monitoring. 51yo M with no significant PMH presented initially to [place] after being found down and found to have acute left M2 occlusion s/p tPA transferred from [place] hospital to [facility] for thrombectomy now s/p thrombectomy. Course complicated as found to have large L atrial a	ppendage mass/clot, on heparin gtt. Repeat BRANDON suggestive of LA clot breakdown/decrease in size. In the setting that patient is high risk for embolization of clot, continue hep gtt and neuro monitoring.

## 2018-04-21 NOTE — PROGRESS NOTE ADULT - PROBLEM SELECTOR PLAN 4
Hx of palpitations, EKG notable for NSR with 1st degree AV block- evaluated as outpatient by cardiology. Though now with Left atrial thrombus. No current symptoms. Hx of palpitations, EKG notable for NSR with 1st degree AV block- evaluated as outpatient by cardiology. Though now with Left atrial thrombus.   -Remains without symptoms.

## 2018-04-21 NOTE — PROGRESS NOTE ADULT - PROBLEM SELECTOR PLAN 3
BRANDON demonstrated L atrial clot for which patient started on hep gtt as above. Repeat BRANDON demonstrated some improving size of clot/decreasing in size and more notable hypoechogenicity for which suggestive of lysis/breakdown within the clot. As patient remains at significant risk for embolization of clot, plan to continue on hep gtt with continued neuro monitoring.   - c/w hep gtt at 10cc/hr (subtherapeutic this AM) check PTT q6h at this time with PTT goal 60-90. Increase frequency of neuro checks if repeat subtherapeutic and bolus/increase hep dose.   - c/w working with Physical therapy to work with upper body but limited lower body exercises. Patient encouraged out of bed to chair and bedrest otherwise. C/w working with OT/speech. BRANDON demonstrated L atrial clot for which patient started on hep gtt as above. Repeat BRANDON demonstrated some improving size of clot/decreasing in size and more notable hypoechogenicity for which suggestive of lysis/breakdown within the clot. As patient remains at significant risk for embolization of clot, plan to continue on hep gtt with continued neuro monitoring.   - c/w hep gtt at 11cc/hr, has remained therapeutic as above will decrease frequency of neuro checks from 2 to q4h.   - c/w working with Physical therapy to work with upper body but limited lower body exercises. Patient encouraged out of bed to chair and bedrest otherwise. C/w working with OT/speech.

## 2018-04-21 NOTE — PROGRESS NOTE ADULT - ATTENDING COMMENTS
I have seen and examined this patient and reviewed the vitals, labs, imaging and plans with the Resident and agree with the Assessment and Plan.

## 2018-04-22 LAB
ANION GAP SERPL CALC-SCNC: 11 MMOL/L — SIGNIFICANT CHANGE UP (ref 5–17)
APTT BLD: 88.8 SEC — HIGH (ref 27.5–37.4)
BUN SERPL-MCNC: 14 MG/DL — SIGNIFICANT CHANGE UP (ref 7–23)
CALCIUM SERPL-MCNC: 9.6 MG/DL — SIGNIFICANT CHANGE UP (ref 8.4–10.5)
CHLORIDE SERPL-SCNC: 102 MMOL/L — SIGNIFICANT CHANGE UP (ref 96–108)
CO2 SERPL-SCNC: 26 MMOL/L — SIGNIFICANT CHANGE UP (ref 22–31)
CREAT SERPL-MCNC: 1.27 MG/DL — SIGNIFICANT CHANGE UP (ref 0.5–1.3)
GLUCOSE SERPL-MCNC: 98 MG/DL — SIGNIFICANT CHANGE UP (ref 70–99)
HCT VFR BLD CALC: 44.1 % — SIGNIFICANT CHANGE UP (ref 39–50)
HGB BLD-MCNC: 14.4 G/DL — SIGNIFICANT CHANGE UP (ref 13–17)
MAGNESIUM SERPL-MCNC: 1.8 MG/DL — SIGNIFICANT CHANGE UP (ref 1.6–2.6)
MCHC RBC-ENTMCNC: 28.3 PG — SIGNIFICANT CHANGE UP (ref 27–34)
MCHC RBC-ENTMCNC: 32.7 G/DL — SIGNIFICANT CHANGE UP (ref 32–36)
MCV RBC AUTO: 86.8 FL — SIGNIFICANT CHANGE UP (ref 80–100)
PLATELET # BLD AUTO: 361 K/UL — SIGNIFICANT CHANGE UP (ref 150–400)
POTASSIUM SERPL-MCNC: 4.1 MMOL/L — SIGNIFICANT CHANGE UP (ref 3.5–5.3)
POTASSIUM SERPL-SCNC: 4.1 MMOL/L — SIGNIFICANT CHANGE UP (ref 3.5–5.3)
RBC # BLD: 5.08 M/UL — SIGNIFICANT CHANGE UP (ref 4.2–5.8)
RBC # FLD: 13.9 % — SIGNIFICANT CHANGE UP (ref 10.3–16.9)
SODIUM SERPL-SCNC: 139 MMOL/L — SIGNIFICANT CHANGE UP (ref 135–145)
WBC # BLD: 4.8 K/UL — SIGNIFICANT CHANGE UP (ref 3.8–10.5)
WBC # FLD AUTO: 4.8 K/UL — SIGNIFICANT CHANGE UP (ref 3.8–10.5)

## 2018-04-22 PROCEDURE — 99233 SBSQ HOSP IP/OBS HIGH 50: CPT

## 2018-04-22 RX ADMIN — ATORVASTATIN CALCIUM 80 MILLIGRAM(S): 80 TABLET, FILM COATED ORAL at 22:26

## 2018-04-22 RX ADMIN — Medication 81 MILLIGRAM(S): at 12:20

## 2018-04-22 RX ADMIN — SENNA PLUS 1 TABLET(S): 8.6 TABLET ORAL at 12:21

## 2018-04-22 RX ADMIN — Medication 100 MILLIGRAM(S): at 12:21

## 2018-04-22 RX ADMIN — ESCITALOPRAM OXALATE 5 MILLIGRAM(S): 10 TABLET, FILM COATED ORAL at 12:20

## 2018-04-22 RX ADMIN — DONEPEZIL HYDROCHLORIDE 5 MILLIGRAM(S): 10 TABLET, FILM COATED ORAL at 22:26

## 2018-04-22 NOTE — PROGRESS NOTE ADULT - SUBJECTIVE AND OBJECTIVE BOX
INTERVAL EVENTS: Patient seen and examined at bedside. No acute events overnight. Therapeutic PTT o/n- no adjustments. Denies, headaches, vision changes, dizziness, weakness, fevers, chills, cough, congestion.     OBJECTIVE:    Vital Signs Last 12 Hrs  T(F): 98.2 (04-22-18 @ 05:00), Max: 98.2 (04-21-18 @ 21:00)  HR: 54 (04-22-18 @ 04:28) (54 - 64)  BP: 124/80 (04-22-18 @ 04:28) (120/70 - 135/73)  BP(mean): 97 (04-22-18 @ 04:28) (86 - 97)  RR: 14 (04-22-18 @ 04:28) (14 - 17)  SpO2: 99% (04-22-18 @ 04:28) (96% - 99%)  I&O's Summary    20 Apr 2018 07:01  -  21 Apr 2018 07:00  --------------------------------------------------------  IN: 228 mL / OUT: 600 mL / NET: -372 mL    21 Apr 2018 07:01  -  22 Apr 2018 06:55  --------------------------------------------------------  IN: 110 mL / OUT: 270 mL / NET: -160 mL        PHYSICAL EXAM:    Constitutional: NAD, AAOx3, comfortable in bed.   HEENT:  PERRL. EOMI. Conjunctiva clear b/l. Moist oral mucosa.  Respiratory: Normal rate, rhythm, depth, effort. CTAB. No w/r/r.   Cardiovascular: RRR, normal S1 and S2, no m/r/g.   Gastrointestinal: +BS, soft NTND.   Extremities: wwp, no edema.   Vascular: Pulses equal and strong throughout.   Neurological: Alert and awake oriented x3. Following commands. Making eye contact. 5/5 strength in left upper (, elbow flex/ext), 1/5 strength in  1/5 in elbow flex/ext 2/5 shoulder abduction. 5/5 strength in lower extremities (hip flex, dorsi/plantar flex) b/l. R side facial droop/ L side rising> R. L side tongue deviation. Difficulty assessing soft palate rise. EOMI. Shoulder shrug intact on L side and diminished on R side.  Skin: No gross skin abnormalities.       LABS:                        15.2   5.6   )-----------( 309      ( 21 Apr 2018 06:45 )             45.6     04-21    135  |  100  |  18  ----------------------------<  96  4.3   |  27  |  1.40<H>    Ca    9.8      21 Apr 2018 06:45  Mg     1.7     04-21      PT/INR - ( 20 Apr 2018 13:13 )   PT: 13.3 sec;   INR: 1.19          PTT - ( 21 Apr 2018 18:40 )  PTT:82.3 sec      RADIOLOGY & ADDITIONAL TESTS:    MEDICATIONS  (STANDING):  aspirin enteric coated 81 milliGRAM(s) Oral daily  atorvastatin 80 milliGRAM(s) Oral at bedtime  docusate sodium 100 milliGRAM(s) Oral daily  donepezil 5 milliGRAM(s) Oral at bedtime  escitalopram 5 milliGRAM(s) Oral daily  heparin  Infusion 1100 Unit(s)/Hr (11 mL/Hr) IV Continuous <Continuous>  senna 1 Tablet(s) Oral daily    MEDICATIONS  (PRN):  acetaminophen   Tablet 650 milliGRAM(s) Oral every 6 hours PRN For Temp greater than 38 C (100.4 F)  acetaminophen   Tablet. 650 milliGRAM(s) Oral every 6 hours PRN Mild Pain (1 - 3)  clonazePAM Tablet 0.5 milliGRAM(s) Oral at bedtime PRN insomnia  polyethylene glycol 3350 17 Gram(s) Oral daily PRN Constipation INTERVAL EVENTS: Patient seen and examined at bedside. No acute events overnight. Therapeutic PTT this morning 88.8. Denies, headaches, vision changes, dizziness, weakness, fevers, chills, cough, congestion.     OBJECTIVE:    Vital Signs Last 12 Hrs  T(F): 98.2 (04-22-18 @ 05:00), Max: 98.2 (04-21-18 @ 21:00)  HR: 54 (04-22-18 @ 04:28) (54 - 64)  BP: 124/80 (04-22-18 @ 04:28) (120/70 - 135/73)  BP(mean): 97 (04-22-18 @ 04:28) (86 - 97)  RR: 14 (04-22-18 @ 04:28) (14 - 17)  SpO2: 99% (04-22-18 @ 04:28) (96% - 99%)  I&O's Summary    20 Apr 2018 07:01  -  21 Apr 2018 07:00  --------------------------------------------------------  IN: 228 mL / OUT: 600 mL / NET: -372 mL    21 Apr 2018 07:01  -  22 Apr 2018 06:55  --------------------------------------------------------  IN: 110 mL / OUT: 270 mL / NET: -160 mL        PHYSICAL EXAM:    Constitutional: NAD, AAOx3, comfortable in bed.   HEENT:  PERRL. EOMI. Conjunctiva clear b/l. Moist oral mucosa.  Respiratory: Normal rate, rhythm, depth, effort. CTAB. No w/r/r.   Cardiovascular: RRR, normal S1 and S2, no m/r/g.   Gastrointestinal: +BS, soft NTND.   Extremities: wwp, no edema.   Vascular: Pulses equal and strong throughout.   Neurological: Alert and awake oriented x3. Following commands. Making eye contact. 5/5 strength in left upper (, elbow flex/ext), 1/5 strength in  1/5 in elbow flex/ext 2/5 shoulder abduction. 5/5 strength in lower extremities (hip flex, dorsi/plantar flex) b/l. R side facial droop/ L side rising> R. L side tongue deviation. Difficulty assessing soft palate rise. EOMI. Shoulder shrug intact on L side and diminished on R side.  Skin: No gross skin abnormalities.       LABS:                        15.2   5.6   )-----------( 309      ( 21 Apr 2018 06:45 )             45.6     04-21    135  |  100  |  18  ----------------------------<  96  4.3   |  27  |  1.40<H>    Ca    9.8      21 Apr 2018 06:45  Mg     1.7     04-21      PT/INR - ( 20 Apr 2018 13:13 )   PT: 13.3 sec;   INR: 1.19          PTT - ( 21 Apr 2018 18:40 )  PTT:82.3 sec      RADIOLOGY & ADDITIONAL TESTS:    MEDICATIONS  (STANDING):  aspirin enteric coated 81 milliGRAM(s) Oral daily  atorvastatin 80 milliGRAM(s) Oral at bedtime  docusate sodium 100 milliGRAM(s) Oral daily  donepezil 5 milliGRAM(s) Oral at bedtime  escitalopram 5 milliGRAM(s) Oral daily  heparin  Infusion 1100 Unit(s)/Hr (11 mL/Hr) IV Continuous <Continuous>  senna 1 Tablet(s) Oral daily    MEDICATIONS  (PRN):  acetaminophen   Tablet 650 milliGRAM(s) Oral every 6 hours PRN For Temp greater than 38 C (100.4 F)  acetaminophen   Tablet. 650 milliGRAM(s) Oral every 6 hours PRN Mild Pain (1 - 3)  clonazePAM Tablet 0.5 milliGRAM(s) Oral at bedtime PRN insomnia  polyethylene glycol 3350 17 Gram(s) Oral daily PRN Constipation

## 2018-04-22 NOTE — PROGRESS NOTE ADULT - PROBLEM SELECTOR PLAN 4
Hx of palpitations, EKG notable for NSR with 1st degree AV block- evaluated as outpatient by cardiology. Though now with Left atrial thrombus.   -Remains without symptoms.

## 2018-04-22 NOTE — PROGRESS NOTE ADULT - PROBLEM SELECTOR PLAN 6
F: No IVF, PO intake  E: Replete electrolytes to maintain K>4 and Mg>2  N:  DASH Diet as tolerated F: No IVF, PO intake  E: Replete electrolytes to maintain K>4 and Mg>2  N: DASH Diet as tolerated

## 2018-04-22 NOTE — PROGRESS NOTE ADULT - PROBLEM SELECTOR PLAN 3
BRANDON demonstrated L atrial clot for which patient started on hep gtt as above. Repeat BRANDON demonstrated some improving size of clot/decreasing in size and more notable hypoechogenicity for which suggestive of lysis/breakdown within the clot. As patient remains at significant risk for embolization of clot, plan to continue on hep gtt with continued neuro monitoring.   - c/w hep gtt at 11cc/hr, has remained therapeutic as above will decrease frequency of neuro checks from 2 to q4h.   - c/w working with Physical therapy to work with upper body but limited lower body exercises. Patient encouraged out of bed to chair and bedrest otherwise. C/w working with OT/speech. BRANDON demonstrated L atrial clot for which patient started on hep gtt as above. Repeat BRANDON demonstrated some improving size of clot/decreasing in size and more notable hypoechogenicity for which suggestive of lysis/breakdown within the clot. As patient remains at significant risk for embolization of clot, plan to continue on hep gtt with continued neuro monitoring.   - c/w hep gtt at 11cc/hr, has remained therapeutic as above  - neuro checks q4h.   - c/w working with Physical therapy to work with upper body but limited lower body exercises. Patient encouraged out of bed to chair and bedrest otherwise. C/w working with OT/speech.

## 2018-04-22 NOTE — PROGRESS NOTE ADULT - PROBLEM SELECTOR PLAN 5
Notable Cr elevation to 1.38 on admission and remains between 1.38-1.46. Unclear baseline. s/p IVF and has remained stable therefore likely underlying CKD though unclear etiology. CKD stage 2.   -c/w monitoring Cr and UOP. Remains stable- Cr at 1.4 today. Notable Cr elevation to 1.38 on admission and remains between 1.38-1.46. Unclear baseline. s/p IVF and has remained stable therefore likely underlying CKD though unclear etiology. CKD stage 2.   -c/w monitoring Cr and UOP. Remains stable- Cr at 1.27 today.  - RESOLVED

## 2018-04-22 NOTE — PROGRESS NOTE ADULT - PROBLEM SELECTOR PLAN 1
Presented from [facility] after being found down, L MCA stroke s/p TPA, CT head notable for M2 occlusion and transferred for thrombectomy. A1c 5.1, HDL 65/LDL 90/ . Hypercoagulable work up performed and notable for + anticardiolipin and MTHFR gene mutation for which heme/onc following. Also of note, echocardiogram significant for left atrial thrombus/mass for which patient remains on hep gtt. Repeat BRANDON demonstrated improving size of clot with noted increasing hypoechogenicity within the clot suggestive of lysis/breakdown. Patient is at increased risk for clot dislodging/emboli patient determined to warrant further management with hep gtt and neuro monitoring.  - As above, will continue with hep gtt with PTt goal 60-90. hep gtt noted to be 59 this AM but concern that draw was likely too early, repeat this afternoon 78- therefore will repeat PTT at 6pm.     - C/w lipitor and aspirin 81mg PO and BP goal 140-160  - c/w monitoring q4h neuro checks now that patient remains therapeutic  - Heme/onc following for hypercoaguable/ anticardiolipin+/MTHFR, remains on Hep gtt   - c/w lexapro for motor recovery and atricept for neurosignaling s/p CVA  - PT to work with patient on upper body motor and reduce LE use in setting of LA clot concerning for emboli formation. Patient can get out of bed to chair but otherwise bed rest.   - continue to work with speech pathology/OT  - Improving strength in R upper extremity, especially proximal muscles Presented from [facility] after being found down, L MCA stroke s/p TPA, CT head notable for M2 occlusion and transferred for thrombectomy. A1c 5.1, HDL 65/LDL 90/ . Hypercoagulable work up performed and notable for + anticardiolipin and MTHFR gene mutation for which heme/onc following. Also of note, echocardiogram significant for left atrial thrombus/mass for which patient remains on hep gtt. Repeat BRANDON demonstrated improving size of clot with noted increasing hypoechogenicity within the clot suggestive of lysis/breakdown. Patient is at increased risk for clot dislodging/emboli patient determined to warrant further management with hep gtt and neuro monitoring.  - As above, will continue with hep gtt with PTt goal 60-90. PTT 88.8 this AM  - C/w lipitor and aspirin 81mg PO and BP goal 140-160  - c/w monitoring q4h neuro checks now that patient remains therapeutic  - Heme/onc following for hypercoaguable/ anticardiolipin+/MTHFR, remains on Hep gtt   - c/w lexapro for motor recovery and atricept for neurosignaling s/p CVA  - PT to work with patient on upper body motor and reduce LE use in setting of LA clot concerning for emboli formation. Patient can get out of bed to chair but otherwise bed rest.   - continue to work with speech pathology/OT  - Improving strength in R upper extremity, especially proximal muscles Presented from [facility] after being found down, L MCA stroke s/p TPA, CT head notable for M2 occlusion and transferred for thrombectomy. A1c 5.1, HDL 65/LDL 90/ . Hypercoagulable work up performed and notable for + anticardiolipin and MTHFR gene mutation for which heme/onc following. Also of note, echocardiogram significant for left atrial thrombus/mass for which patient remains on hep gtt. Repeat BRANDON demonstrated improving size of clot with noted increasing hypoechogenicity within the clot suggestive of lysis/breakdown. Patient is at increased risk for clot dislodging/emboli patient determined to warrant further management with hep gtt and neuro monitoring.  - As above, will continue with hep gtt with PTT goal 60-90. PTT 88.8 this AM  - C/w Lipitor and aspirin 81mg PO and BP goal 140-160  - c/w monitoring q4h neuro checks now that patient remains therapeutic  - Heme/onc following for hypercoaguable/ anticardiolipin+/MTHFR, remains on Hep gtt   - c/w lexapro for motor recovery and atricept for neurosignaling s/p CVA  - PT to work with patient on upper body motor and reduce LE use in setting of LA clot concerning for emboli formation. Patient can get out of bed to chair but otherwise bed rest.   - continue to work with speech pathology/OT  - Improving strength in R upper extremity, especially proximal muscles

## 2018-04-22 NOTE — PROGRESS NOTE ADULT - PROBLEM SELECTOR PLAN 7
VTE: Hep gtt, SCDs  FULL CODE  Dispo: Admitted to [facility] for management of post CVA and hep gtt for Left atrial thrombus. Will remain on hep gtt and neuro monitoring at this time.

## 2018-04-22 NOTE — PROGRESS NOTE ADULT - PROBLEM SELECTOR PLAN 2
As above notable anticardiolipin positive antibody for which Heme/Onc (Jennifer) following.   -c/w hep gtt and f/u heme/onc recs  -PTT therapeutic this AM   - Patient has remained therapeutic therefore can decrease frequency of neuro checks. As above notable anticardiolipin positive antibody for which Heme/Onc (Jennifer) following.   -c/w hep gtt and f/u heme/onc recs  - PTT therapeutic this AM As above notable anticardiolipin positive antibody for which Heme/Onc (Jennifer) following.   - c/w hep gtt and f/u heme/onc recs  - PTT therapeutic this AM

## 2018-04-22 NOTE — PROGRESS NOTE ADULT - ASSESSMENT
A 53yo M with no significant PMH presented initially to [place] after being found down and found to have acute left M2 occlusion s/p tPA transferred from [facility] to [facility] for thrombectomy now s/p thrombectomy. Course complicated as found to have large L atrial appendage mass/clot, on heparin gtt. Repeat BRANDON suggestive of LA clot breakdown/decrease in size. In the setting that patient is high risk for embolization of clot, continue hep gtt and neuro monitoring.

## 2018-04-23 LAB — APTT BLD: 66.4 SEC — HIGH (ref 27.5–37.4)

## 2018-04-23 PROCEDURE — 99232 SBSQ HOSP IP/OBS MODERATE 35: CPT

## 2018-04-23 RX ADMIN — HEPARIN SODIUM 11 UNIT(S)/HR: 5000 INJECTION INTRAVENOUS; SUBCUTANEOUS at 11:43

## 2018-04-23 RX ADMIN — DONEPEZIL HYDROCHLORIDE 5 MILLIGRAM(S): 10 TABLET, FILM COATED ORAL at 21:55

## 2018-04-23 RX ADMIN — SENNA PLUS 1 TABLET(S): 8.6 TABLET ORAL at 11:43

## 2018-04-23 RX ADMIN — Medication 81 MILLIGRAM(S): at 11:43

## 2018-04-23 RX ADMIN — ESCITALOPRAM OXALATE 5 MILLIGRAM(S): 10 TABLET, FILM COATED ORAL at 11:43

## 2018-04-23 RX ADMIN — Medication 100 MILLIGRAM(S): at 11:43

## 2018-04-23 RX ADMIN — ATORVASTATIN CALCIUM 80 MILLIGRAM(S): 80 TABLET, FILM COATED ORAL at 21:55

## 2018-04-23 NOTE — PROGRESS NOTE ADULT - SUBJECTIVE AND OBJECTIVE BOX
PROGRESS NOTE    CC: L MCA stroke, s/p thrombectomy, LA thrombus  Overnight Events: LIDA. Therapeutic PTT.  Interval History: No complaints.  ROS: Denies headaches, dizziness, vision changes, shortness of breath, chest pain, palpitations. No nausea, vomiting or abdominal pain.     OBJECTIVE  Vitals:  T(C): 36.9 (04-23-18 @ 05:50), Max: 36.9 (04-22-18 @ 14:05)  HR: 54 (04-23-18 @ 04:31) (54 - 62)  BP: 139/81 (04-23-18 @ 04:31) (119/63 - 139/81)  RR: 14 (04-23-18 @ 04:31) (14 - 16)  SpO2: 98% (04-23-18 @ 04:31) (96% - 98%)  Wt(kg): --    I/O:  I&O's Summary    22 Apr 2018 07:01  -  23 Apr 2018 07:00  --------------------------------------------------------  IN: 132 mL / OUT: 325 mL / NET: -193 mL        PHYSICAL EXAM:  Appearance: NAD. Speaking in full sentences.   HEENT:  PERRL. EOMI. Conjunctiva clear b/l. Moist oral mucosa.  Cardiovascular: RRR with no murmurs.  Respiratory: Lungs CTAB.   Gastrointestinal:  Soft, nontender. Non-distended. Non-rigid.	  Extremities: No edema b/l. No erythema b/l. LE WWP b/l.  Vascular: DP 2+ b/l.  Neurologic:  Alert and awake. Following commands, making eye contact. EOMI. Pupillary light reflex intact. Facial muscle with asymmetric rise with R side droop with eyebrow rise intact. Sensation intact. Shoulder shrug improving, R decreased compared to left. 4/5 proximal shoulder abduction, 2/5 elbow flex, 1/5 elbow ext, 1/5  strength on R side. 5/5 strength in L upper extremity and b/l lower extremity. Sensation intact.   	  LABS:                        14.4   4.8   )-----------( 361      ( 22 Apr 2018 06:35 )             44.1     04-22    139  |  102  |  14  ----------------------------<  98  4.1   |  26  |  1.27    Ca    9.6      22 Apr 2018 06:35  Mg     1.8     04-22      PTT - ( 23 Apr 2018 06:24 )  PTT:66.4 sec      RADIOLOGY & ADDITIONAL TESTS:  Reviewed .    MEDICATIONS  (STANDING):  aspirin enteric coated 81 milliGRAM(s) Oral daily  atorvastatin 80 milliGRAM(s) Oral at bedtime  docusate sodium 100 milliGRAM(s) Oral daily  donepezil 5 milliGRAM(s) Oral at bedtime  escitalopram 5 milliGRAM(s) Oral daily  heparin  Infusion 1100 Unit(s)/Hr (11 mL/Hr) IV Continuous <Continuous>  senna 1 Tablet(s) Oral daily    MEDICATIONS  (PRN):  acetaminophen   Tablet 650 milliGRAM(s) Oral every 6 hours PRN For Temp greater than 38 C (100.4 F)  acetaminophen   Tablet. 650 milliGRAM(s) Oral every 6 hours PRN Mild Pain (1 - 3)  clonazePAM Tablet 0.5 milliGRAM(s) Oral at bedtime PRN insomnia  polyethylene glycol 3350 17 Gram(s) Oral daily PRN Constipation

## 2018-04-23 NOTE — PROGRESS NOTE ADULT - PROBLEM SELECTOR PLAN 5
Cr noted to be elevated- has remained stable. Likely underlying CKD stage 2.   -c/w monitoring Cr and UOP. Remains stable- Cr at 1.27 today.

## 2018-04-23 NOTE — PROGRESS NOTE ADULT - ATTENDING COMMENTS
Patient seen and examined.  Agree with above.  No acute complaints.    He's stronger than when I examined him one week ago.  He can lift his right arm now.  He's been practicing with a board and rolling apparatus.  Still on heparin gtt - no bleeding in urine or stool.  Encouraged exercise at bedside, as restricted by Dr. Mcfadden.  He had repeat BRANDON last week that showed some decrease in the thrombus but awaiting repeat BRANDON later this week to evaluate the size of his atrial thrombus.

## 2018-04-23 NOTE — PROGRESS NOTE ADULT - PROBLEM SELECTOR PLAN 3
BRANDON demonstrated L atrial clot for which patient started on hep gtt as above. Repeat BRANDON demonstrated some improving size of clot/decreasing in size and more notable hypoechogenicity for which suggestive of lysis/breakdown within the clot. As patient remains at significant risk for embolization of clot, plan to continue on hep gtt with continued neuro monitoring.   - c/w hep gtt at 11cc/hr, has remained therapeutic as above  - neuro checks q4h.   - working with PT on upper body focused, out of bed to chair and limited to bed rest with concern for at risk for embolization of clot.

## 2018-04-23 NOTE — PROGRESS NOTE ADULT - PROBLEM SELECTOR PLAN 1
Presented from [facility] after being found down, L MCA stroke s/p TPA, CT head notable for M2 occlusion and transferred for thrombectomy. A1c 5.1, HDL 65/LDL 90/ . Hypercoagulable work up performed and notable for + anticardiolipin and MTHFR gene mutation for which heme/onc following. Also of note, echocardiogram significant for left atrial thrombus/mass for which patient remains on hep gtt. Repeat BRANDON demonstrated improving size of clot with noted increasing hypoechogenicity within the clot suggestive of lysis/breakdown. Patient is at increased risk for clot dislodging/emboli patient determined to warrant further management with hep gtt and neuro monitoring. Neuro exam continues to improve- R upper extremity strength continues to improve.   - c/w hep gtt with PTT goal 60-90. PTT has remained therapeutic.   - C/w Lipitor and aspirin 81mg PO and BP goal 140-160  - c/w monitoring q4h neuro checks  - + hypercoaguable/ anticardiolipin+/MTHFR, remains on Hep gtt   - c/w lexapro for motor recovery and atricept for neurosignaling s/p CVA  - PT to work with patient on upper body motor and reduce LE use in setting of LA clot concerning for emboli formation. Patient can get out of bed to chair but otherwise bed rest.   - continue to work with speech pathology/OT

## 2018-04-23 NOTE — PROGRESS NOTE ADULT - PROBLEM SELECTOR PLAN 2
As above notable anticardiolipin positive antibody for which Heme/Onc (Jennifer) following.   - c/w hep gtt and patient remains therapeutic, no changes.

## 2018-04-23 NOTE — PROGRESS NOTE ADULT - PROBLEM SELECTOR PLAN 7
VTE: Hep gtt, SCDs    FULL CODE    Dispo: Admitted to [facility] for management of post CVA and hep gtt for Left atrial thrombus. Will remain on hep gtt and neuro monitoring at this time.

## 2018-04-23 NOTE — PROGRESS NOTE ADULT - PROBLEM SELECTOR PLAN 4
Hx of palpitations, EKG notable for NSR with 1st degree AV block- evaluated as outpatient by cardiology. With LA thrombus- noted to be decreasing on repeat BRANDON- pending additional BRANDON for evaluation. Remains without palpitations.

## 2018-04-23 NOTE — PROGRESS NOTE ADULT - ASSESSMENT
A 53yo M with no significant PMH presented initially to [place] after being found down and found to have acute left M2 occlusion s/p tPA transferred from [facility] to [facility] for thrombectomy now s/p thrombectomy. Course complicated as found to have large L atrial appendage mass/clot, on heparin gtt. Repeat BRANDON suggestive of LA clot breakdown/decrease in size. In the setting that patient is high risk for embolization of clot, continue hep gtt and neuro monitoring. Pending repeat BRANDON for evaluation of LA clot size.

## 2018-04-24 PROCEDURE — 99232 SBSQ HOSP IP/OBS MODERATE 35: CPT

## 2018-04-24 RX ADMIN — HEPARIN SODIUM 11 UNIT(S)/HR: 5000 INJECTION INTRAVENOUS; SUBCUTANEOUS at 23:34

## 2018-04-24 RX ADMIN — Medication 100 MILLIGRAM(S): at 12:25

## 2018-04-24 RX ADMIN — Medication 81 MILLIGRAM(S): at 12:25

## 2018-04-24 RX ADMIN — DONEPEZIL HYDROCHLORIDE 5 MILLIGRAM(S): 10 TABLET, FILM COATED ORAL at 21:43

## 2018-04-24 RX ADMIN — HEPARIN SODIUM 11 UNIT(S)/HR: 5000 INJECTION INTRAVENOUS; SUBCUTANEOUS at 01:27

## 2018-04-24 RX ADMIN — SENNA PLUS 1 TABLET(S): 8.6 TABLET ORAL at 12:25

## 2018-04-24 RX ADMIN — ATORVASTATIN CALCIUM 80 MILLIGRAM(S): 80 TABLET, FILM COATED ORAL at 21:43

## 2018-04-24 RX ADMIN — ESCITALOPRAM OXALATE 5 MILLIGRAM(S): 10 TABLET, FILM COATED ORAL at 12:25

## 2018-04-24 NOTE — PROGRESS NOTE ADULT - SUBJECTIVE AND OBJECTIVE BOX
PROGRESS NOTE    CC: L MCA CVA s/p thrombectomy, LA clot on hep gtt.  Overnight Events: LIDA.  Interval History:  No complaints.   ROS: Denies headaches, dizziness, vision changes, denies shortness of breath, palpitations, chest pain, no nausea, vomiting, or abdominal pain.    OBJECTIVE  Vitals:  T(C): 36.3 (04-24-18 @ 05:00), Max: 36.9 (04-23-18 @ 16:00)  HR: 56 (04-24-18 @ 04:20) (54 - 72)  BP: 127/79 (04-24-18 @ 04:20) (120/72 - 132/66)  RR: 16 (04-24-18 @ 04:20) (15 - 18)  SpO2: 97% (04-24-18 @ 04:20) (97% - 99%)  Wt(kg): --    I/O:  I&O's Summary    23 Apr 2018 07:01  -  24 Apr 2018 07:00  --------------------------------------------------------  IN: 132 mL / OUT: 300 mL / NET: -168 mL        PHYSICAL EXAM:  Appearance: NAD. Speaking in full sentences.   HEENT: PERRL.  Conjunctiva clear b/l. Moist oral mucosa.  Cardiovascular: RRR S1, S2 with no murmurs.  Respiratory: Lungs CTAB.   Gastrointestinal:  Soft, nontender. Non-distended. Non-rigid.	  Extremities: No edema b/l. No erythema b/l. LE WWP b/l.  Vascular: DP 2+ b/l.  Neurologic:  Alert and awake. Following commands, making eye contact. EOMI. Pupillary light reflex intact. Facial muscle with asymmetric rise with R side droop with eyebrow rise intact (though noted improvement). Sensation intact. Shoulder shrug improving, R decreased compared to left. 4/5 proximal shoulder abduction, 2/5 elbow flex, 1/5 elbow ext, 1/5  strength on R side. 5/5 strength in L upper extremity and b/l lower extremity. Sensation intact.       LABS:      PTT - ( 23 Apr 2018 06:24 )  PTT:66.4 sec      RADIOLOGY & ADDITIONAL TESTS:  Reviewed .    MEDICATIONS  (STANDING):  aspirin enteric coated 81 milliGRAM(s) Oral daily  atorvastatin 80 milliGRAM(s) Oral at bedtime  docusate sodium 100 milliGRAM(s) Oral daily  donepezil 5 milliGRAM(s) Oral at bedtime  escitalopram 5 milliGRAM(s) Oral daily  heparin  Infusion 1100 Unit(s)/Hr (11 mL/Hr) IV Continuous <Continuous>  senna 1 Tablet(s) Oral daily    MEDICATIONS  (PRN):  acetaminophen   Tablet 650 milliGRAM(s) Oral every 6 hours PRN For Temp greater than 38 C (100.4 F)  acetaminophen   Tablet. 650 milliGRAM(s) Oral every 6 hours PRN Mild Pain (1 - 3)  polyethylene glycol 3350 17 Gram(s) Oral daily PRN Constipation

## 2018-04-24 NOTE — PROGRESS NOTE ADULT - ATTENDING COMMENTS
Patient seen and examined.  Agree with above.  No acute complaints.    He's doing well in terms of being able to raise his right arm, though mainly from his shoulder.  He has reasonable strength in his right leg.  Still on heparin gtt - no bleeding in urine or stool.  Encouraged exercise at bedside, as restricted by Dr. Mcfadden.  BRANDON tomorrow to evaluate the size of his atrial thrombus

## 2018-04-24 NOTE — PROGRESS NOTE ADULT - PROBLEM SELECTOR PLAN 5
Cr noted to be elevated- has remained stable. Likely underlying CKD stage 2.   -c/w monitoring Cr and UOP. Remains stable.

## 2018-04-24 NOTE — PROGRESS NOTE ADULT - PROBLEM SELECTOR PLAN 3
BRANDON demonstrated L atrial clot for which patient started on hep gtt as above. Repeat BRANDON demonstrated some improving size of clot/decreasing in size and more notable hypoechogenicity for which suggestive of lysis/breakdown within the clot. As patient remains at significant risk for embolization of clot, patient remains on hep gtt with continued neuro monitoring q4h. Pending repeat BRANDON for evaluation of clot size for further management.    - c/w hep gtt at 11cc/hr, has remained therapeutic as above.  - neuro checks q4h.   - working with PT on upper body focused, out of bed to chair and limited to bed rest with concern for at risk for embolization of clot. BRANDON demonstrated L atrial clot for which patient started on hep gtt as above. Repeat BRANDON demonstrated some improving size of clot/decreasing in size and more notable hypoechogenicity for which suggestive of lysis/breakdown within the clot. As patient remains at significant risk for embolization of clot, patient remains on hep gtt with continued neuro monitoring q4h. Pending repeat BRANDON for evaluation of clot size for further management.    - c/w hep gtt at 11cc/hr, has remained therapeutic as above.  - neuro checks q4h.   - working with PT on upper body focused, out of bed to chair and limited to bed rest with concern for at risk for embolization of clot.  -Repeat BRANDON, potentially tomorrow- will discuss with cardio.

## 2018-04-24 NOTE — PROGRESS NOTE ADULT - PROBLEM SELECTOR PLAN 1
MCA stroke s/p TPA, CT head notable for M2 occlusion s/p thrombectomy. A1c 5.1, HDL 65/LDL 90/ . Hypercoagulable work up performed and notable for + anticardiolipin and MTHFR gene mutation for which heme/onc following. Also of note, echocardiogram significant for left atrial thrombus/mass for which patient remains on hep gtt. Repeat BRANDON demonstrated improving size of clot with noted increasing hypoechogenicity within the clot suggestive of lysis/breakdown- therefore plan to continue with hep gtt. Given increased risk for embolization of clot, patient remains on 7LACH for neuro monitoring. Neuro exam improving- RUE strength and facial drop notable improvement.   - c/w hep gtt with PTT goal 60-90. PTT has remained therapeutic.   - C/w Lipitor and aspirin 81mg PO and BP goal 140-160  - c/w monitoring q4h neuro checks  - + hypercoaguable/ anticardiolipin+/MTHFR, remains on Hep gtt   - c/w lexapro for motor recovery and atricept for neurosignaling s/p CVA  - PT to work with patient on upper body motor and reduce LE use in setting of LA clot concerning for emboli formation. Patient can get out of bed to chair but otherwise bed rest. MCA stroke s/p TPA, CT head notable for M2 occlusion s/p thrombectomy. A1c 5.1, HDL 65/LDL 90/ . Hypercoagulable work up performed and notable for + anticardiolipin and MTHFR gene mutation for which heme/onc following. Also of note, echocardiogram significant for left atrial thrombus/mass for which patient remains on hep gtt. Repeat BRANDON demonstrated improving size of clot with noted increasing hypoechogenicity within the clot suggestive of lysis/breakdown- therefore plan to continue with hep gtt. Given increased risk for embolization of clot, patient remains on 7LACH for neuro monitoring. Neuro exam improving- RUE strength and facial drop notable improvement.   - c/w hep gtt with PTT goal 60-90. PTT has remained therapeutic.   - C/w Lipitor and aspirin 81mg PO.  - SBP goal < 160, SBP as remained stable between 120-40s.   - c/w monitoring q4h neuro checks  - + hypercoaguable/ anticardiolipin+/MTHFR, remains on Hep gtt   - c/w lexapro for motor recovery and atricept for neurosignaling s/p CVA  - PT to work with patient on upper body motor and reduce LE use in setting of LA clot concerning for emboli formation. Patient can get out of bed to chair but otherwise bed rest.

## 2018-04-24 NOTE — PROGRESS NOTE ADULT - PROBLEM SELECTOR PLAN 2
As above notable anticardiolipin positive antibody for which Heme/Onc (Jennifer) following.   - c/w hep gtt and patient remains therapeutic, no changes. Pending potential repeat BRANDON for further evaluation of LA clot for further management.

## 2018-04-24 NOTE — PROGRESS NOTE ADULT - PROBLEM SELECTOR PLAN 7
VTE: Hep gtt, SCDs    FULL CODE    Dispo: Admitted to [facility] for management of post CVA and hep gtt for Left atrial thrombus. Given increased risk for embolization, patient remains on hep gtt and q4h neuro monitoring. VTE: Hep gtt, SCDs    FULL CODE    Dispo: Admitted to [facility] for management of post CVA and hep gtt for Left atrial thrombus. Given increased risk for embolization, patient remains on hep gtt and q4h neuro monitoring. Repeat BRANDON potentially tomorrow.

## 2018-04-24 NOTE — CHART NOTE - NSCHARTNOTEFT_GEN_A_CORE
Admitting Diagnosis:   Patient is a 52y old  Male who presents with a chief complaint of CVA (07 Apr 2018 16:49)      PAST MEDICAL & SURGICAL HISTORY:  Cerebrovascular accident (CVA) due to occlusion of left middle cerebral artery: Left M2 occlusion  Irregular heart beat      Current Nutrition Order:  DASH  NPO at [facility] for repeat BRANDON      PO Intake: Good (%) [ X  ]  Fair (50-75%) [   ] Poor (<25%) [   ]    GI Issues: No N/V/C/D reported at this time. Last BM 4/23    Pain: No pain endorsed at this time     Skin Integrity: Vidal 19, intact      Labs:   Na/K/Mg WNL      CAPILLARY BLOOD GLUCOSE          Medications:  MEDICATIONS  (STANDING):  aspirin enteric coated 81 milliGRAM(s) Oral daily  atorvastatin 80 milliGRAM(s) Oral at bedtime  docusate sodium 100 milliGRAM(s) Oral daily  donepezil 5 milliGRAM(s) Oral at bedtime  escitalopram 5 milliGRAM(s) Oral daily  heparin  Infusion 1100 Unit(s)/Hr (11 mL/Hr) IV Continuous <Continuous>  senna 1 Tablet(s) Oral daily    MEDICATIONS  (PRN):  acetaminophen   Tablet 650 milliGRAM(s) Oral every 6 hours PRN For Temp greater than 38 C (100.4 F)  acetaminophen   Tablet. 650 milliGRAM(s) Oral every 6 hours PRN Mild Pain (1 - 3)  polyethylene glycol 3350 17 Gram(s) Oral daily PRN Constipation      Weight: 92.3kg  Daily     Daily     Weight Change: No new weights recorded since admit     Estimated energy needs: ABW used for calculations as pt between % of IBW. Needs estimated for maintenance in adults; adjusted protein needs for s/p CVA  Calories: 25-30 kcal/kg = 6737-3172 kcal/day  Protein: 1.0-1.2 g/kg =  g protein/day  Fluids: 25-30 mL/kg = 2080-7007 mL/day    Subjective: 53 yo/male admitted s/p CVA, thrombectomy and tPA administration. S/p BRANDON showing large L. atrial appendage mass, started on heparin gtt; s/p repeat BRANDON which showed LA clot breakdown and decrease in size. Plan per MD note to continue on hep gtt and do another BRANDON on 4/25 to show if further size reduction. Pt seen in room, awake, alert, very pleasant, daughter at bedside. 100% intake at all meals, but daughter asking about Ensure 1x/day to supplement and pt amenable. No nutrition-related complaints at this time. Pending BRENDA.     Previous Nutrition Diagnosis:  Increased protein-calorie needs RT increased demand for intake AEB s/p CVA    Active [ X  ]  Resolved [   ]    If resolved, new PES:     Goal: Pt will continue to meet % of EER per day via oral intake     Recommendations:  1. Recommend Ensure 1x/day (350 kcal, 20g protein)  2. Encourage PO intake  3. Trend weights**  4. Reinforce diet ed    Education: Discussed ONS     Risk Level: High [   ] Moderate [ X  ] Low [   ].

## 2018-04-24 NOTE — PROGRESS NOTE ADULT - ASSESSMENT
A 53yo M with no significant PMH presented initially to [place] after being found down and found to have acute left M2 occlusion s/p tPA transferred from [facility] to [facility] for thrombectomy now s/p thrombectomy. Course complicated as found to have large L atrial appendage mass/clot, on heparin gtt. Repeat BRANDON suggestive of LA clot breakdown/decrease in size. In the setting that patient is high risk for embolization of clot, remains on hep gtt and q4h neuro monitoring. Pending repeat BRANDON for evaluation of LA clot size. A 53yo M with no significant PMH presented initially to [place] after being found down and found to have acute left M2 occlusion s/p tPA transferred from [facility] to [facility] for thrombectomy now s/p thrombectomy. Course complicated as found to have large L atrial appendage mass/clot, on heparin gtt. Repeat BRANDON suggestive of LA clot breakdown/decrease in size. In the setting that patient is high risk for embolization of clot, remains on hep gtt and q4h neuro monitoring. Pending repeat BRANDON for evaluation of LA clot size- potentially tomorrow.

## 2018-04-25 LAB
ANION GAP SERPL CALC-SCNC: 13 MMOL/L — SIGNIFICANT CHANGE UP (ref 5–17)
APTT BLD: 121.7 SEC — CRITICAL HIGH (ref 27.5–37.4)
APTT BLD: 94.6 SEC — HIGH (ref 27.5–37.4)
BUN SERPL-MCNC: 18 MG/DL — SIGNIFICANT CHANGE UP (ref 7–23)
CALCIUM SERPL-MCNC: 9.7 MG/DL — SIGNIFICANT CHANGE UP (ref 8.4–10.5)
CHLORIDE SERPL-SCNC: 97 MMOL/L — SIGNIFICANT CHANGE UP (ref 96–108)
CO2 SERPL-SCNC: 24 MMOL/L — SIGNIFICANT CHANGE UP (ref 22–31)
CREAT SERPL-MCNC: 1.3 MG/DL — SIGNIFICANT CHANGE UP (ref 0.5–1.3)
GLUCOSE SERPL-MCNC: 101 MG/DL — HIGH (ref 70–99)
HCT VFR BLD CALC: 43.8 % — SIGNIFICANT CHANGE UP (ref 39–50)
HGB BLD-MCNC: 14.6 G/DL — SIGNIFICANT CHANGE UP (ref 13–17)
MAGNESIUM SERPL-MCNC: 1.6 MG/DL — SIGNIFICANT CHANGE UP (ref 1.6–2.6)
MCHC RBC-ENTMCNC: 28.8 PG — SIGNIFICANT CHANGE UP (ref 27–34)
MCHC RBC-ENTMCNC: 33.3 G/DL — SIGNIFICANT CHANGE UP (ref 32–36)
MCV RBC AUTO: 86.4 FL — SIGNIFICANT CHANGE UP (ref 80–100)
PLATELET # BLD AUTO: 315 K/UL — SIGNIFICANT CHANGE UP (ref 150–400)
POTASSIUM SERPL-MCNC: 4.2 MMOL/L — SIGNIFICANT CHANGE UP (ref 3.5–5.3)
POTASSIUM SERPL-SCNC: 4.2 MMOL/L — SIGNIFICANT CHANGE UP (ref 3.5–5.3)
RBC # BLD: 5.07 M/UL — SIGNIFICANT CHANGE UP (ref 4.2–5.8)
RBC # FLD: 13.6 % — SIGNIFICANT CHANGE UP (ref 10.3–16.9)
SODIUM SERPL-SCNC: 134 MMOL/L — LOW (ref 135–145)
WBC # BLD: 5.1 K/UL — SIGNIFICANT CHANGE UP (ref 3.8–10.5)
WBC # FLD AUTO: 5.1 K/UL — SIGNIFICANT CHANGE UP (ref 3.8–10.5)

## 2018-04-25 PROCEDURE — 99232 SBSQ HOSP IP/OBS MODERATE 35: CPT

## 2018-04-25 PROCEDURE — 93306 TTE W/DOPPLER COMPLETE: CPT | Mod: 26

## 2018-04-25 RX ORDER — MAGNESIUM SULFATE 500 MG/ML
2 VIAL (ML) INJECTION ONCE
Qty: 0 | Refills: 0 | Status: COMPLETED | OUTPATIENT
Start: 2018-04-25 | End: 2018-04-25

## 2018-04-25 RX ORDER — HEPARIN SODIUM 5000 [USP'U]/ML
1000 INJECTION INTRAVENOUS; SUBCUTANEOUS
Qty: 25000 | Refills: 0 | Status: DISCONTINUED | OUTPATIENT
Start: 2018-04-25 | End: 2018-04-28

## 2018-04-25 RX ADMIN — ATORVASTATIN CALCIUM 80 MILLIGRAM(S): 80 TABLET, FILM COATED ORAL at 21:37

## 2018-04-25 RX ADMIN — Medication 50 GRAM(S): at 18:31

## 2018-04-25 RX ADMIN — SENNA PLUS 1 TABLET(S): 8.6 TABLET ORAL at 18:31

## 2018-04-25 RX ADMIN — ESCITALOPRAM OXALATE 5 MILLIGRAM(S): 10 TABLET, FILM COATED ORAL at 18:32

## 2018-04-25 RX ADMIN — Medication 81 MILLIGRAM(S): at 18:31

## 2018-04-25 RX ADMIN — Medication 100 MILLIGRAM(S): at 18:31

## 2018-04-25 RX ADMIN — DONEPEZIL HYDROCHLORIDE 5 MILLIGRAM(S): 10 TABLET, FILM COATED ORAL at 21:37

## 2018-04-25 NOTE — PROGRESS NOTE ADULT - ASSESSMENT
A 51yo M with no significant PMH presented initially to [place] after being found down and found to have acute left M2 occlusion s/p tPA transferred from [facility] to [facility] for thrombectomy now s/p thrombectomy. Course complicated as found to have large L atrial appendage mass/clot, on heparin gtt. Repeat BRANDON suggestive of LA clot breakdown/decrease in size. In the setting that patient is high risk for embolization of clot, for which patient remains on hep gtt and q4h neuro checks. Patient NPO after midnight for pending BRANDON today. Pending further evaluation of clot for further management.

## 2018-04-25 NOTE — PROGRESS NOTE ADULT - PROBLEM SELECTOR PLAN 5
Cr noted to be elevated- has remained stable. Likely underlying CKD stage 2. Cr at 1.3 today.  -c/w monitoring Cr and UOP.

## 2018-04-25 NOTE — PROGRESS NOTE ADULT - PROBLEM SELECTOR PLAN 2
As above notable anticardiolipin positive antibody for which Heme/Onc (Jennifer) following.   - c/w hep gtt- now at 10cc/hr given supra therapeutic this AM. Repeat PTT at noon.   -Repeat BRANDON today for evaluation of clot size.

## 2018-04-25 NOTE — PROGRESS NOTE ADULT - SUBJECTIVE AND OBJECTIVE BOX
PROGRESS NOTE    CC: L MCA s/p thrombectomy, with LA thrombus   Overnight Events: Supratherapeutic this AM. Otherwise no events.  Interval History: No complaints.   ROS: Denies headaches, dizziness, vision changes, cough, fevers, chills, chest pain or shortness of breath. No abdominal pain.    OBJECTIVE  Vitals:  T(C): 37 (04-25-18 @ 06:00), Max: 37 (04-25-18 @ 06:00)  HR: 56 (04-25-18 @ 06:34) (56 - 72)  BP: 122/73 (04-25-18 @ 06:34) (118/65 - 141/70)  RR: 18 (04-25-18 @ 06:34) (15 - 18)  SpO2: 97% (04-25-18 @ 06:34) (96% - 98%)  Wt(kg): --    I/O:  I&O's Summary    24 Apr 2018 07:01  -  25 Apr 2018 07:00  --------------------------------------------------------  IN: 220 mL / OUT: 585 mL / NET: -365 mL        PHYSICAL EXAM:  Appearance: NAD. Speaking in full sentences.   HEENT: PERRL.  Conjunctiva clear b/l. Moist oral mucosa.  Cardiovascular: RRR with no murmurs.  Respiratory: Lungs CTAB.   Gastrointestinal:  Soft, nontender. Non-distended. Non-rigid.	  Extremities: No edema b/l. No erythema b/l. LE WWP b/l.  Vascular: DP 2+ b/l.  Neurologic:  Alert and awake. Following commands. Making eye contact. R side facial droop improving, though still some asymmetry of lower facial muscles on R, EOMI. Sensation intact. 5/5 strength in the LUE (, elbow flex/ext, shoulder abd) and b/l lower extremities (hip flex, dorsi/plantar flex). RUE notable for 4/5 strength in shoulder abduction, elbow flex/ext but 1/5 in . Sensation otherwise intact. Pupillary light reflex intact.  	  LABS:                        14.6   5.1   )-----------( 315      ( 25 Apr 2018 06:15 )             43.8     04-25    134<L>  |  97  |  18  ----------------------------<  101<H>  4.2   |  24  |  1.30    Ca    9.7      25 Apr 2018 06:15  Mg     1.6     04-25      PTT - ( 25 Apr 2018 06:15 )  PTT:121.7 sec      RADIOLOGY & ADDITIONAL TESTS:  Reviewed .    MEDICATIONS  (STANDING):  aspirin enteric coated 81 milliGRAM(s) Oral daily  atorvastatin 80 milliGRAM(s) Oral at bedtime  docusate sodium 100 milliGRAM(s) Oral daily  donepezil 5 milliGRAM(s) Oral at bedtime  escitalopram 5 milliGRAM(s) Oral daily  heparin  Infusion 1100 Unit(s)/Hr (11 mL/Hr) IV Continuous <Continuous>  senna 1 Tablet(s) Oral daily    MEDICATIONS  (PRN):  acetaminophen   Tablet 650 milliGRAM(s) Oral every 6 hours PRN For Temp greater than 38 C (100.4 F)  acetaminophen   Tablet. 650 milliGRAM(s) Oral every 6 hours PRN Mild Pain (1 - 3)  polyethylene glycol 3350 17 Gram(s) Oral daily PRN Constipation

## 2018-04-25 NOTE — PROGRESS NOTE ADULT - PROBLEM SELECTOR PLAN 6
F: No IVF, PO intake  E: Replete electrolytes to maintain K>4 and Mg>2  N: NPO after midnight for BRANDON, DASH Diet as tolerated

## 2018-04-25 NOTE — PROGRESS NOTE ADULT - PROBLEM SELECTOR PLAN 1
MCA stroke s/p TPA, CT head notable for M2 occlusion s/p thrombectomy. A1c 5.1, HDL 65/LDL 90/ . Hypercoagulable work up performed and notable for + anticardiolipin and MTHFR gene mutation for which heme/onc following. Also of note, echocardiogram significant for left atrial thrombus/mass for which patient remains on hep gtt. Repeat BRANDON demonstrated improving size of clot with noted increasing hypoechogenicity within the clot suggestive of lysis/breakdown. Remains on hep gtt for management (no CTS intervention). Given increased risk for embolization of clot, patient remains on 7LACH for neuro monitoring. Neuro exam improving- RUE strength- notable for 4/5 strength in elbow flex/ext and shoulder abd. Deficits in /finger movement 1/5. Facial droop on R side improving though present.  - c/w hep gtt with PTT goal 60-90. PTT elevated this AM to 121- decreased to 10cc/hr and will repeat PTT.  - C/w Lipitor and aspirin 81mg PO.  - SBP goal < 160, SBP as remained stable between 120-40s.   - c/w monitoring q4h neuro checks  - + hypercoaguable/ anticardiolipin+/MTHFR, remains on Hep gtt   - c/w lexapro for motor recovery and atricept for neurosignaling s/p CVA  - c/w PT to work with patient on upper body motor. Bed rest and out of bed to chair, reduce walking around. Incentive spirometry at bedside.

## 2018-04-25 NOTE — PROGRESS NOTE ADULT - PROBLEM SELECTOR PLAN 7
VTE: Hep gtt, SCDs    FULL CODE    Dispo: Admitted to [facility] for management of post CVA and hep gtt for Left atrial thrombus. Given increased risk for embolization, patient remains on hep gtt and q4h neuro monitoring. Repeat BRANDON pending today for further evaluation of LA clot size.

## 2018-04-25 NOTE — PROGRESS NOTE ADULT - ATTENDING COMMENTS
Patient seen and examined.  Agree with above.  He's doing well in terms of being able to raise his right arm, though mainly from his shoulder.  He has reasonable strength in his right leg.  Elevated PTT overnight even though he was on the same dose of heparin drip but no bleeding in urine or stool.  The dose was adjusted with follow up PTTs.  Goal 40-80.  BRANDON today to evaluate the size of his atrial thrombus then will need input from Dr. Mcfadden in regard to next step.  Continue exercise in bed and in chair.

## 2018-04-26 LAB
ANION GAP SERPL CALC-SCNC: 11 MMOL/L — SIGNIFICANT CHANGE UP (ref 5–17)
APTT BLD: 79.8 SEC — HIGH (ref 27.5–37.4)
BUN SERPL-MCNC: 21 MG/DL — SIGNIFICANT CHANGE UP (ref 7–23)
CALCIUM SERPL-MCNC: 10 MG/DL — SIGNIFICANT CHANGE UP (ref 8.4–10.5)
CHLORIDE SERPL-SCNC: 96 MMOL/L — SIGNIFICANT CHANGE UP (ref 96–108)
CO2 SERPL-SCNC: 27 MMOL/L — SIGNIFICANT CHANGE UP (ref 22–31)
CREAT SERPL-MCNC: 1.53 MG/DL — HIGH (ref 0.5–1.3)
GLUCOSE SERPL-MCNC: 95 MG/DL — SIGNIFICANT CHANGE UP (ref 70–99)
HCT VFR BLD CALC: 46.1 % — SIGNIFICANT CHANGE UP (ref 39–50)
HGB BLD-MCNC: 15.1 G/DL — SIGNIFICANT CHANGE UP (ref 13–17)
MAGNESIUM SERPL-MCNC: 1.9 MG/DL — SIGNIFICANT CHANGE UP (ref 1.6–2.6)
MCHC RBC-ENTMCNC: 28.4 PG — SIGNIFICANT CHANGE UP (ref 27–34)
MCHC RBC-ENTMCNC: 32.8 G/DL — SIGNIFICANT CHANGE UP (ref 32–36)
MCV RBC AUTO: 86.7 FL — SIGNIFICANT CHANGE UP (ref 80–100)
PLATELET # BLD AUTO: 323 K/UL — SIGNIFICANT CHANGE UP (ref 150–400)
POTASSIUM SERPL-MCNC: 4.3 MMOL/L — SIGNIFICANT CHANGE UP (ref 3.5–5.3)
POTASSIUM SERPL-SCNC: 4.3 MMOL/L — SIGNIFICANT CHANGE UP (ref 3.5–5.3)
RBC # BLD: 5.32 M/UL — SIGNIFICANT CHANGE UP (ref 4.2–5.8)
RBC # FLD: 13.7 % — SIGNIFICANT CHANGE UP (ref 10.3–16.9)
SODIUM SERPL-SCNC: 134 MMOL/L — LOW (ref 135–145)
WBC # BLD: 5.4 K/UL — SIGNIFICANT CHANGE UP (ref 3.8–10.5)
WBC # FLD AUTO: 5.4 K/UL — SIGNIFICANT CHANGE UP (ref 3.8–10.5)

## 2018-04-26 PROCEDURE — 99233 SBSQ HOSP IP/OBS HIGH 50: CPT

## 2018-04-26 PROCEDURE — 75573 CT HRT C+ STRUX CGEN HRT DS: CPT | Mod: 26

## 2018-04-26 RX ORDER — SODIUM CHLORIDE 9 MG/ML
1000 INJECTION INTRAMUSCULAR; INTRAVENOUS; SUBCUTANEOUS
Qty: 0 | Refills: 0 | Status: DISCONTINUED | OUTPATIENT
Start: 2018-04-26 | End: 2018-04-26

## 2018-04-26 RX ORDER — SODIUM CHLORIDE 9 MG/ML
250 INJECTION INTRAMUSCULAR; INTRAVENOUS; SUBCUTANEOUS ONCE
Qty: 0 | Refills: 0 | Status: COMPLETED | OUTPATIENT
Start: 2018-04-26 | End: 2018-04-26

## 2018-04-26 RX ADMIN — DONEPEZIL HYDROCHLORIDE 5 MILLIGRAM(S): 10 TABLET, FILM COATED ORAL at 22:13

## 2018-04-26 RX ADMIN — HEPARIN SODIUM 10 UNIT(S)/HR: 5000 INJECTION INTRAVENOUS; SUBCUTANEOUS at 19:44

## 2018-04-26 RX ADMIN — Medication 81 MILLIGRAM(S): at 11:43

## 2018-04-26 RX ADMIN — SENNA PLUS 1 TABLET(S): 8.6 TABLET ORAL at 11:43

## 2018-04-26 RX ADMIN — SODIUM CHLORIDE 100 MILLILITER(S): 9 INJECTION INTRAMUSCULAR; INTRAVENOUS; SUBCUTANEOUS at 12:32

## 2018-04-26 RX ADMIN — SODIUM CHLORIDE 500 MILLILITER(S): 9 INJECTION INTRAMUSCULAR; INTRAVENOUS; SUBCUTANEOUS at 12:33

## 2018-04-26 RX ADMIN — Medication 100 MILLIGRAM(S): at 11:43

## 2018-04-26 RX ADMIN — ATORVASTATIN CALCIUM 80 MILLIGRAM(S): 80 TABLET, FILM COATED ORAL at 22:13

## 2018-04-26 RX ADMIN — ESCITALOPRAM OXALATE 5 MILLIGRAM(S): 10 TABLET, FILM COATED ORAL at 11:43

## 2018-04-26 NOTE — PROGRESS NOTE ADULT - ASSESSMENT
A 53yo M with no significant PMH presented initially to [place] after being found down and found to have acute left M2 occlusion s/p tPA transferred from [place] [facility] to [facility] s/p thrombectomy. Course complicated as found to have large L atrial appendage mass/clot, on heparin gtt. Initial repeat BRANDON suggestive of LA clot breakdown/decrease in size and repeat yesterday demonstrated no change from last. Patient remains on hep gtt and pending further decision regarding management of clot with unchanged size. Patient remains on frequent neuro checks and hep gtt at this time as he remains a high risk for embolization of clot.

## 2018-04-26 NOTE — PROGRESS NOTE ADULT - PROBLEM SELECTOR PLAN 5
Cr noted to be elevated- has remained stable. Likely underlying CKD stage 2. Cr at 1.5 today.  -c/w monitoring Cr and UOP.

## 2018-04-26 NOTE — PROGRESS NOTE ADULT - PROBLEM SELECTOR PLAN 7
VTE: Hep gtt, SCDs    FULL CODE    Dispo: Admitted to [facility] for management of post CVA and hep gtt for Left atrial thrombus. Given increased risk for embolization, patient remains on hep gtt and q4h neuro monitoring. Pending further decision on intervention/management for LA clot for dispo.

## 2018-04-26 NOTE — PROGRESS NOTE ADULT - PROBLEM SELECTOR PLAN 2
As above notable anticardiolipin positive antibody for which Heme/Onc (Jennifer) was previously consulted for evaluation.   - c/w hep gtt at 10cc/hr, therapeutic x3 today. PTT goal 60-90.  -BRANDON performed yesterday demonstrates no change in size. Pending further decision on management.

## 2018-04-26 NOTE — PROGRESS NOTE ADULT - SUBJECTIVE AND OBJECTIVE BOX
PROGRESS NOTE    CC: L MCA CVA s/p thrombectomy.  Overnight Events: Noted some conjunctival injection, otherwise no acute events. Remains therapeutic on hep gtt.  Interval History: No complaints this AM. Feels disappointed with unchanged clot size. Denies headaches, dizziness, vision changes, chest pain or shortness of breath.  ROS: As above.    OBJECTIVE  Vitals:  T(C): 36.3 (04-26-18 @ 05:05), Max: 37.1 (04-25-18 @ 18:11)  HR: 62 (04-26-18 @ 05:00) (56 - 64)  BP: 120/66 (04-26-18 @ 05:00) (112/74 - 131/78)  RR: 16 (04-26-18 @ 05:00) (16 - 18)  SpO2: 98% (04-26-18 @ 05:00) (96% - 98%)  Wt(kg): --    I/O:  I&O's Summary    25 Apr 2018 07:01  -  26 Apr 2018 07:00  --------------------------------------------------------  IN: 150 mL / OUT: 0 mL / NET: 150 mL        PHYSICAL EXAM:  Appearance: NAD. Speaking in full sentences.   HEENT: PERRL.  Conjunctival injection on R eye. Moist oral mucosa.  Cardiovascular: RRR S1, S2 with no murmurs.  Respiratory: Lungs CTAB.   Gastrointestinal:  Soft, nontender. Non-distended. Non-rigid.	  Extremities: No edema b/l. No erythema b/l. LE WWP b/l.  Vascular: DP 2+ b/l.  Neurologic:  Alert and awake. Following commands, making eye contact. EOMI. Pupillary light reflex intact. Facial muscle with asymmetric rise with R side droop with eyebrow rise intact (though noted improvement). Sensation intact. Shoulder shrug improving, R decreased compared to left. 4/5 proximal shoulder abduction, 2/5 elbow flex, 1/5 elbow ext, 1/5  strength on R side. 5/5 strength in L upper extremity and b/l lower extremity. Sensation intact.   	  LABS:                        15.1   5.4   )-----------( 323      ( 26 Apr 2018 06:26 )             46.1     04-26    134<L>  |  96  |  21  ----------------------------<  95  4.3   |  27  |  1.53<H>    Ca    10.0      26 Apr 2018 06:26  Mg     1.9     04-26      PTT - ( 26 Apr 2018 06:26 )  PTT:79.8 sec      RADIOLOGY & ADDITIONAL TESTS:  Reviewed .    MEDICATIONS  (STANDING):  aspirin enteric coated 81 milliGRAM(s) Oral daily  atorvastatin 80 milliGRAM(s) Oral at bedtime  docusate sodium 100 milliGRAM(s) Oral daily  donepezil 5 milliGRAM(s) Oral at bedtime  escitalopram 5 milliGRAM(s) Oral daily  heparin  Infusion 1000 Unit(s)/Hr (10 mL/Hr) IV Continuous <Continuous>  senna 1 Tablet(s) Oral daily    MEDICATIONS  (PRN):  acetaminophen   Tablet 650 milliGRAM(s) Oral every 6 hours PRN For Temp greater than 38 C (100.4 F)  acetaminophen   Tablet. 650 milliGRAM(s) Oral every 6 hours PRN Mild Pain (1 - 3)  polyethylene glycol 3350 17 Gram(s) Oral daily PRN Constipation      ASSESSMENT:    PLAN: PROGRESS NOTE    CC: L MCA CVA s/p thrombectomy. course complicated by LA clot.  Overnight Events: Noted some conjunctival injection, otherwise no acute events. Remains therapeutic on hep gtt. BRANDON performed yesterday and demonstrated no change in size of LA clot.   Interval History: No complaints this AM. Feels disappointed with unchanged clot size. Denies headaches, dizziness, vision changes, chest pain or shortness of breath.  ROS: As above.    OBJECTIVE  Vitals:  T(C): 36.3 (04-26-18 @ 05:05), Max: 37.1 (04-25-18 @ 18:11)  HR: 62 (04-26-18 @ 05:00) (56 - 64)  BP: 120/66 (04-26-18 @ 05:00) (112/74 - 131/78)  RR: 16 (04-26-18 @ 05:00) (16 - 18)  SpO2: 98% (04-26-18 @ 05:00) (96% - 98%)  Wt(kg): --    I/O:  I&O's Summary    25 Apr 2018 07:01  -  26 Apr 2018 07:00  --------------------------------------------------------  IN: 150 mL / OUT: 0 mL / NET: 150 mL        PHYSICAL EXAM:  Appearance: NAD. Speaking in full sentences.   HEENT: PERRL.  Conjunctival injection on R eye. Moist oral mucosa.  Cardiovascular: RRR S1, S2 with no murmurs.  Respiratory: Lungs CTAB.   Gastrointestinal:  Soft, nontender. Non-distended. Non-rigid.	  Extremities: No edema b/l. No erythema b/l. LE WWP b/l.  Vascular: DP 2+ b/l.  Neurologic:  Alert and awake. Following commands, making eye contact. EOMI. Pupillary light reflex intact. Facial muscle with asymmetric rise with R side droop with eyebrow rise intact (though noted improvement). Soft palate rise symmetric with no tongue deviation. Sensation intact. R decreased compared to left. 4/5 proximal shoulder abduction, 2/5 elbow flex, 1/5 elbow ext, 1/5  strength on R side. 5/5 strength in L upper extremity and b/l lower extremity. Sensation intact.   	  LABS:                        15.1   5.4   )-----------( 323      ( 26 Apr 2018 06:26 )             46.1     04-26    134<L>  |  96  |  21  ----------------------------<  95  4.3   |  27  |  1.53<H>    Ca    10.0      26 Apr 2018 06:26  Mg     1.9     04-26      PTT - ( 26 Apr 2018 06:26 )  PTT:79.8 sec      RADIOLOGY & ADDITIONAL TESTS:  Reviewed .    MEDICATIONS  (STANDING):  aspirin enteric coated 81 milliGRAM(s) Oral daily  atorvastatin 80 milliGRAM(s) Oral at bedtime  docusate sodium 100 milliGRAM(s) Oral daily  donepezil 5 milliGRAM(s) Oral at bedtime  escitalopram 5 milliGRAM(s) Oral daily  heparin  Infusion 1000 Unit(s)/Hr (10 mL/Hr) IV Continuous <Continuous>  senna 1 Tablet(s) Oral daily    MEDICATIONS  (PRN):  acetaminophen   Tablet 650 milliGRAM(s) Oral every 6 hours PRN For Temp greater than 38 C (100.4 F)  acetaminophen   Tablet. 650 milliGRAM(s) Oral every 6 hours PRN Mild Pain (1 - 3)  polyethylene glycol 3350 17 Gram(s) Oral daily PRN Constipation      ASSESSMENT:    PLAN: PROGRESS NOTE    CC: L MCA CVA s/p thrombectomy. course complicated by LA clot.  Overnight Events: Noted some conjunctival injection, otherwise no acute events. Remains therapeutic on hep gtt. BRANDON performed yesterday and demonstrated no change in size of LA clot.   Interval History: No complaints this AM. Feels disappointed with unchanged clot size. Denies headaches, dizziness, vision changes, chest pain or shortness of breath.  ROS: As above.    OBJECTIVE  Vitals:  T(C): 36.3 (04-26-18 @ 05:05), Max: 37.1 (04-25-18 @ 18:11)  HR: 62 (04-26-18 @ 05:00) (56 - 64)  BP: 120/66 (04-26-18 @ 05:00) (112/74 - 131/78)  RR: 16 (04-26-18 @ 05:00) (16 - 18)  SpO2: 98% (04-26-18 @ 05:00) (96% - 98%)  Wt(kg): --    I/O:  I&O's Summary    25 Apr 2018 07:01  -  26 Apr 2018 07:00  --------------------------------------------------------  IN: 150 mL / OUT: 0 mL / NET: 150 mL        PHYSICAL EXAM:  Appearance: NAD. Speaking in full sentences.   HEENT: PERRL.  Conjunctival injection on R eye. Moist oral mucosa.  Cardiovascular: RRR S1, S2 with no murmurs.  Respiratory: Lungs CTAB.   Gastrointestinal:  Soft, nontender. Non-distended. Non-rigid.	  Extremities: No edema b/l. No erythema b/l. LE WWP b/l.  Vascular: DP 2+ b/l.  Neurologic:  Alert and awake. Following commands, making eye contact. EOMI. Pupillary light reflex intact. Facial muscle with asymmetric rise with R side droop with eyebrow rise intact (though noted improvement). Soft palate rise symmetric with no tongue deviation. Sensation intact. R decreased compared to left. 4/5 proximal shoulder abduction, 1/5 elbow flex, 1/5 elbow ext, 1/5  strength on R side. 5/5 strength in L upper extremity and b/l lower extremity. Sensation intact.   	  LABS:                        15.1   5.4   )-----------( 323      ( 26 Apr 2018 06:26 )             46.1     04-26    134<L>  |  96  |  21  ----------------------------<  95  4.3   |  27  |  1.53<H>    Ca    10.0      26 Apr 2018 06:26  Mg     1.9     04-26      PTT - ( 26 Apr 2018 06:26 )  PTT:79.8 sec      RADIOLOGY & ADDITIONAL TESTS:  Reviewed .    MEDICATIONS  (STANDING):  aspirin enteric coated 81 milliGRAM(s) Oral daily  atorvastatin 80 milliGRAM(s) Oral at bedtime  docusate sodium 100 milliGRAM(s) Oral daily  donepezil 5 milliGRAM(s) Oral at bedtime  escitalopram 5 milliGRAM(s) Oral daily  heparin  Infusion 1000 Unit(s)/Hr (10 mL/Hr) IV Continuous <Continuous>  senna 1 Tablet(s) Oral daily    MEDICATIONS  (PRN):  acetaminophen   Tablet 650 milliGRAM(s) Oral every 6 hours PRN For Temp greater than 38 C (100.4 F)  acetaminophen   Tablet. 650 milliGRAM(s) Oral every 6 hours PRN Mild Pain (1 - 3)  polyethylene glycol 3350 17 Gram(s) Oral daily PRN Constipation      ASSESSMENT:    PLAN:

## 2018-04-26 NOTE — PROGRESS NOTE ADULT - PROBLEM SELECTOR PLAN 3
BRANDON demonstrated L atrial clot for which patient started on hep gtt as above. 2nd BRANDON demonstrated some improving size of clot/decreasing in size and more notable hypoechogenicity for which suggestive of lysis/breakdown within the clot. 3rd BRANDON performed yesterday without change in size noted in clot. Patient remains on hep gtt and q4h neuro checks while pending further management decision- if further intervention required vs dispo.    -hep gtt with continued neuro monitoring q4h as patient remains high embolization risk  - c/w hep gtt at 10cc/hr and has remained therapeutic (Goal 60-90 PTT)   - working with PT on upper body focused, out of bed to chair and limited to bed rest with concern for at risk for embolization of clot.

## 2018-04-26 NOTE — PROGRESS NOTE ADULT - ATTENDING COMMENTS
Patient seen and examined.  Agree with above.  No acute complaints.    Reviewed BRANDON with Dr. Mcfadden and Cardiology team - left atrial clot still present with relatively similar size.  Some question regarding its substrate so Cardiology will indicate whether CT or MR is the correct next test.  - If atrial myxoma then would encourage intervention now.  Continue anticoagulation with heparin gtt for now until final decision regarding treatment.    His neurological exam is stable with ability to raise his right arm.

## 2018-04-26 NOTE — PROGRESS NOTE ADULT - PROBLEM SELECTOR PLAN 1
MCA stroke s/p TPA, CT head notable for M2 occlusion s/p thrombectomy. A1c 5.1, HDL 65/LDL 90/ . Hypercoagulable work up performed and notable for + anticardiolipin and MTHFR gene mutation for which he was evalauted by heme/onc. Noted to have left atrial appendage thrombus/mass for which patient remains on hep gtt. 2nd BRANDON (on 4/18) demonstrated decrease in size from initial study with some suggestion of breakdown within the clot. 3rd BRANDON performed yesterday with no gross changes in clot size. Patient remains therapeutic on hep gtt while pending further decision for management. Patient remains an increased risk for embolization of clot. Therefore remains admitted to [facility] for frequent neuro monitoring.   -Neuro exam has grossly improved since initial exam. RUE strength- notable for 4/5 strength in elbow flex/ext and shoulder abd. Deficits in /finger movement 1/5. Facial droop on R side improving though present.  - c/w hep gtt at 10cc/hr with PTT goal 60-90.  - C/w Lipitor and aspirin 81mg PO.  - SBP goal < 160, SBP as remained stable between 120-40s.   - c/w monitoring q4h neuro checks  - + hypercoaguable/ anticardiolipin+/MTHFR, remains on Hep gtt   - c/w lexapro for motor recovery and atricept for neurosignaling s/p CVA  - c/w PT to work with patient on upper body motor. Bed rest and out of bed to chair, reduce walking around. Incentive spirometry at bedside.

## 2018-04-27 LAB
ANION GAP SERPL CALC-SCNC: 8 MMOL/L — SIGNIFICANT CHANGE UP (ref 5–17)
APTT BLD: 75.5 SEC — HIGH (ref 27.5–37.4)
APTT BLD: 90.4 SEC — HIGH (ref 27.5–37.4)
BUN SERPL-MCNC: 18 MG/DL — SIGNIFICANT CHANGE UP (ref 7–23)
CALCIUM SERPL-MCNC: 9.9 MG/DL — SIGNIFICANT CHANGE UP (ref 8.4–10.5)
CHLORIDE SERPL-SCNC: 99 MMOL/L — SIGNIFICANT CHANGE UP (ref 96–108)
CO2 SERPL-SCNC: 31 MMOL/L — SIGNIFICANT CHANGE UP (ref 22–31)
CREAT SERPL-MCNC: 1.41 MG/DL — HIGH (ref 0.5–1.3)
GLUCOSE SERPL-MCNC: 98 MG/DL — SIGNIFICANT CHANGE UP (ref 70–99)
INR BLD: 1.12 — SIGNIFICANT CHANGE UP (ref 0.88–1.16)
MAGNESIUM SERPL-MCNC: 1.8 MG/DL — SIGNIFICANT CHANGE UP (ref 1.6–2.6)
POTASSIUM SERPL-MCNC: 4.6 MMOL/L — SIGNIFICANT CHANGE UP (ref 3.5–5.3)
POTASSIUM SERPL-SCNC: 4.6 MMOL/L — SIGNIFICANT CHANGE UP (ref 3.5–5.3)
PROTHROM AB SERPL-ACNC: 12.5 SEC — SIGNIFICANT CHANGE UP (ref 9.8–12.7)
SODIUM SERPL-SCNC: 138 MMOL/L — SIGNIFICANT CHANGE UP (ref 135–145)

## 2018-04-27 PROCEDURE — 99233 SBSQ HOSP IP/OBS HIGH 50: CPT

## 2018-04-27 RX ORDER — MAGNESIUM SULFATE 500 MG/ML
1 VIAL (ML) INJECTION ONCE
Qty: 0 | Refills: 0 | Status: COMPLETED | OUTPATIENT
Start: 2018-04-27 | End: 2018-04-27

## 2018-04-27 RX ORDER — ASPIRIN/CALCIUM CARB/MAGNESIUM 324 MG
81 TABLET ORAL DAILY
Qty: 0 | Refills: 0 | Status: DISCONTINUED | OUTPATIENT
Start: 2018-04-27 | End: 2018-05-01

## 2018-04-27 RX ORDER — WARFARIN SODIUM 2.5 MG/1
5 TABLET ORAL ONCE
Qty: 0 | Refills: 0 | Status: COMPLETED | OUTPATIENT
Start: 2018-04-27 | End: 2018-04-27

## 2018-04-27 RX ADMIN — DONEPEZIL HYDROCHLORIDE 5 MILLIGRAM(S): 10 TABLET, FILM COATED ORAL at 21:32

## 2018-04-27 RX ADMIN — Medication 81 MILLIGRAM(S): at 12:06

## 2018-04-27 RX ADMIN — Medication 100 GRAM(S): at 09:20

## 2018-04-27 RX ADMIN — SENNA PLUS 1 TABLET(S): 8.6 TABLET ORAL at 12:06

## 2018-04-27 RX ADMIN — Medication 100 MILLIGRAM(S): at 12:07

## 2018-04-27 RX ADMIN — WARFARIN SODIUM 5 MILLIGRAM(S): 2.5 TABLET ORAL at 21:31

## 2018-04-27 RX ADMIN — HEPARIN SODIUM 10 UNIT(S)/HR: 5000 INJECTION INTRAVENOUS; SUBCUTANEOUS at 18:41

## 2018-04-27 RX ADMIN — ESCITALOPRAM OXALATE 5 MILLIGRAM(S): 10 TABLET, FILM COATED ORAL at 12:05

## 2018-04-27 RX ADMIN — ATORVASTATIN CALCIUM 80 MILLIGRAM(S): 80 TABLET, FILM COATED ORAL at 21:32

## 2018-04-27 NOTE — PROGRESS NOTE ADULT - PROBLEM SELECTOR PLAN 2
As above notable anticardiolipin positive antibody for which Heme/Onc (Jennifer) was previously consulted for evaluation.   - c/w hep gtt at 10cc/hr, PTT goal 60-90.  -BRANDON performed demonstrated no change in size. CT cardiac structural demonstrates confirmed clot. Pending further decision on management.

## 2018-04-27 NOTE — PROGRESS NOTE ADULT - ATTENDING COMMENTS
Patient seen and examined with Resident.  Agree with above.  Discussed plan with Dr. Mcfadden based on new findings of CT that confirmed that patient has atrial thrombus -   - Start coumadin 5mg tonight with heparin gtt PTT 60-90 for anticoagulation  - Start Aspirin 81mg for extra antiplatelet  Discharge planning in process to acute rehab

## 2018-04-27 NOTE — PROGRESS NOTE ADULT - PROBLEM SELECTOR PLAN 7
VTE: Hep gtt, SCDs    FULL CODE    Dispo: Admitted to [facility] for management of post CVA and hep gtt for Left atrial thrombus. Given increased risk for embolization, patient remains on hep gtt and q4h neuro monitoring. Pending further decision on intervention/management for LA clot for dispo.

## 2018-04-27 NOTE — PROGRESS NOTE ADULT - PROBLEM SELECTOR PLAN 3
BRANDON demonstrated L atrial clot for which patient started on hep gtt as above. 2nd BRANDON demonstrated some improving size of clot/decreasing in size and more notable hypoechogenicity for which suggestive of lysis/breakdown within the clot. 3rd BRANDON (3/25) without change in size noted in clot. CT cardiac structural confirmed clot (not tissue). Patient remains on hep gtt and q4h neuro checks while pending further intervention/management decision in collaboration with neuro/CTS/Cardio.  -hep gtt with continued neuro monitoring q4h as patient remains high embolization risk  - c/w hep gtt at 10cc/hr and has remained therapeutic (Goal 60-90 PTT)   - working with PT on upper body focused, out of bed to chair and limited to bed rest with concern for at risk for embolization of clot.

## 2018-04-27 NOTE — PROGRESS NOTE ADULT - PROBLEM SELECTOR PLAN 1
MCA stroke s/p TPA, CT head notable for M2 occlusion s/p thrombectomy. A1c 5.1, HDL 65/LDL 90/ . Hypercoagulable work up performed and notable for + anticardiolipin and MTHFR gene mutation for which he was evaluated by heme/onc. Noted to have left atrial appendage thrombus/mass for which patient remains on hep gtt. 2nd BRANDON (on 4/18) demonstrated decrease in size from initial study with some suggestion of breakdown within the clot. 3rd BRANDON performed with no gross changes in clot size. CT cardiac structural demonstrated confirmed clot. Pending further decision on management of clot but for now will stay on hep gtt and frequent neuro checks for increased risk for embolization of clot.   -Neuro exam unchanged. RUE strength- notable for 4/5 strength in elbow flex/ext and shoulder abd. Deficits in /finger movement 1/5. Facial droop on R side improving though present.  - c/w hep gtt at 10cc/hr with PTT goal 60-90.  - C/w Lipitor and aspirin 81mg PO.  - SBP goal < 160, SBP as remained stable.  - c/w monitoring q4h neuro checks  - + hypercoaguable/ anticardiolipin+/MTHFR, remains on Hep gtt   - c/w lexapro for motor recovery and atricept for neurosignaling s/p CVA  - c/w PT to work with patient on upper body motor. Bed rest and out of bed to chair, reduce walking around. Incentive spirometry at bedside.

## 2018-04-27 NOTE — PROGRESS NOTE ADULT - ASSESSMENT
A 51yo M with no significant PMH presented initially to [place] after being found down and found to have acute left M2 occlusion s/p tPA transferred from [place] [facility] to [facility] s/p thrombectomy. Course complicated as found to have large L atrial appendage mass/clot, on heparin gtt. Initial repeat BRANDON suggestive of LA clot breakdown/decrease in size and repeat yesterday demonstrated no change from last. Patient remains on hep gtt and pending further decision regarding management of clot with unchanged size. Patient remains on frequent neuro checks and hep gtt at this time as he remains a high risk for embolization of clot.

## 2018-04-27 NOTE — PROGRESS NOTE ADULT - PROBLEM SELECTOR PLAN 4
Hx of palpitations, EKG notable for NSR with 1st degree AV block- evaluated as outpatient by cardiology. LA thrombus during course. Currently asymptomatic.

## 2018-04-27 NOTE — PROGRESS NOTE ADULT - PROBLEM SELECTOR PLAN 5
Cr noted to be elevated- has remained stable. Likely underlying CKD stage 2. Cr at 1.43 today.  -c/w monitoring Cr and UOP.

## 2018-04-27 NOTE — PROGRESS NOTE ADULT - SUBJECTIVE AND OBJECTIVE BOX
PROGRESS NOTE    CC: L MCA stroke s/p thrombectomy, LA clot.  Overnight Events: LIDA.  Interval History: No complaints currently. Denies headaches, dizziness, vision changes, shortness of breath, chest pain, palpitations, nausea, vomiting, abdominal pain.  ROS: As above.    OBJECTIVE  Vitals:  T(C): 36.6 (04-27-18 @ 05:00), Max: 37.1 (04-26-18 @ 13:27)  HR: 62 (04-27-18 @ 05:00) (56 - 70)  BP: 130/79 (04-27-18 @ 05:00) (121/72 - 156/65)  RR: 19 (04-27-18 @ 05:00) (14 - 19)  SpO2: 98% (04-27-18 @ 05:00) (97% - 99%)  Wt(kg): --    I/O:  I&O's Summary    26 Apr 2018 07:01  -  27 Apr 2018 07:00  --------------------------------------------------------  IN: 120 mL / OUT: 475 mL / NET: -355 mL        PHYSICAL EXAM:  Appearance: NAD. Speaking in full sentences.   HEENT:  PERRL. Conjunctiva clear b/l. Moist oral mucosa.  Cardiovascular: RRR S1, S2 with no murmurs.  Respiratory: Lungs CTAB.   Gastrointestinal:  Soft, nontender. Non-distended. Non-rigid.	  Extremities: No edema b/l. No erythema b/l. LE WWP b/l.  Vascular: DP 2+ b/l.  Neurologic: Alert and awake. Following commands, making eye contact. EOMI. Pupillary light reflex intact. Facial muscle with asymmetric rise with R side droop with eyebrow rise intact (though noted improvement). Soft palate rise symmetric with no tongue deviation. Sensation intact. R decreased compared to left. 4/5 proximal shoulder abduction, 1/5 elbow flex, 1/5 elbow ext, 1/5  strength on R side. 5/5 strength in L upper extremity and b/l lower extremity. Sensation intact.     	  LABS:                        15.1   5.4   )-----------( 323      ( 26 Apr 2018 06:26 )             46.1     04-27    138  |  99  |  18  ----------------------------<  98  4.6   |  31  |  1.41<H>    Ca    9.9      27 Apr 2018 06:45  Mg     1.8     04-27      PTT - ( 27 Apr 2018 06:45 )  PTT:90.4 sec      RADIOLOGY & ADDITIONAL TESTS:  Reviewed .    MEDICATIONS  (STANDING):  aspirin enteric coated 81 milliGRAM(s) Oral daily  atorvastatin 80 milliGRAM(s) Oral at bedtime  docusate sodium 100 milliGRAM(s) Oral daily  donepezil 5 milliGRAM(s) Oral at bedtime  escitalopram 5 milliGRAM(s) Oral daily  heparin  Infusion 1000 Unit(s)/Hr (10 mL/Hr) IV Continuous <Continuous>  senna 1 Tablet(s) Oral daily    MEDICATIONS  (PRN):  acetaminophen   Tablet 650 milliGRAM(s) Oral every 6 hours PRN For Temp greater than 38 C (100.4 F)  acetaminophen   Tablet. 650 milliGRAM(s) Oral every 6 hours PRN Mild Pain (1 - 3)  polyethylene glycol 3350 17 Gram(s) Oral daily PRN Constipation

## 2018-04-28 LAB
ANION GAP SERPL CALC-SCNC: 12 MMOL/L — SIGNIFICANT CHANGE UP (ref 5–17)
APTT BLD: 121.2 SEC — CRITICAL HIGH (ref 27.5–37.4)
APTT BLD: 53.1 SEC — HIGH (ref 27.5–37.4)
BUN SERPL-MCNC: 14 MG/DL — SIGNIFICANT CHANGE UP (ref 7–23)
CALCIUM SERPL-MCNC: 9.8 MG/DL — SIGNIFICANT CHANGE UP (ref 8.4–10.5)
CHLORIDE SERPL-SCNC: 100 MMOL/L — SIGNIFICANT CHANGE UP (ref 96–108)
CO2 SERPL-SCNC: 25 MMOL/L — SIGNIFICANT CHANGE UP (ref 22–31)
CREAT SERPL-MCNC: 1.21 MG/DL — SIGNIFICANT CHANGE UP (ref 0.5–1.3)
GLUCOSE SERPL-MCNC: 106 MG/DL — HIGH (ref 70–99)
INR BLD: 1.17 — HIGH (ref 0.88–1.16)
MAGNESIUM SERPL-MCNC: 1.7 MG/DL — SIGNIFICANT CHANGE UP (ref 1.6–2.6)
POTASSIUM SERPL-MCNC: 4 MMOL/L — SIGNIFICANT CHANGE UP (ref 3.5–5.3)
POTASSIUM SERPL-SCNC: 4 MMOL/L — SIGNIFICANT CHANGE UP (ref 3.5–5.3)
PROTHROM AB SERPL-ACNC: 13 SEC — HIGH (ref 9.8–12.7)
SODIUM SERPL-SCNC: 137 MMOL/L — SIGNIFICANT CHANGE UP (ref 135–145)

## 2018-04-28 PROCEDURE — 99231 SBSQ HOSP IP/OBS SF/LOW 25: CPT

## 2018-04-28 RX ORDER — WARFARIN SODIUM 2.5 MG/1
5 TABLET ORAL AT BEDTIME
Qty: 0 | Refills: 0 | Status: COMPLETED | OUTPATIENT
Start: 2018-04-28 | End: 2018-04-28

## 2018-04-28 RX ORDER — HEPARIN SODIUM 5000 [USP'U]/ML
1000 INJECTION INTRAVENOUS; SUBCUTANEOUS
Qty: 25000 | Refills: 0 | Status: DISCONTINUED | OUTPATIENT
Start: 2018-04-28 | End: 2018-04-29

## 2018-04-28 RX ORDER — MAGNESIUM SULFATE 500 MG/ML
2 VIAL (ML) INJECTION ONCE
Qty: 0 | Refills: 0 | Status: COMPLETED | OUTPATIENT
Start: 2018-04-28 | End: 2018-04-28

## 2018-04-28 RX ORDER — HEPARIN SODIUM 5000 [USP'U]/ML
500 INJECTION INTRAVENOUS; SUBCUTANEOUS ONCE
Qty: 0 | Refills: 0 | Status: COMPLETED | OUTPATIENT
Start: 2018-04-28 | End: 2018-04-28

## 2018-04-28 RX ORDER — HEPARIN SODIUM 5000 [USP'U]/ML
1100 INJECTION INTRAVENOUS; SUBCUTANEOUS
Qty: 25000 | Refills: 0 | Status: DISCONTINUED | OUTPATIENT
Start: 2018-04-28 | End: 2018-04-28

## 2018-04-28 RX ORDER — HEPARIN SODIUM 5000 [USP'U]/ML
1200 INJECTION INTRAVENOUS; SUBCUTANEOUS
Qty: 25000 | Refills: 0 | Status: DISCONTINUED | OUTPATIENT
Start: 2018-04-28 | End: 2018-04-28

## 2018-04-28 RX ADMIN — WARFARIN SODIUM 5 MILLIGRAM(S): 2.5 TABLET ORAL at 21:37

## 2018-04-28 RX ADMIN — SENNA PLUS 1 TABLET(S): 8.6 TABLET ORAL at 12:12

## 2018-04-28 RX ADMIN — DONEPEZIL HYDROCHLORIDE 5 MILLIGRAM(S): 10 TABLET, FILM COATED ORAL at 21:37

## 2018-04-28 RX ADMIN — HEPARIN SODIUM 12 UNIT(S)/HR: 5000 INJECTION INTRAVENOUS; SUBCUTANEOUS at 19:23

## 2018-04-28 RX ADMIN — HEPARIN SODIUM 12 UNIT(S)/HR: 5000 INJECTION INTRAVENOUS; SUBCUTANEOUS at 17:34

## 2018-04-28 RX ADMIN — Medication 81 MILLIGRAM(S): at 12:11

## 2018-04-28 RX ADMIN — HEPARIN SODIUM 500 UNIT(S): 5000 INJECTION INTRAVENOUS; SUBCUTANEOUS at 17:33

## 2018-04-28 RX ADMIN — HEPARIN SODIUM 11 UNIT(S)/HR: 5000 INJECTION INTRAVENOUS; SUBCUTANEOUS at 17:33

## 2018-04-28 RX ADMIN — ATORVASTATIN CALCIUM 80 MILLIGRAM(S): 80 TABLET, FILM COATED ORAL at 21:37

## 2018-04-28 RX ADMIN — ESCITALOPRAM OXALATE 5 MILLIGRAM(S): 10 TABLET, FILM COATED ORAL at 12:11

## 2018-04-28 RX ADMIN — Medication 50 GRAM(S): at 09:52

## 2018-04-28 RX ADMIN — Medication 100 MILLIGRAM(S): at 12:09

## 2018-04-28 NOTE — PROGRESS NOTE ADULT - ASSESSMENT
A 51yo M with no significant PMH presented initially to [place] after being found down and found to have acute left M2 occlusion s/p tPA transferred from [place] [facility] to [facility] s/p thrombectomy. Course complicated as found to have large L atrial appendage mass/clot, on heparin gtt. Initial repeat BRANDON suggestive of LA clot breakdown/decrease in size and repeat yesterday demonstrated no change from last. Patient remains on hep gtt and pending further decision regarding management of clot with unchanged size. Patient remains on frequent neuro checks and hep gtt at this time as he remains a high risk for embolization of clot.

## 2018-04-28 NOTE — PROGRESS NOTE ADULT - PROBLEM SELECTOR PLAN 3
BRANDON demonstrated L atrial clot for which patient started on hep gtt as above. 2nd BRANDON demonstrated some improving size of clot/decreasing in size and more notable hypoechogenicity for which suggestive of lysis/breakdown within the clot. 3rd BRANDON (3/25) without change in size noted in clot. CT cardiac structural confirmed clot (not tissue). Patient remains on hep gtt and q4h neuro checks while pending further intervention/management decision in collaboration with neuro/CTS/Cardio.  -hep gtt with continued neuro monitoring q4h as patient remains high embolization risk  - c/w hep gtt at 10cc/hr and has remained therapeutic (Goal 60-90 PTT)   - working with PT on upper body focused, out of bed to chair and limited to bed rest with concern for at risk for embolization of clot. BRANDON demonstrated L atrial clot for which patient started on hep gtt as above. 2nd BRANDON demonstrated some improving size of clot/decreasing in size and more notable hypoechogenicity for which suggestive of lysis/breakdown within the clot. 3rd BRANDON (3/25) without change in size noted in clot. CT cardiac structural confirmed clot (not tissue). Patient remains on hep gtt and q4h neuro checks while pending further intervention/management decision in collaboration with neuro/CTS/Cardio.  -hep gtt with continued neuro monitoring q4h as patient remains high embolization risk  - c/w hep gtt at 11cc/hr and has remained therapeutic (Goal 60-90 PTT), bridged with Coumadin 5mg daily  - working with PT on upper body focused, out of bed to chair and limited to bed rest with concern for at risk for embolization of clot.

## 2018-04-28 NOTE — PROGRESS NOTE ADULT - PROBLEM SELECTOR PLAN 2
As above notable anticardiolipin positive antibody for which Heme/Onc (Jennifer) was previously consulted for evaluation.   - c/w hep gtt at 10cc/hr, PTT goal 60-90.  -BRANDON performed demonstrated no change in size. CT cardiac structural demonstrates confirmed clot. Pending further decision on management. As above notable anticardiolipin positive antibody for which Heme/Onc (Jennifer) was previously consulted for evaluation.   - c/w hep gtt at 11cc/hr, PTT goal 60-90, bridged with Coumadin 5mg daily  -BRANDON performed demonstrated no change in size. CT cardiac structural demonstrates confirmed clot. Pending further decision on management.

## 2018-04-28 NOTE — PROGRESS NOTE ADULT - PROBLEM SELECTOR PLAN 5
Cr noted to be elevated- has remained stable. Likely underlying CKD stage 2. Cr at 1.43 today.  -c/w monitoring Cr and UOP. Cr noted to be elevated- has remained stable. Likely underlying CKD stage 2. Cr at 1.21 today. Resolving.   -c/w monitoring Cr and UOP.

## 2018-04-28 NOTE — PROGRESS NOTE ADULT - PROBLEM SELECTOR PLAN 7
VTE: Hep gtt, SCDs    FULL CODE    Dispo: Admitted to [facility] for management of post CVA and hep gtt for Left atrial thrombus. Given increased risk for embolization, patient remains on hep gtt and q4h neuro monitoring. Pending further decision on intervention/management for LA clot for dispo.

## 2018-04-28 NOTE — PROGRESS NOTE ADULT - SUBJECTIVE AND OBJECTIVE BOX
INTERVAL EVENTS:    OBJECTIVE:    Vital Signs Last 12 Hrs  T(F): 98.5 (04-28-18 @ 06:00), Max: 98.5 (04-28-18 @ 06:00)  HR: 54 (04-28-18 @ 05:05) (54 - 62)  BP: 123/76 (04-28-18 @ 05:05) (121/68 - 127/69)  BP(mean): 94 (04-28-18 @ 05:05) (87 - 94)  RR: 19 (04-28-18 @ 05:05) (16 - 19)  SpO2: 98% (04-28-18 @ 05:05) (97% - 98%)  I&O's Summary    27 Apr 2018 07:01  -  28 Apr 2018 07:00  --------------------------------------------------------  IN: 250 mL / OUT: 0 mL / NET: 250 mL        PHYSICAL EXAM:            LABS:    04-27    138  |  99  |  18  ----------------------------<  98  4.6   |  31  |  1.41<H>    Ca    9.9      27 Apr 2018 06:45  Mg     1.8     04-27      PT/INR - ( 27 Apr 2018 18:57 )   PT: 12.5 sec;   INR: 1.12          PTT - ( 27 Apr 2018 18:57 )  PTT:75.5 sec      RADIOLOGY & ADDITIONAL TESTS:    MEDICATIONS  (STANDING):  aspirin enteric coated 81 milliGRAM(s) Oral daily  atorvastatin 80 milliGRAM(s) Oral at bedtime  docusate sodium 100 milliGRAM(s) Oral daily  donepezil 5 milliGRAM(s) Oral at bedtime  escitalopram 5 milliGRAM(s) Oral daily  heparin  Infusion 1000 Unit(s)/Hr (10 mL/Hr) IV Continuous <Continuous>  senna 1 Tablet(s) Oral daily    MEDICATIONS  (PRN):  acetaminophen   Tablet 650 milliGRAM(s) Oral every 6 hours PRN For Temp greater than 38 C (100.4 F)  acetaminophen   Tablet. 650 milliGRAM(s) Oral every 6 hours PRN Mild Pain (1 - 3)  polyethylene glycol 3350 17 Gram(s) Oral daily PRN Constipation INTERVAL EVENTS Patient seen and examined at bedside. No acute events overnight. Vital signs stable. Started on Heparin bridge to coumadin yesterday. AM PTT 53.1, increased heparin to 11cc/hr. Dosed 5mg coumadin tonight. Denies fevers, chills, CP, SOB, abdominal pain, N/V/D, urinary symptoms.     OBJECTIVE:    Vital Signs Last 12 Hrs  T(F): 98.5 (04-28-18 @ 06:00), Max: 98.5 (04-28-18 @ 06:00)  HR: 54 (04-28-18 @ 05:05) (54 - 62)  BP: 123/76 (04-28-18 @ 05:05) (121/68 - 127/69)  BP(mean): 94 (04-28-18 @ 05:05) (87 - 94)  RR: 19 (04-28-18 @ 05:05) (16 - 19)  SpO2: 98% (04-28-18 @ 05:05) (97% - 98%)  I&O's Summary    27 Apr 2018 07:01  -  28 Apr 2018 07:00  --------------------------------------------------------  IN: 250 mL / OUT: 0 mL / NET: 250 mL        PHYSICAL EXAM:  Appearance: NAD. Speaking in full sentences.   HEENT:  PERRL. Conjunctiva clear b/l. Moist oral mucosa.  Cardiovascular: RRR S1, S2 with no murmurs.  Respiratory: Lungs CTAB.   Gastrointestinal:  Soft, nontender. Non-distended. Non-rigid.	  Extremities: No edema b/l. No erythema b/l. LE WWP b/l.  Vascular: DP 2+ b/l.  Neurologic: Alert and awake. Following commands, making eye contact. EOMI. Pupillary light reflex intact. Facial muscle with asymmetric rise with R side droop with eyebrow rise intact (though noted improvement). Soft palate rise symmetric with no tongue deviation. Sensation intact. R decreased compared to left. 4/5 proximal shoulder abduction, 1/5 elbow flex, 1/5 elbow ext, 1/5  strength on R side. 5/5 strength in L upper extremity and b/l lower extremity. Sensation intact.             LABS:    04-27    138  |  99  |  18  ----------------------------<  98  4.6   |  31  |  1.41<H>    Ca    9.9      27 Apr 2018 06:45  Mg     1.8     04-27      PT/INR - ( 27 Apr 2018 18:57 )   PT: 12.5 sec;   INR: 1.12          PTT - ( 27 Apr 2018 18:57 )  PTT:75.5 sec      RADIOLOGY & ADDITIONAL TESTS:    MEDICATIONS  (STANDING):  aspirin enteric coated 81 milliGRAM(s) Oral daily  atorvastatin 80 milliGRAM(s) Oral at bedtime  docusate sodium 100 milliGRAM(s) Oral daily  donepezil 5 milliGRAM(s) Oral at bedtime  escitalopram 5 milliGRAM(s) Oral daily  heparin  Infusion 1000 Unit(s)/Hr (10 mL/Hr) IV Continuous <Continuous>  senna 1 Tablet(s) Oral daily    MEDICATIONS  (PRN):  acetaminophen   Tablet 650 milliGRAM(s) Oral every 6 hours PRN For Temp greater than 38 C (100.4 F)  acetaminophen   Tablet. 650 milliGRAM(s) Oral every 6 hours PRN Mild Pain (1 - 3)  polyethylene glycol 3350 17 Gram(s) Oral daily PRN Constipation

## 2018-04-29 LAB
APTT BLD: 45.1 SEC — HIGH (ref 27.5–37.4)
APTT BLD: 97.9 SEC — HIGH (ref 27.5–37.4)
INR BLD: 1.11 — SIGNIFICANT CHANGE UP (ref 0.88–1.16)
PROTHROM AB SERPL-ACNC: 12.4 SEC — SIGNIFICANT CHANGE UP (ref 9.8–12.7)

## 2018-04-29 PROCEDURE — 99231 SBSQ HOSP IP/OBS SF/LOW 25: CPT

## 2018-04-29 RX ORDER — HEPARIN SODIUM 5000 [USP'U]/ML
950 INJECTION INTRAVENOUS; SUBCUTANEOUS
Qty: 25000 | Refills: 0 | Status: DISCONTINUED | OUTPATIENT
Start: 2018-04-29 | End: 2018-04-29

## 2018-04-29 RX ORDER — WARFARIN SODIUM 2.5 MG/1
7.5 TABLET ORAL AT BEDTIME
Qty: 0 | Refills: 0 | Status: COMPLETED | OUTPATIENT
Start: 2018-04-29 | End: 2018-04-29

## 2018-04-29 RX ORDER — ENOXAPARIN SODIUM 100 MG/ML
90 INJECTION SUBCUTANEOUS
Qty: 0 | Refills: 0 | Status: DISCONTINUED | OUTPATIENT
Start: 2018-04-29 | End: 2018-05-01

## 2018-04-29 RX ADMIN — ENOXAPARIN SODIUM 90 MILLIGRAM(S): 100 INJECTION SUBCUTANEOUS at 17:02

## 2018-04-29 RX ADMIN — ESCITALOPRAM OXALATE 5 MILLIGRAM(S): 10 TABLET, FILM COATED ORAL at 11:39

## 2018-04-29 RX ADMIN — SENNA PLUS 1 TABLET(S): 8.6 TABLET ORAL at 11:39

## 2018-04-29 RX ADMIN — ATORVASTATIN CALCIUM 80 MILLIGRAM(S): 80 TABLET, FILM COATED ORAL at 21:20

## 2018-04-29 RX ADMIN — Medication 100 MILLIGRAM(S): at 11:39

## 2018-04-29 RX ADMIN — DONEPEZIL HYDROCHLORIDE 5 MILLIGRAM(S): 10 TABLET, FILM COATED ORAL at 21:20

## 2018-04-29 RX ADMIN — WARFARIN SODIUM 7.5 MILLIGRAM(S): 2.5 TABLET ORAL at 21:20

## 2018-04-29 RX ADMIN — Medication 81 MILLIGRAM(S): at 11:39

## 2018-04-29 NOTE — PROGRESS NOTE ADULT - PROBLEM SELECTOR PLAN 1
MCA stroke s/p TPA, s/p thrombectomy.  - Initial CT head notable for M2 occlusion  -A1c 5.1, HDL 65/LDL 90/ . Hypercoagulable work up performed and notable for + anticardiolipin and MTHFR gene mutation for which he was evaluated by heme/onc. Noted to have left atrial appendage thrombus/mass for which patient on hep gtt.  - 2nd BRANDON (on 4/18) demonstrated decrease in size from initial study with some suggestion of breakdown within the clot. 3rd BRANDON performed with no gross changes in clot size. CT cardiac structural demonstrated confirmed clot. Patient remains on hep gtt and undergoing bridge to coumadin. Pending Acute rehab- potentially on monday.  - Neuro exam improving. RUE strength- notable for 4/5 strength in elbow flex/ext and shoulder abd. Deficits in /finger movement 1/5. Facial droop on R side improving though present.  - c/w hep gtt at 11cc/hr with PTT goal 60-90. Bridge with 5mg Coumadin  - C/w Lipitor and aspirin 81mg PO.  - SBP goal < 160, SBP as remained stable.  - c/w monitoring q4h neuro checks  - + hypercoaguable/ anticardiolipin+/MTHFR, remains on Hep gtt   - c/w lexapro for motor recovery and atricept for neurosignaling s/p CVA  - c/w PT to work with patient on upper body motor. Bed rest and out of bed to chair, reduce walking around. Incentive spirometry at bedside.  - Possible d/c to rehab Monday MCA stroke s/p TPA, s/p thrombectomy.  - Initial CT head notable for M2 occlusion  -A1c 5.1, HDL 65/LDL 90/ . Hypercoagulable work up performed and notable for + anticardiolipin and MTHFR gene mutation for which he was evaluated by heme/onc. Noted to have left atrial appendage thrombus/mass for which patient on hep gtt.  - 2nd BRANDON (on 4/18) demonstrated decrease in size from initial study with some suggestion of breakdown within the clot. 3rd BRANDON performed with no gross changes in clot size. CT cardiac structural demonstrated confirmed clot. Patient remains on hep gtt and undergoing bridge to coumadin. Pending Acute rehab- potentially on monday.  - Neuro exam improving. RUE strength- notable for 4/5 strength in elbow flex/ext and shoulder abd. Deficits in /finger movement 1/5. Facial droop on R side improving though present.  - c/w hep gtt at 10cc/hr with PTT goal 70-90. Bridging to coumadin- dosed 5mg o/n. PTT this AM   and INR this AM    - C/w Lipitor and aspirin 81mg PO.  - SBP goal < 160, SBP as remained stable.  - c/w monitoring q4h neuro checks  - + hypercoaguable/ anticardiolipin+/MTHFR, remains on Hep gtt   - c/w lexapro for motor recovery and atricept for neurosignaling s/p CVA  - c/w PT to work with patient on upper body motor. Bed rest and out of bed to chair, reduce walking around. Incentive spirometry at bedside.  - Possible d/c to rehab Monday MCA stroke s/p TPA,Initial CT head notable for M2 occlusion s/p thrombectomy.   -A1c 5.1, HDL 65/LDL 90/ . Hypercoagulable work up performed and notable for + anticardiolipin and MTHFR gene mutation for which he was evaluated by heme/onc. Noted to have left atrial appendage thrombus/mass for which patient on hep gtt.  - 2nd BRANDON (on 4/18) demonstrated decrease in size from initial study with some suggestion of breakdown within the clot. 3rd BRANDON performed with no gross changes in clot size. CT cardiac structural demonstrated confirmed clot. Patient remains on hep gtt and undergoing bridge to coumadin. Pending Acute rehab- potentially on monday.  - Neuro exam improving. RUE strength- notable for 4/5 strength in elbow flex/ext and shoulder abd. Deficits in /finger movement 1/5. Facial droop on R side improving though present.  - c/w hep gtt at 9.5cc/hr with PTT goal 70-90. Bridging to coumadin- dosed 5mg o/n. PTT this AM 97 (decreased from 10 to 9.5). Repeat at 2PM. INR this AM 1.1 from 1.17- EMR notable for receiving his 5mg dose tonight. Increase dose tonight to 7.5 mg and follow up in AM.  - C/w Lipitor and aspirin 81mg PO.  - SBP goal < 160, SBP as remained stable.  - c/w monitoring q4h neuro checks  - + hypercoaguable/ anticardiolipin+/MTHFR, remains on Hep gtt   - c/w lexapro for motor recovery and atricept for neurosignaling s/p CVA  - c/w PT to work with patient on upper body motor. Bed rest and out of bed to chair, reduce walking around. Incentive spirometry at bedside.  - Possible d/c to rehab Monday

## 2018-04-29 NOTE — PROGRESS NOTE ADULT - SUBJECTIVE AND OBJECTIVE BOX
PROGRESS NOTE    CC: L MCA CVA s/p thrombectomy.   Overnight Events: Supratherapeutic yesterday evening for which hep gtt decreased, dosed for coumadin.   Interval History: No complaints this AM.   ROS: Denies headaches, vision changes, nausea/vomiting, denies chest pain, shortness of breath, denies palpitations.    OBJECTIVE  Vitals:  T(C): 36.7 (04-29-18 @ 01:00), Max: 37 (04-28-18 @ 17:30)  HR: 52 (04-29-18 @ 04:47) (52 - 76)  BP: 122/74 (04-29-18 @ 04:47) (122/69 - 138/80)  RR: 16 (04-29-18 @ 04:47) (16 - 20)  SpO2: 100% (04-29-18 @ 04:47) (97% - 100%)  Wt(kg): --    I/O:  I&O's Summary    28 Apr 2018 07:01  -  29 Apr 2018 07:00  --------------------------------------------------------  IN: 780 mL / OUT: 820 mL / NET: -40 mL        PHYSICAL EXAM:  Appearance: NAD, no respiratory accessory muscle use. Speaking in full sentences.   HEENT:  PERRL. Conjunctiva clear b/l. Moist oral mucosa.  Cardiovascular: RRR, S1, S2 with no murmurs.  Respiratory: Lungs CTAB.   Gastrointestinal:  Soft, nontender. Non-distended. Non-rigid.	  Extremities: No edema b/l. No erythema b/l. LE WWP b/l.  Vascular: DP present.  Neurologic:  Alert and awake oriented x3. Following commands. Making eye contact. 5/5 strength in LUE (, elbow flex/ex, shoulder abduction), 5/5 strength in b/l LE (hip flex, knee flex/ext, dorsi/plantar flex). 4/5 strength in shoulder abduction and flex, 3/5 strength in elbow flex/ext, 1/5 strength in . Mild facial droop on R lower face(continues to improve from previous exam) compared to left. No tongue deviation. Soft palate rise symmetrical.   	  LABS:    04-28    137  |  100  |  14  ----------------------------<  106<H>  4.0   |  25  |  1.21    Ca    9.8      28 Apr 2018 07:05  Mg     1.7     04-28      PT/INR - ( 28 Apr 2018 10:02 )   PT: 13.0 sec;   INR: 1.17          PTT - ( 28 Apr 2018 22:47 )  PTT:121.2 sec      RADIOLOGY & ADDITIONAL TESTS:  Reviewed .    MEDICATIONS  (STANDING):  aspirin enteric coated 81 milliGRAM(s) Oral daily  atorvastatin 80 milliGRAM(s) Oral at bedtime  docusate sodium 100 milliGRAM(s) Oral daily  donepezil 5 milliGRAM(s) Oral at bedtime  escitalopram 5 milliGRAM(s) Oral daily  heparin  Infusion 1000 Unit(s)/Hr (10 mL/Hr) IV Continuous <Continuous>  senna 1 Tablet(s) Oral daily    MEDICATIONS  (PRN):  acetaminophen   Tablet 650 milliGRAM(s) Oral every 6 hours PRN For Temp greater than 38 C (100.4 F)  acetaminophen   Tablet. 650 milliGRAM(s) Oral every 6 hours PRN Mild Pain (1 - 3)  polyethylene glycol 3350 17 Gram(s) Oral daily PRN Constipation      ASSESSMENT:    PLAN:

## 2018-04-29 NOTE — PROGRESS NOTE ADULT - ASSESSMENT
51yo M with no significant PMH presented from [facility] for acute left M2 occlusion s/p tPA for thrombectomy. During CVA work up patient found to have large L atrial appendage mass/clot, on heparin gtt. Initial repeat BRANDON suggestive of LA clot breakdown/decrease in size and 3rd BRANDON demonstrated no change from last. Patient remains on hep gtt while undergoing bridge to coumadin and pending acute rehabilitation.

## 2018-04-29 NOTE — PROGRESS NOTE ADULT - PROBLEM SELECTOR PLAN 2
As above notable anticardiolipin positive antibody for which Heme/Onc (Jennifer) was previously consulted for evaluation.   - c/w hep gtt at 11cc/hr, PTT goal 60-90, bridged with Coumadin 5mg daily  -BRANDON performed demonstrated no change in size. CT cardiac structural demonstrates confirmed clot. Pending further decision on management. As above notable anticardiolipin positive antibody for which Heme/Onc (Jennifer) was previously consulted for evaluation.   - c/w hep gtt at 10cc/hr, PTT goal 70-90, bridging to Coumadin (5mg daily)  -BRANDON performed demonstrated no change in size. CT cardiac structural demonstrates confirmed clot. Bridge to coumadin and pending acute rehabilitation potentially on Monday. As above notable anticardiolipin positive antibody for which Heme/Onc (Jennifer) was previously consulted for evaluation.   - c/w hep gtt at 9.5 cc/hr (Supratherapeutic at 97 this AM- Repeat this afternoon), PTT goal 70-90, bridging to Coumadin (5mg daily)  -BRANDON performed demonstrated no change in size. CT cardiac structural demonstrates confirmed clot. Bridge to coumadin and pending acute rehabilitation potentially on Monday.

## 2018-04-29 NOTE — PROGRESS NOTE ADULT - PROBLEM SELECTOR PLAN 7
VTE: Hep gtt, SCDs    FULL CODE    Dispo: Admitted to [facility] for management of post CVA and hep gtt for Left atrial thrombus. Given increased risk for embolization, patient remains on hep gtt and q4h neuro monitoring. Pending further decision on intervention/management for LA clot for dispo. VTE: Hep gtt, SCDs    FULL CODE    Dispo: Admitted to [facility] for management of post CVA and hep gtt for Left atrial thrombus. Given increased risk for embolization, patient remains on hep gtt and q4h neuro monitoring. Bridging to coumadin and pending acute rehabilitation- potentially on monday. VTE: Hep gtt, bridging to coumadin, SCDs    FULL CODE    Dispo: Admitted to [facility] for management of post CVA and hep gtt for Left atrial thrombus. Given increased risk for embolization, patient remains on hep gtt and q4h neuro monitoring. Bridging to coumadin and pending acute rehabilitation- potentially on monday.

## 2018-04-29 NOTE — PROGRESS NOTE ADULT - PROBLEM SELECTOR PLAN 5
Cr noted to be elevated- has remained stable. Likely underlying CKD stage 2. Cr at 1.21 today. Resolving.   -c/w monitoring Cr and UOP. Cr noted to be elevated- has remained stable. Likely underlying CKD stage 2. Cr today  Resolving.   -c/w monitoring Cr and UOP. Cr noted to be elevated- has remained stable. Likely underlying CKD stage 2. Cr was 1.2 yesterday. Resolved.  -c/w monitoring Cr and UOP.

## 2018-04-29 NOTE — PROGRESS NOTE ADULT - PROBLEM SELECTOR PLAN 3
BRANDON demonstrated L atrial clot for which patient started on hep gtt as above. 2nd BRANDON demonstrated some improving size of clot/decreasing in size and more notable hypoechogenicity for which suggestive of lysis/breakdown within the clot. 3rd BRANDON (3/25) without change in size noted in clot. CT cardiac structural confirmed clot (not tissue). Patient remains on hep gtt and q4h neuro checks while pending further intervention/management decision in collaboration with neuro/CTS/Cardio.  -hep gtt with continued neuro monitoring q4h as patient remains high embolization risk  - c/w hep gtt at 11cc/hr and has remained therapeutic (Goal 60-90 PTT), bridged with Coumadin 5mg daily  - working with PT on upper body focused, out of bed to chair and limited to bed rest with concern for at risk for embolization of clot. BRANDON demonstrated L atrial clot for which patient started on hep gtt as above. 2nd BRANDON demonstrated some improving size of clot/decreasing in size and more notable hypoechogenicity for which suggestive of lysis/breakdown within the clot. 3rd BRANDON (3/25) without change in size noted in clot. CT cardiac structural confirmed clot (not tissue). Patient remains on hep gtt and q4h neuro checks while pending further intervention/management decision in collaboration with neuro/CTS/Cardio.  -hep gtt with continued neuro monitoring q4h as patient remains high embolization risk  - c/w hep gtt at 10cc/hr and was subtherapeutic yesterday-Given bolus and increased to 12 but decreased o/n for supratherapeutic ptt (Goal narrowed to 70-90 PTT), bridged with Coumadin 5mg daily  - working with PT on upper body focused, out of bed to chair and limited to bed rest with concern for at risk for embolization of clot. BRANDON demonstrated L atrial clot for which patient started on hep gtt as above. 2nd BRANDON demonstrated some improving size of clot/decreasing in size and more notable hypoechogenicity for which suggestive of lysis/breakdown within the clot. 3rd BRANDON (3/25) without change in size noted in clot. CT cardiac structural confirmed clot (not tissue). Patient remains on hep gtt and q4h neuro checks while pending further intervention/management decision in collaboration with neuro/CTS/Cardio.  -hep gtt with continued neuro monitoring q4h as patient remains high embolization risk  - c/w hep gtt at 9.5 cc/hr, was subtherapeutic yesterday-Given bolus and increased to 12 but decreased o/n for supratherapeutic ptt (Goal narrowed to 70-90 PTT), and again this AM. Repeat PTT this afternoon.  - Bridged with Coumadin but INR decreased to 1.1 from 1.17, increase dose tonight to 7.5 mg  - working with PT on upper body focused, out of bed to chair and limited to bed rest with concern for at risk for embolization of clot.

## 2018-04-30 LAB
APTT BLD: 33.1 SEC — SIGNIFICANT CHANGE UP (ref 27.5–37.4)
INR BLD: 1.19 — HIGH (ref 0.88–1.16)
PROTHROM AB SERPL-ACNC: 13.3 SEC — HIGH (ref 9.8–12.7)

## 2018-04-30 PROCEDURE — 99233 SBSQ HOSP IP/OBS HIGH 50: CPT

## 2018-04-30 RX ORDER — DONEPEZIL HYDROCHLORIDE 10 MG/1
1 TABLET, FILM COATED ORAL
Qty: 0 | Refills: 0 | DISCHARGE
Start: 2018-04-30

## 2018-04-30 RX ORDER — ESCITALOPRAM OXALATE 10 MG/1
1 TABLET, FILM COATED ORAL
Qty: 0 | Refills: 0 | DISCHARGE
Start: 2018-04-30

## 2018-04-30 RX ORDER — WARFARIN SODIUM 2.5 MG/1
7.5 TABLET ORAL ONCE
Qty: 0 | Refills: 0 | Status: COMPLETED | OUTPATIENT
Start: 2018-04-30 | End: 2018-04-30

## 2018-04-30 RX ORDER — ASPIRIN/CALCIUM CARB/MAGNESIUM 324 MG
1 TABLET ORAL
Qty: 0 | Refills: 0 | DISCHARGE
Start: 2018-04-30

## 2018-04-30 RX ORDER — ATORVASTATIN CALCIUM 80 MG/1
1 TABLET, FILM COATED ORAL
Qty: 0 | Refills: 0 | DISCHARGE
Start: 2018-04-30

## 2018-04-30 RX ADMIN — Medication 81 MILLIGRAM(S): at 12:05

## 2018-04-30 RX ADMIN — WARFARIN SODIUM 7.5 MILLIGRAM(S): 2.5 TABLET ORAL at 22:21

## 2018-04-30 RX ADMIN — DONEPEZIL HYDROCHLORIDE 5 MILLIGRAM(S): 10 TABLET, FILM COATED ORAL at 22:21

## 2018-04-30 RX ADMIN — ESCITALOPRAM OXALATE 5 MILLIGRAM(S): 10 TABLET, FILM COATED ORAL at 12:05

## 2018-04-30 RX ADMIN — ENOXAPARIN SODIUM 90 MILLIGRAM(S): 100 INJECTION SUBCUTANEOUS at 17:44

## 2018-04-30 RX ADMIN — Medication 100 MILLIGRAM(S): at 12:05

## 2018-04-30 RX ADMIN — ENOXAPARIN SODIUM 90 MILLIGRAM(S): 100 INJECTION SUBCUTANEOUS at 06:43

## 2018-04-30 RX ADMIN — ATORVASTATIN CALCIUM 80 MILLIGRAM(S): 80 TABLET, FILM COATED ORAL at 22:21

## 2018-04-30 RX ADMIN — SENNA PLUS 1 TABLET(S): 8.6 TABLET ORAL at 12:05

## 2018-04-30 NOTE — PROGRESS NOTE ADULT - PROBLEM SELECTOR PROBLEM 3
Atrial mass
MARIELOS (acute kidney injury)
Atrial mass
MARIELOS (acute kidney injury)
MARIELOS (acute kidney injury)
Palpitations
Atrial mass

## 2018-04-30 NOTE — PROGRESS NOTE ADULT - PROBLEM SELECTOR PLAN 2
As above notable anticardiolipin positive antibody for which Heme/Onc (Jennifer) was previously consulted for evaluation.   -BRANDON performed demonstrated no change in size. CT cardiac structural demonstrates confirmed clot. Bridge to coumadin and pending acute rehabilitation.  -Switched from hep gtt to lovenox for bridge to coumadin. Now on AC for 3 weeks with clot stabilization- plan to continue on lovenox with coumadin bridge and no other intervention. Determined stable for pending acute rehabilitation.

## 2018-04-30 NOTE — PROGRESS NOTE ADULT - PROBLEM SELECTOR PLAN 7
VTE: Hep gtt, bridging to coumadin, SCDs    FULL CODE    Dispo: Admitted to [facility] for management of post CVA and LA thrombus on Lovenox to coumadin bridge

## 2018-04-30 NOTE — PROGRESS NOTE ADULT - PROBLEM SELECTOR PROBLEM 4
Palpitations
Nutrition, metabolism, and development symptoms
Palpitations
MARIELOS (acute kidney injury)
Nutrition, metabolism, and development symptoms
Nutrition, metabolism, and development symptoms
Palpitations

## 2018-04-30 NOTE — PROGRESS NOTE ADULT - PROBLEM SELECTOR PROBLEM 2
Anticardiolipin antibody positive
Palpitations
Anticardiolipin antibody positive
Palpitations
Palpitations
Anticardiolipin antibody positive

## 2018-04-30 NOTE — PROGRESS NOTE ADULT - PROBLEM SELECTOR PLAN 1
MCA stroke s/p TPA,Initial CT head notable for M2 occlusion s/p thrombectomy.   -A1c 5.1, HDL 65/LDL 90/ . Hypercoagulable work up performed and notable for + anticardiolipin and MTHFR gene mutation for which he was evaluated by heme/onc. Noted to have left atrial appendage thrombus/mass for which patient on hep gtt.  - 2nd BRANDON (on 4/18) demonstrated decrease in size from initial study with some suggestion of breakdown within the clot. 3rd BRANDON performed with no gross changes in clot size. CT cardiac structural demonstrated confirmed clot. Patient remains on hep gtt and undergoing bridge to coumadin. Pending Acute rehab- potentially on monday.  - Neuro exam improving. RUE strength- notable for 4/5 strength in elbow flex/ext and shoulder abd. Deficits in /finger movement 1/5. Facial droop on R side improving though present.  - Switched from hep gtt to lovenox for bridge to coumadin. Now on AC for 3 weeks with clot stabilization- plan to continue on lovenox with coumadin bridge and no other intervention. Determined stable for pending acute rehabilitation.   - C/w Lipitor and aspirin 81mg PO.  - SBP goal < 160, SBP as remained stable.  - + hypercoaguable/ anticardiolipin+/MTHFR, remains on Hep gtt   - c/w lexapro for motor recovery and atricept for neurosignaling s/p CVA  - Discharge pending PT evaluation for final recs for discharge to acute rehabilitation.

## 2018-04-30 NOTE — PROGRESS NOTE ADULT - PROBLEM SELECTOR PROBLEM 6
Nutrition, metabolism, and development symptoms
Prophylactic measure
Nutrition, metabolism, and development symptoms
Nutrition, metabolism, and development symptoms

## 2018-04-30 NOTE — PROGRESS NOTE ADULT - PROBLEM SELECTOR PLAN 5
Cr noted to be elevated- has remained stable. Likely underlying CKD stage 2.   -c/w monitoring Cr and UOP.

## 2018-04-30 NOTE — PROGRESS NOTE ADULT - ATTENDING COMMENTS
Patient seen and examined with Resident. Agree with above.  Patient frustrated about not being able to walk.  Started on Lovenox yesterday with coumadin and Aspirin - no bleeding in urine or stool.    PT to reassess patient today - for acute rehab  Will obtain cardiology clearance for rehab.  Note: Cardiology has been involved in case during the admissions including his TEEs.  Patient neurologically cleared for rehab.

## 2018-04-30 NOTE — CONSULT NOTE ADULT - ATTENDING COMMENTS
Assessment: Patient personally seen and examined myself during rounds with the Physician Assistant/House Staff/Nurse Practitioner     Physician Assistant/House Staff/Nurse Practitioner note read, including vitals, physical findings, laboratory data, and radiological reports.   Revisions included below.   Direct personal management at bed side and extensive interpretation of the data.    Plan was outlined and discussed in details with the Physician Assistant/House Staff/Nurse Practitioner.    Decision making of high complexity   Risk high of complications, morbidity, and/or mortality  Assessment and Action taken for acute disease activity to reflect the level of care provided:  -Hemodynamic evaluation and support  -Cardiac Telemetry reviewed  -Medication reconciliation  -Review laboratory data  -EKG reviewed   -Echo reviewed     TIME SPENT in evaluation and management, reassessments, review and interpretation of labs and x-rays, and hemodynamic management, formulating a plan and coordinating care: ___50____ MIN.  Time does not include procedural time.
Patient arrived after tpa started at [facility]. s/p left M2 clot underwent emergency thrombectomy with TICI 3 recanalization.    persistent expressive and receptive aphasia and right UE plegia. Increased word output by evening today but obviosly slurred. Right LE is 4/5. Follows commands 1 step.     Imp   1.  Acute left MCA  - will get cardioembolic work up as well as hypercoagulable work up.   120 minutes spent on patient

## 2018-04-30 NOTE — PROGRESS NOTE ADULT - ASSESSMENT
51yo M with no significant PMH presented from [facility] for acute left M2 occlusion s/p tPA for thrombectomy. During CVA work up patient found to have large L atrial appendage mass/clot, on heparin gtt. Initial repeat BRANDON suggestive of LA clot breakdown/decrease in size and 3rd BRANDON demonstrated no change from last. Patient switched from hep gtt to lovenox for bridge to coumadin and warrants no further intervention. Patient has been on anticoagulation for 3 weeks and determined stable for pending discharge to acute rehabilitation.

## 2018-04-30 NOTE — CONSULT NOTE ADULT - ASSESSMENT
53 yo M with acute left MCA CVA s/p thrombectomy noted to have MELANI clot on BRANDON and positive anticardiolipin antibodies.     # Left Atrial Clot  - stable on anticoagulation  - BRANDON #2 and #3 with decreased clot size from initial study  - hypercoagulable + anticardiolipin ab  - no atrial fibrillation noted on telemetry  - will likely need indefinite anticoagulation given thrombus and positive hypercoagulable state.  Very strong family hx of strokes.  Defer to hematology regarding choice of therapy and duration.    - no signs of coronary ischemia or heart failure  - EKG (4/9/2018): sinus dang, 1st deg AV block  - ECHO normal LVEF, no significant valvular disease   - no further cardiac workup needed at this point.  Pt may benefit from implantable loop recorder as outpatient to evaluate for paroxysmal afib given hx of palpitations and MELANI clot  - case d/w Dr. Roy

## 2018-04-30 NOTE — PROGRESS NOTE ADULT - PROBLEM SELECTOR PROBLEM 5
Creatinine elevation
Prophylactic measure
Creatinine elevation
MARIELOS (acute kidney injury)
Nutrition, metabolism, and development symptoms
Prophylactic measure
Prophylactic measure
Creatinine elevation
Creatinine elevation

## 2018-04-30 NOTE — PROGRESS NOTE ADULT - PROBLEM SELECTOR PLAN 4
Hx of palpitations, EKG notable for NSR with 1st degree AV block- evaluated as outpatient by cardiology. LA thrombus during course. Remains asymptomatic.

## 2018-04-30 NOTE — PROGRESS NOTE ADULT - SUBJECTIVE AND OBJECTIVE BOX
PROGRESS NOTE    CC:  Overnight Events:  Interval History:   ROS:    OBJECTIVE  Vitals:  T(C): 36.9 (04-30-18 @ 09:29), Max: 36.9 (04-30-18 @ 01:00)  HR: 64 (04-30-18 @ 08:30) (54 - 64)  BP: 151/79 (04-30-18 @ 08:30) (117/73 - 151/79)  RR: 16 (04-30-18 @ 08:30) (15 - 18)  SpO2: 100% (04-30-18 @ 08:30) (96% - 100%)  Wt(kg): --    I/O:  I&O's Summary    29 Apr 2018 07:01  -  30 Apr 2018 07:00  --------------------------------------------------------  IN: 0 mL / OUT: 400 mL / NET: -400 mL        PHYSICAL EXAM:  Appearance: NAD. Speaking in full sentences.   HEENT:   Conjunctiva clear b/l. Moist oral mucosa.  Cardiovascular: RRR with no murmurs.  Respiratory: Lungs CTAB.   Gastrointestinal:  Soft, nontender. Non-distended. Non-rigid.	  Extremities: No edema b/l. No erythema b/l. LE WWP b/l.  Vascular: DP 2+ b/l.  Neurologic:  Alert and awake. Moving all extremities. Following commands. Making eye contact.  	  LABS:          PT/INR - ( 30 Apr 2018 07:05 )   PT: 13.3 sec;   INR: 1.19          PTT - ( 30 Apr 2018 07:05 )  PTT:33.1 sec      RADIOLOGY & ADDITIONAL TESTS:  Reviewed .    MEDICATIONS  (STANDING):  aspirin enteric coated 81 milliGRAM(s) Oral daily  atorvastatin 80 milliGRAM(s) Oral at bedtime  docusate sodium 100 milliGRAM(s) Oral daily  donepezil 5 milliGRAM(s) Oral at bedtime  enoxaparin Injectable 90 milliGRAM(s) SubCutaneous two times a day  escitalopram 5 milliGRAM(s) Oral daily  senna 1 Tablet(s) Oral daily    MEDICATIONS  (PRN):  acetaminophen   Tablet 650 milliGRAM(s) Oral every 6 hours PRN For Temp greater than 38 C (100.4 F)  acetaminophen   Tablet. 650 milliGRAM(s) Oral every 6 hours PRN Mild Pain (1 - 3)  polyethylene glycol 3350 17 Gram(s) Oral daily PRN Constipation PROGRESS NOTE    CC: L MCA CVA, s/p thrombectomy  Overnight Events: LIDA.   Interval History: No complaints.   ROS: No headaches, no dizziness, cough, congestion, shortness of breath, chest pain, palpitations, abdominal pain, nausea, vomiting.    OBJECTIVE  Vitals:  T(C): 36.9 (04-30-18 @ 09:29), Max: 36.9 (04-30-18 @ 01:00)  HR: 64 (04-30-18 @ 08:30) (54 - 64)  BP: 151/79 (04-30-18 @ 08:30) (117/73 - 151/79)  RR: 16 (04-30-18 @ 08:30) (15 - 18)  SpO2: 100% (04-30-18 @ 08:30) (96% - 100%)  Wt(kg): --    I/O:  I&O's Summary    29 Apr 2018 07:01  -  30 Apr 2018 07:00  --------------------------------------------------------  IN: 0 mL / OUT: 400 mL / NET: -400 mL        PHYSICAL EXAM:  Appearance: NAD. Speaking in full sentences.   HEENT:  PERRL. Conjunctiva clear b/l. Moist oral mucosa.  Cardiovascular: RRR, S1, S2 with no murmurs.  Respiratory: Lungs CTAB.   Gastrointestinal:  Soft, nontender. Non-distended. Non-rigid.	  Extremities: No edema b/l. No erythema b/l. LE WWP b/l.  Vascular: DP 2+ b/l.  Neurologic:  Alert and awake oriented x3. Following commands. Making eye contact. 5/5 strength in LUE (, elbow flex/ex, shoulder abduction), 5/5 strength in b/l LE (hip flex, knee flex/ext, dorsi/plantar flex). 4/5 strength in shoulder abduction and flex, 3/5 strength in elbow flex/ext, 1/5 strength in . Mild facial droop on R lower face(continues to improve from previous exam) compared to left. No tongue deviation. Soft palate rise symmetrical.   	  LABS:          PT/INR - ( 30 Apr 2018 07:05 )   PT: 13.3 sec;   INR: 1.19          PTT - ( 30 Apr 2018 07:05 )  PTT:33.1 sec      RADIOLOGY & ADDITIONAL TESTS:  Reviewed .    MEDICATIONS  (STANDING):  aspirin enteric coated 81 milliGRAM(s) Oral daily  atorvastatin 80 milliGRAM(s) Oral at bedtime  docusate sodium 100 milliGRAM(s) Oral daily  donepezil 5 milliGRAM(s) Oral at bedtime  enoxaparin Injectable 90 milliGRAM(s) SubCutaneous two times a day  escitalopram 5 milliGRAM(s) Oral daily  senna 1 Tablet(s) Oral daily    MEDICATIONS  (PRN):  acetaminophen   Tablet 650 milliGRAM(s) Oral every 6 hours PRN For Temp greater than 38 C (100.4 F)  acetaminophen   Tablet. 650 milliGRAM(s) Oral every 6 hours PRN Mild Pain (1 - 3)  polyethylene glycol 3350 17 Gram(s) Oral daily PRN Constipation

## 2018-04-30 NOTE — PROGRESS NOTE ADULT - PROBLEM SELECTOR PROBLEM 7
Prophylactic measure

## 2018-04-30 NOTE — CONSULT NOTE ADULT - CONSULT REASON
PM&R evaluation: s/p acute stroke
Stroke in patient without known risk factors. According to wife, pt's father had a stroke at about the same age, and then eventually  of a stroke at age 80
global aphasia and weakness
Clearance

## 2018-04-30 NOTE — CONSULT NOTE ADULT - SUBJECTIVE AND OBJECTIVE BOX
Patient is a 52y old  Male who presents with a chief complaint of CVA (07 Apr 2018 16:49)        HPI:  52 year old ambidextrous (right  hand preference) who has been under the care of cardiologist in [facility] (Dr. Ding) for "heart palpitations, not on any medications.  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  Upon arrival patient noted to be aphasic, right facial droop, right hemiplegia, and left gaze preference.  A small hematoma note above his right eye.  CTA left M2 occlusion taken to angio suite for mechanical thrombectomy. (05 Apr 2018 10:00)    Consulted for clearance to discharge to rehab.  Pt with acute Left MCA s/p thrombectomy with improving right sided deficits found to have left atrial thrombus on BRANDON.  Pt has been on IV heparin for three weeks now lovenox/coumadin bridge. Consulted as rehab facility requested cardiac clearance for discharge.  Pt currently feeling well.  no chest pain or sob.  no hx of cad.  hx of normal stress tests.  follows with outpatient cardiologist for palpitations but has never been diagnosed with atrial fibrillation as outpatient or during current hospital stay.  Strong fhx of stroke (brother and father both in 50's).  Pt noted to have positive hypercoagulable workup with antiphospholipid antibodies.       PAST MEDICAL & SURGICAL HISTORY:  Cerebrovascular accident (CVA) due to occlusion of left middle cerebral artery: Left M2 occlusion  Irregular heart beat    FAMILY HISTORY:  Family history of cerebrovascular accident (CVA) in father (Father): Diagnosed at age 50s    Social:  Allergies    No Known Allergies    Intolerances    	  MEDICATIONS:        acetaminophen   Tablet 650 milliGRAM(s) Oral every 6 hours PRN  acetaminophen   Tablet. 650 milliGRAM(s) Oral every 6 hours PRN  donepezil 5 milliGRAM(s) Oral at bedtime  escitalopram 5 milliGRAM(s) Oral daily    docusate sodium 100 milliGRAM(s) Oral daily  polyethylene glycol 3350 17 Gram(s) Oral daily PRN  senna 1 Tablet(s) Oral daily    atorvastatin 80 milliGRAM(s) Oral at bedtime    aspirin enteric coated 81 milliGRAM(s) Oral daily  enoxaparin Injectable 90 milliGRAM(s) SubCutaneous two times a day      PHYSICAL EXAM:  T(C): 36.3 (04-30-18 @ 13:49), Max: 36.9 (04-30-18 @ 01:00)  HR: 58 (04-30-18 @ 15:30) (54 - 68)  BP: 148/83 (04-30-18 @ 15:30) (117/73 - 151/79)  RR: 18 (04-30-18 @ 12:30) (15 - 18)  SpO2: 98% (04-30-18 @ 15:30) (92% - 100%)  Wt(kg): --  I&O's Summary    29 Apr 2018 07:01  -  30 Apr 2018 07:00  --------------------------------------------------------  IN: 0 mL / OUT: 400 mL / NET: -400 mL          Gen: NAD, AAOx3, right facial droop,  Neck: no JVD  Cardiovascular: Normal S1 S2, No murmurs,    Respiratory: Lungs clear to auscultation	  Gastrointestinal:  Soft, Non-tender, + BS	   Ext: no edema  Neuro: right upper ext weakness, cant move fingers/hand  Vascular: Peripheral pulses palpable 2+ bilaterally    TELEMETRY: 	    ECG:  	  RADIOLOGY:   DIAGNOSTIC TESTING:  Echocardiogram:  Catheterization:  Stress Test:        LABS:	 	    CARDIAC MARKERS:                    proBNP:   Lipid Profile:   HgA1c:   TSH:     ASSESSMENT/PLAN:

## 2018-04-30 NOTE — PROGRESS NOTE ADULT - PROBLEM SELECTOR PLAN 3
BRANDON demonstrated L atrial clot for which patient started on hep gtt as above. 2nd BRANDON demonstrated some improving size of clot/decreasing in size and more notable hypoechogenicity for which suggestive of lysis/breakdown within the clot. 3rd BRANDON (3/25) without change in size noted in clot. CT cardiac structural confirmed clot (not tissue). Patient remains on hep gtt and q4h neuro checks while pending further intervention/management decision in collaboration with neuro/CTS/Cardio.  -Switched from hep gtt to lovenox for bridge to coumadin. Now on AC for 3 weeks with clot stabilization- plan to continue on lovenox with coumadin bridge and no other intervention. Determined stable for pending acute rehabilitation.   -PT to see patient to make final recommendations for acute rehabilitation.

## 2018-04-30 NOTE — PROGRESS NOTE ADULT - NSHPATTENDINGPLANDISCUSS_GEN_ALL_CORE
RN, house staff.
RN, house staff.
Dr. Mcfadden
pt , daughter and Dr. Mcfadden
pt , wife and Dr. Mcfadden
pt, wife HO
pt, wife and Dr. Mcfadden
HO
[facility] team

## 2018-05-01 ENCOUNTER — INPATIENT (INPATIENT)
Facility: HOSPITAL | Age: 53
LOS: 9 days | Discharge: HOME | DRG: 949 | End: 2018-05-11
Attending: PSYCHIATRY & NEUROLOGY | Admitting: PSYCHIATRY & NEUROLOGY
Payer: COMMERCIAL

## 2018-05-01 VITALS — TEMPERATURE: 98 F

## 2018-05-01 DIAGNOSIS — I63.9 CEREBRAL INFARCTION, UNSPECIFIED: ICD-10-CM

## 2018-05-01 DIAGNOSIS — I69.351 HEMIPLEGIA AND HEMIPARESIS FOLLOWING CEREBRAL INFARCTION AFFECTING RIGHT DOMINANT SIDE: ICD-10-CM

## 2018-05-01 DIAGNOSIS — Z48.812 ENCOUNTER FOR SURGICAL AFTERCARE FOLLOWING SURGERY ON THE CIRCULATORY SYSTEM: ICD-10-CM

## 2018-05-01 PROBLEM — I63.512 CEREBRAL INFARCTION DUE TO UNSPECIFIED OCCLUSION OR STENOSIS OF LEFT MIDDLE CEREBRAL ARTERY: Chronic | Status: ACTIVE | Noted: 2018-04-05

## 2018-05-01 LAB
APTT BLD: 36.4 SEC — SIGNIFICANT CHANGE UP (ref 27.5–37.4)
INR BLD: 1.33 — HIGH (ref 0.88–1.16)
PROTHROM AB SERPL-ACNC: 14.8 SEC — HIGH (ref 9.8–12.7)

## 2018-05-01 PROCEDURE — 85300 ANTITHROMBIN III ACTIVITY: CPT

## 2018-05-01 PROCEDURE — 93306 TTE W/DOPPLER COMPLETE: CPT

## 2018-05-01 PROCEDURE — 70450 CT HEAD/BRAIN W/O DYE: CPT

## 2018-05-01 PROCEDURE — 82962 GLUCOSE BLOOD TEST: CPT

## 2018-05-01 PROCEDURE — 92523 SPEECH SOUND LANG COMPREHEN: CPT | Mod: GN

## 2018-05-01 PROCEDURE — 70498 CT ANGIOGRAPHY NECK: CPT

## 2018-05-01 PROCEDURE — 99223 1ST HOSP IP/OBS HIGH 75: CPT

## 2018-05-01 PROCEDURE — C1887: CPT

## 2018-05-01 PROCEDURE — 97110 THERAPEUTIC EXERCISES: CPT

## 2018-05-01 PROCEDURE — 85303 CLOT INHIBIT PROT C ACTIVITY: CPT

## 2018-05-01 PROCEDURE — 85307 ASSAY ACTIVATED PROTEIN C: CPT

## 2018-05-01 PROCEDURE — 84443 ASSAY THYROID STIM HORMONE: CPT

## 2018-05-01 PROCEDURE — 36415 COLL VENOUS BLD VENIPUNCTURE: CPT

## 2018-05-01 PROCEDURE — 97161 PT EVAL LOW COMPLEX 20 MIN: CPT

## 2018-05-01 PROCEDURE — 83735 ASSAY OF MAGNESIUM: CPT

## 2018-05-01 PROCEDURE — 85610 PROTHROMBIN TIME: CPT

## 2018-05-01 PROCEDURE — 85730 THROMBOPLASTIN TIME PARTIAL: CPT

## 2018-05-01 PROCEDURE — 86147 CARDIOLIPIN ANTIBODY EA IG: CPT

## 2018-05-01 PROCEDURE — 93312 ECHO TRANSESOPHAGEAL: CPT

## 2018-05-01 PROCEDURE — 83036 HEMOGLOBIN GLYCOSYLATED A1C: CPT

## 2018-05-01 PROCEDURE — 97116 GAIT TRAINING THERAPY: CPT

## 2018-05-01 PROCEDURE — 97535 SELF CARE MNGMENT TRAINING: CPT

## 2018-05-01 PROCEDURE — 80048 BASIC METABOLIC PNL TOTAL CA: CPT

## 2018-05-01 PROCEDURE — 99238 HOSP IP/OBS DSCHRG MGMT 30/<: CPT

## 2018-05-01 PROCEDURE — 85301 ANTITHROMBIN III ANTIGEN: CPT

## 2018-05-01 PROCEDURE — 92507 TX SP LANG VOICE COMM INDIV: CPT | Mod: GN

## 2018-05-01 PROCEDURE — 92610 EVALUATE SWALLOWING FUNCTION: CPT | Mod: GN

## 2018-05-01 PROCEDURE — 85613 RUSSELL VIPER VENOM DILUTED: CPT

## 2018-05-01 PROCEDURE — C1889: CPT

## 2018-05-01 PROCEDURE — 86146 BETA-2 GLYCOPROTEIN ANTIBODY: CPT

## 2018-05-01 PROCEDURE — 86140 C-REACTIVE PROTEIN: CPT

## 2018-05-01 PROCEDURE — C8925: CPT

## 2018-05-01 PROCEDURE — 85652 RBC SED RATE AUTOMATED: CPT

## 2018-05-01 PROCEDURE — 93970 EXTREMITY STUDY: CPT

## 2018-05-01 PROCEDURE — 83090 ASSAY OF HOMOCYSTEINE: CPT

## 2018-05-01 PROCEDURE — 75573 CT HRT C+ STRUX CGEN HRT DS: CPT

## 2018-05-01 PROCEDURE — 85027 COMPLETE CBC AUTOMATED: CPT

## 2018-05-01 PROCEDURE — 84100 ASSAY OF PHOSPHORUS: CPT

## 2018-05-01 PROCEDURE — 70496 CT ANGIOGRAPHY HEAD: CPT

## 2018-05-01 PROCEDURE — 97162 PT EVAL MOD COMPLEX 30 MIN: CPT

## 2018-05-01 PROCEDURE — 80061 LIPID PANEL: CPT

## 2018-05-01 PROCEDURE — 85306 CLOT INHIBIT PROT S FREE: CPT

## 2018-05-01 PROCEDURE — 85598 HEXAGNAL PHOSPH PLTLT NEUTRL: CPT

## 2018-05-01 PROCEDURE — C1760: CPT

## 2018-05-01 PROCEDURE — 80307 DRUG TEST PRSMV CHEM ANLYZR: CPT

## 2018-05-01 PROCEDURE — C1769: CPT

## 2018-05-01 PROCEDURE — C1757: CPT

## 2018-05-01 PROCEDURE — 99222 1ST HOSP IP/OBS MODERATE 55: CPT

## 2018-05-01 PROCEDURE — 97164 PT RE-EVAL EST PLAN CARE: CPT

## 2018-05-01 PROCEDURE — C1894: CPT

## 2018-05-01 RX ORDER — WARFARIN SODIUM 2.5 MG/1
1 TABLET ORAL
Qty: 0 | Refills: 0 | COMMUNITY

## 2018-05-01 RX ORDER — ENOXAPARIN SODIUM 100 MG/ML
90 INJECTION SUBCUTANEOUS
Qty: 0 | Refills: 0 | DISCHARGE
Start: 2018-05-01

## 2018-05-01 RX ADMIN — Medication 81 MILLIGRAM(S): at 11:07

## 2018-05-01 RX ADMIN — ESCITALOPRAM OXALATE 5 MILLIGRAM(S): 10 TABLET, FILM COATED ORAL at 11:07

## 2018-05-01 RX ADMIN — ENOXAPARIN SODIUM 90 MILLIGRAM(S): 100 INJECTION SUBCUTANEOUS at 06:12

## 2018-05-01 RX ADMIN — Medication 100 MILLIGRAM(S): at 11:07

## 2018-05-01 RX ADMIN — SENNA PLUS 1 TABLET(S): 8.6 TABLET ORAL at 11:07

## 2018-05-02 PROCEDURE — 99232 SBSQ HOSP IP/OBS MODERATE 35: CPT

## 2018-05-02 PROCEDURE — 93010 ELECTROCARDIOGRAM REPORT: CPT

## 2018-05-02 PROCEDURE — 99255 IP/OBS CONSLTJ NEW/EST HI 80: CPT

## 2018-05-03 PROCEDURE — 99232 SBSQ HOSP IP/OBS MODERATE 35: CPT

## 2018-05-04 PROCEDURE — 99232 SBSQ HOSP IP/OBS MODERATE 35: CPT

## 2018-05-05 PROCEDURE — 99232 SBSQ HOSP IP/OBS MODERATE 35: CPT

## 2018-05-06 PROCEDURE — 99232 SBSQ HOSP IP/OBS MODERATE 35: CPT

## 2018-05-07 PROCEDURE — 99233 SBSQ HOSP IP/OBS HIGH 50: CPT

## 2018-05-08 PROCEDURE — 99232 SBSQ HOSP IP/OBS MODERATE 35: CPT

## 2018-05-09 PROCEDURE — 99233 SBSQ HOSP IP/OBS HIGH 50: CPT

## 2018-05-10 PROCEDURE — 99233 SBSQ HOSP IP/OBS HIGH 50: CPT

## 2018-05-11 PROCEDURE — 97535 SELF CARE MNGMENT TRAINING: CPT

## 2018-05-11 PROCEDURE — 76775 US EXAM ABDO BACK WALL LIM: CPT | Mod: 26

## 2018-05-11 PROCEDURE — 80069 RENAL FUNCTION PANEL: CPT

## 2018-05-11 PROCEDURE — 81003 URINALYSIS AUTO W/O SCOPE: CPT

## 2018-05-11 PROCEDURE — 83735 ASSAY OF MAGNESIUM: CPT

## 2018-05-11 PROCEDURE — 80076 HEPATIC FUNCTION PANEL: CPT

## 2018-05-11 PROCEDURE — 97530 THERAPEUTIC ACTIVITIES: CPT

## 2018-05-11 PROCEDURE — 92507 TX SP LANG VOICE COMM INDIV: CPT

## 2018-05-11 PROCEDURE — 92610 EVALUATE SWALLOWING FUNCTION: CPT

## 2018-05-11 PROCEDURE — 97163 PT EVAL HIGH COMPLEX 45 MIN: CPT

## 2018-05-11 PROCEDURE — 99239 HOSP IP/OBS DSCHRG MGMT >30: CPT

## 2018-05-11 PROCEDURE — 76775 US EXAM ABDO BACK WALL LIM: CPT

## 2018-05-11 PROCEDURE — 84300 ASSAY OF URINE SODIUM: CPT

## 2018-05-11 PROCEDURE — 82570 ASSAY OF URINE CREATININE: CPT

## 2018-05-11 PROCEDURE — 80048 BASIC METABOLIC PNL TOTAL CA: CPT

## 2018-05-11 PROCEDURE — 86160 COMPLEMENT ANTIGEN: CPT

## 2018-05-11 PROCEDURE — 82248 BILIRUBIN DIRECT: CPT

## 2018-05-11 PROCEDURE — 86038 ANTINUCLEAR ANTIBODIES: CPT

## 2018-05-11 PROCEDURE — 97167 OT EVAL HIGH COMPLEX 60 MIN: CPT

## 2018-05-11 PROCEDURE — 97112 NEUROMUSCULAR REEDUCATION: CPT

## 2018-05-11 PROCEDURE — 85027 COMPLETE CBC AUTOMATED: CPT

## 2018-05-11 PROCEDURE — 84100 ASSAY OF PHOSPHORUS: CPT

## 2018-05-11 PROCEDURE — 93005 ELECTROCARDIOGRAM TRACING: CPT

## 2018-05-11 PROCEDURE — 85610 PROTHROMBIN TIME: CPT

## 2018-05-11 PROCEDURE — 84550 ASSAY OF BLOOD/URIC ACID: CPT

## 2018-05-11 PROCEDURE — 97116 GAIT TRAINING THERAPY: CPT

## 2018-05-11 PROCEDURE — 92523 SPEECH SOUND LANG COMPREHEN: CPT

## 2018-05-11 PROCEDURE — 80053 COMPREHEN METABOLIC PANEL: CPT

## 2018-05-11 PROCEDURE — 85025 COMPLETE CBC W/AUTO DIFF WBC: CPT

## 2018-05-15 ENCOUNTER — APPOINTMENT (OUTPATIENT)
Dept: INTERNAL MEDICINE | Facility: CLINIC | Age: 53
End: 2018-05-15
Payer: COMMERCIAL

## 2018-05-15 VITALS
WEIGHT: 193 LBS | RESPIRATION RATE: 12 BRPM | TEMPERATURE: 98.3 F | SYSTOLIC BLOOD PRESSURE: 135 MMHG | HEART RATE: 60 BPM | HEIGHT: 71 IN | BODY MASS INDEX: 27.02 KG/M2 | OXYGEN SATURATION: 97 % | DIASTOLIC BLOOD PRESSURE: 80 MMHG

## 2018-05-15 PROCEDURE — 85610 PROTHROMBIN TIME: CPT | Mod: QW

## 2018-05-15 PROCEDURE — 99215 OFFICE O/P EST HI 40 MIN: CPT

## 2018-05-15 NOTE — COUNSELING
[Activity counseling provided] : activity [None] : None [Good understanding] : Patient has a good understanding of lifestyle changes and the steps needed to achieve self management goals

## 2018-05-15 NOTE — PHYSICAL EXAM
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
## 2018-05-16 DIAGNOSIS — N18.2 CHRONIC KIDNEY DISEASE, STAGE 2 (MILD): ICD-10-CM

## 2018-05-16 DIAGNOSIS — G81.91 HEMIPLEGIA, UNSPECIFIED AFFECTING RIGHT DOMINANT SIDE: ICD-10-CM

## 2018-05-16 DIAGNOSIS — R29.810 FACIAL WEAKNESS: ICD-10-CM

## 2018-05-16 DIAGNOSIS — I42.2 OTHER HYPERTROPHIC CARDIOMYOPATHY: ICD-10-CM

## 2018-05-16 DIAGNOSIS — I63.312 CEREBRAL INFARCTION DUE TO THROMBOSIS OF LEFT MIDDLE CEREBRAL ARTERY: ICD-10-CM

## 2018-05-16 DIAGNOSIS — R29.720 NIHSS SCORE 20: ICD-10-CM
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
## 2018-05-16 DIAGNOSIS — S05.8X1A OTHER INJURIES OF RIGHT EYE AND ORBIT, INITIAL ENCOUNTER: ICD-10-CM

## 2018-05-16 DIAGNOSIS — Z92.82 STATUS POST ADMINISTRATION OF TPA (RTPA) IN A DIFFERENT FACILITY WITHIN THE LAST 24 HOURS PRIOR TO ADMISSION TO CURRENT FACILITY: ICD-10-CM

## 2018-05-16 DIAGNOSIS — E72.12 METHYLENETETRAHYDROFOLATE REDUCTASE DEFICIENCY: ICD-10-CM

## 2018-05-16 DIAGNOSIS — G93.6 CEREBRAL EDEMA: ICD-10-CM

## 2018-05-16 DIAGNOSIS — R47.01 APHASIA: ICD-10-CM

## 2018-05-16 DIAGNOSIS — R47.1 DYSARTHRIA AND ANARTHRIA: ICD-10-CM

## 2018-05-16 DIAGNOSIS — I51.3 INTRACARDIAC THROMBOSIS, NOT ELSEWHERE CLASSIFIED: ICD-10-CM

## 2018-05-16 DIAGNOSIS — E87.1 HYPO-OSMOLALITY AND HYPONATREMIA: ICD-10-CM

## 2018-05-16 DIAGNOSIS — Z82.3 FAMILY HISTORY OF STROKE: ICD-10-CM

## 2018-05-17 LAB
ALBUMIN SERPL ELPH-MCNC: 4.1 G/DL
ALP BLD-CCNC: 49 U/L
ALT SERPL-CCNC: 46 U/L
ANION GAP SERPL CALC-SCNC: 14 MMOL/L
AST SERPL-CCNC: 30 U/L
BILIRUB SERPL-MCNC: 0.4 MG/DL
BUN SERPL-MCNC: 21 MG/DL
CALCIUM SERPL-MCNC: 9.8 MG/DL
CHLORIDE SERPL-SCNC: 101 MMOL/L
CHOLEST SERPL-MCNC: 118 MG/DL
CHOLEST/HDLC SERPL: 2 RATIO
CO2 SERPL-SCNC: 24 MMOL/L
CREAT SERPL-MCNC: 1.48 MG/DL
GLUCOSE SERPL-MCNC: 73 MG/DL
HBA1C MFR BLD HPLC: 5.7 %
HDLC SERPL-MCNC: 60 MG/DL
INR PPP: 2.1 RATIO
LDLC SERPL CALC-MCNC: 45 MG/DL
POCT-PROTHROMBIN TIME: 25.5 SECS
POTASSIUM SERPL-SCNC: 4.2 MMOL/L
PROT SERPL-MCNC: 7.9 G/DL
QUALITY CONTROL: YES
SODIUM SERPL-SCNC: 139 MMOL/L
TRIGL SERPL-MCNC: 66 MG/DL
TSH SERPL-ACNC: 0.34 UIU/ML

## 2018-05-18 ENCOUNTER — RESULT CHARGE (OUTPATIENT)
Age: 53
End: 2018-05-18

## 2018-05-18 LAB
INR PPP: 2.1 RATIO
POCT-PROTHROMBIN TIME: 25.1 SECS

## 2018-05-21 LAB
BASOPHILS # BLD AUTO: 0.02 K/UL
BASOPHILS NFR BLD AUTO: 0.4 %
EOSINOPHIL # BLD AUTO: 0.22 K/UL
EOSINOPHIL NFR BLD AUTO: 4.3 %
HCT VFR BLD CALC: 45.7 %
HGB BLD-MCNC: 14.8 G/DL
IMM GRANULOCYTES NFR BLD AUTO: 0.2 %
LYMPHOCYTES # BLD AUTO: 1.85 K/UL
LYMPHOCYTES NFR BLD AUTO: 35.9 %
MAN DIFF?: NORMAL
MCHC RBC-ENTMCNC: 28.7 PG
MCHC RBC-ENTMCNC: 32.4 GM/DL
MCV RBC AUTO: 88.6 FL
MONOCYTES # BLD AUTO: 0.39 K/UL
MONOCYTES NFR BLD AUTO: 7.6 %
NEUTROPHILS # BLD AUTO: 2.66 K/UL
NEUTROPHILS NFR BLD AUTO: 51.6 %
PLATELET # BLD AUTO: 301 K/UL
RBC # BLD: 5.16 M/UL
RBC # FLD: 14.1 %
WBC # FLD AUTO: 5.15 K/UL

## 2018-05-23 ENCOUNTER — RESULT CHARGE (OUTPATIENT)
Age: 53
End: 2018-05-23

## 2018-05-23 DIAGNOSIS — I69.322 DYSARTHRIA FOLLOWING CEREBRAL INFARCTION: ICD-10-CM

## 2018-05-23 DIAGNOSIS — Z51.89 ENCOUNTER FOR OTHER SPECIFIED AFTERCARE: ICD-10-CM

## 2018-05-23 DIAGNOSIS — D68.69 OTHER THROMBOPHILIA: ICD-10-CM

## 2018-05-23 DIAGNOSIS — I69.321 DYSPHASIA FOLLOWING CEREBRAL INFARCTION: ICD-10-CM

## 2018-05-23 DIAGNOSIS — Z91.81 HISTORY OF FALLING: ICD-10-CM

## 2018-05-23 DIAGNOSIS — R79.89 OTHER SPECIFIED ABNORMAL FINDINGS OF BLOOD CHEMISTRY: ICD-10-CM

## 2018-05-23 DIAGNOSIS — I69.392 FACIAL WEAKNESS FOLLOWING CEREBRAL INFARCTION: ICD-10-CM

## 2018-05-23 DIAGNOSIS — Z79.01 LONG TERM (CURRENT) USE OF ANTICOAGULANTS: ICD-10-CM

## 2018-05-23 DIAGNOSIS — I51.3 INTRACARDIAC THROMBOSIS, NOT ELSEWHERE CLASSIFIED: ICD-10-CM

## 2018-05-23 DIAGNOSIS — Z48.812 ENCOUNTER FOR SURGICAL AFTERCARE FOLLOWING SURGERY ON THE CIRCULATORY SYSTEM: ICD-10-CM

## 2018-05-23 DIAGNOSIS — I69.320 APHASIA FOLLOWING CEREBRAL INFARCTION: ICD-10-CM

## 2018-05-23 DIAGNOSIS — I69.351 HEMIPLEGIA AND HEMIPARESIS FOLLOWING CEREBRAL INFARCTION AFFECTING RIGHT DOMINANT SIDE: ICD-10-CM

## 2018-05-23 DIAGNOSIS — R26.9 UNSPECIFIED ABNORMALITIES OF GAIT AND MOBILITY: ICD-10-CM

## 2018-05-23 LAB
INR PPP: 1.5 RATIO
POCT-PROTHROMBIN TIME: 17.9 SECS

## 2018-05-29 ENCOUNTER — RESULT CHARGE (OUTPATIENT)
Age: 53
End: 2018-05-29

## 2018-05-30 LAB
INR PPP: 2.1 RATIO
POCT-PROTHROMBIN TIME: 25.1 SECS

## 2018-06-05 ENCOUNTER — RESULT CHARGE (OUTPATIENT)
Age: 53
End: 2018-06-05

## 2018-06-06 LAB
INR PPP: 1.6 RATIO
POCT-PROTHROMBIN TIME: 19.1 SECS

## 2018-06-08 ENCOUNTER — MEDICATION RENEWAL (OUTPATIENT)
Age: 53
End: 2018-06-08

## 2018-06-13 ENCOUNTER — APPOINTMENT (OUTPATIENT)
Dept: PHYSICAL MEDICINE AND REHAB | Facility: CLINIC | Age: 53
End: 2018-06-13

## 2018-06-14 ENCOUNTER — RESULT CHARGE (OUTPATIENT)
Age: 53
End: 2018-06-14

## 2018-06-15 LAB
INR PPP: 1.9 RATIO
POCT-PROTHROMBIN TIME: 22.8 SECS

## 2018-06-19 ENCOUNTER — RESULT CHARGE (OUTPATIENT)
Age: 53
End: 2018-06-19

## 2018-06-19 ENCOUNTER — APPOINTMENT (OUTPATIENT)
Dept: NEUROLOGY | Facility: CLINIC | Age: 53
End: 2018-06-19
Payer: COMMERCIAL

## 2018-06-19 VITALS
HEIGHT: 71 IN | WEIGHT: 183 LBS | DIASTOLIC BLOOD PRESSURE: 84 MMHG | OXYGEN SATURATION: 97 % | SYSTOLIC BLOOD PRESSURE: 131 MMHG | BODY MASS INDEX: 25.62 KG/M2 | HEART RATE: 59 BPM

## 2018-06-19 PROCEDURE — 99215 OFFICE O/P EST HI 40 MIN: CPT

## 2018-06-19 RX ORDER — ESCITALOPRAM OXALATE 5 MG/1
5 TABLET, FILM COATED ORAL
Refills: 0 | Status: DISCONTINUED | COMMUNITY
End: 2018-06-19

## 2018-06-20 LAB
INR PPP: 1.8 RATIO
POCT-PROTHROMBIN TIME: 22 SECS

## 2018-06-27 ENCOUNTER — RESULT CHARGE (OUTPATIENT)
Age: 53
End: 2018-06-27

## 2018-06-27 LAB
INR PPP: 2.2 RATIO
POCT-PROTHROMBIN TIME: 27 SECS

## 2018-06-28 ENCOUNTER — MEDICATION RENEWAL (OUTPATIENT)
Age: 53
End: 2018-06-28

## 2018-07-03 ENCOUNTER — RX RENEWAL (OUTPATIENT)
Age: 53
End: 2018-07-03

## 2018-07-03 NOTE — PROGRESS NOTE ADULT - PROBLEM SELECTOR PLAN 3
BRANDON demonstrated L atrial clot for which patient started on hep gtt as above. Repeat BRANDON demonstrated some improving size of clot/decreasing in size and more notable hypoechogenicity for which suggestive of lysis/breakdown within the clot. As patient remains at significant risk for embolization of clot, patient remains on hep gtt with continued neuro monitoring q4h. Pending repeat BRANDON for evaluation of clot size for further management.    - c/w hep gtt but decreased to 10cc/hr this AM for supratherapeutic to 121.   - neuro checks q4h.   - working with PT on upper body focused, out of bed to chair and limited to bed rest with concern for at risk for embolization of clot.  -Repeat BRANDON today for evaluation of LA clot size. English

## 2018-07-04 ENCOUNTER — RESULT CHARGE (OUTPATIENT)
Age: 53
End: 2018-07-04

## 2018-07-05 ENCOUNTER — RX RENEWAL (OUTPATIENT)
Age: 53
End: 2018-07-05

## 2018-07-09 LAB
INR PPP: 2.1 RATIO
POCT-PROTHROMBIN TIME: 24.7 SECS

## 2018-07-10 ENCOUNTER — RESULT CHARGE (OUTPATIENT)
Age: 53
End: 2018-07-10

## 2018-07-11 ENCOUNTER — MEDICATION RENEWAL (OUTPATIENT)
Age: 53
End: 2018-07-11

## 2018-07-12 LAB
INR PPP: 2.2 RATIO
POCT-PROTHROMBIN TIME: 26.8 SECS

## 2018-07-18 PROBLEM — I49.9 CARDIAC ARRHYTHMIA, UNSPECIFIED: Chronic | Status: ACTIVE | Noted: 2018-04-05

## 2018-07-25 ENCOUNTER — RESULT CHARGE (OUTPATIENT)
Age: 53
End: 2018-07-25

## 2018-07-25 LAB
INR PPP: 2.3 RATIO
POCT-PROTHROMBIN TIME: 27.3 SECS

## 2018-07-26 ENCOUNTER — RX RENEWAL (OUTPATIENT)
Age: 53
End: 2018-07-26

## 2018-07-27 ENCOUNTER — APPOINTMENT (OUTPATIENT)
Dept: INTERNAL MEDICINE | Facility: CLINIC | Age: 53
End: 2018-07-27
Payer: COMMERCIAL

## 2018-07-27 VITALS
RESPIRATION RATE: 12 BRPM | BODY MASS INDEX: 24.78 KG/M2 | HEIGHT: 71 IN | HEART RATE: 58 BPM | DIASTOLIC BLOOD PRESSURE: 74 MMHG | TEMPERATURE: 98.6 F | SYSTOLIC BLOOD PRESSURE: 127 MMHG | OXYGEN SATURATION: 97 % | WEIGHT: 177 LBS

## 2018-07-27 PROCEDURE — 99215 OFFICE O/P EST HI 40 MIN: CPT

## 2018-07-31 ENCOUNTER — OTHER (OUTPATIENT)
Age: 53
End: 2018-07-31

## 2018-07-31 ENCOUNTER — APPOINTMENT (OUTPATIENT)
Dept: NEUROSURGERY | Facility: CLINIC | Age: 53
End: 2018-07-31
Payer: COMMERCIAL

## 2018-07-31 VITALS
SYSTOLIC BLOOD PRESSURE: 119 MMHG | RESPIRATION RATE: 18 BRPM | WEIGHT: 178 LBS | OXYGEN SATURATION: 98 % | DIASTOLIC BLOOD PRESSURE: 71 MMHG | BODY MASS INDEX: 24.92 KG/M2 | HEART RATE: 56 BPM | HEIGHT: 71 IN | TEMPERATURE: 98.1 F

## 2018-07-31 DIAGNOSIS — Z78.9 OTHER SPECIFIED HEALTH STATUS: ICD-10-CM

## 2018-07-31 DIAGNOSIS — Z82.61 FAMILY HISTORY OF ARTHRITIS: ICD-10-CM

## 2018-07-31 DIAGNOSIS — Z82.3 FAMILY HISTORY OF STROKE: ICD-10-CM

## 2018-07-31 PROCEDURE — 99215 OFFICE O/P EST HI 40 MIN: CPT

## 2018-08-06 LAB — HEPATITIS A IGG ANTIBODY: NONREACTIVE

## 2018-08-08 ENCOUNTER — RESULT CHARGE (OUTPATIENT)
Age: 53
End: 2018-08-08

## 2018-08-08 LAB
INR PPP: 2.3 RATIO
POCT-PROTHROMBIN TIME: 27.2 SECS

## 2018-08-20 NOTE — STROKE CODE NOTE - NIH STROKE SCALE: 11. EXTINCTION AND INATTENTION, QM
She has a Vitamin D level and TSH ordered and expected for October. If there is another one out there it can be cancelled.    (1) Visual, tactile, auditory, spatial, or personal inattention or extinction to bilateral simultaneous stimulation in one of the sensory modalities

## 2018-09-05 ENCOUNTER — RESULT CHARGE (OUTPATIENT)
Age: 53
End: 2018-09-05

## 2018-09-06 LAB
INR PPP: 2.9 RATIO
POCT-PROTHROMBIN TIME: 34.5 SECS

## 2018-09-14 ENCOUNTER — APPOINTMENT (OUTPATIENT)
Dept: INTERNAL MEDICINE | Facility: CLINIC | Age: 53
End: 2018-09-14
Payer: COMMERCIAL

## 2018-09-14 PROCEDURE — 90632 HEPA VACCINE ADULT IM: CPT

## 2018-09-14 PROCEDURE — 90471 IMMUNIZATION ADMIN: CPT

## 2018-09-16 ENCOUNTER — FORM ENCOUNTER (OUTPATIENT)
Age: 53
End: 2018-09-16

## 2018-09-17 ENCOUNTER — OUTPATIENT (OUTPATIENT)
Dept: OUTPATIENT SERVICES | Facility: HOSPITAL | Age: 53
LOS: 1 days | End: 2018-09-17
Payer: COMMERCIAL

## 2018-09-17 DIAGNOSIS — I67.89 OTHER CEREBROVASCULAR DISEASE: ICD-10-CM

## 2018-09-17 PROCEDURE — 93320 DOPPLER ECHO COMPLETE: CPT | Mod: 26

## 2018-09-17 PROCEDURE — 85610 PROTHROMBIN TIME: CPT

## 2018-09-17 PROCEDURE — 36415 COLL VENOUS BLD VENIPUNCTURE: CPT

## 2018-09-17 PROCEDURE — 85730 THROMBOPLASTIN TIME PARTIAL: CPT

## 2018-09-17 PROCEDURE — 93312 ECHO TRANSESOPHAGEAL: CPT | Mod: 26

## 2018-09-17 PROCEDURE — 93312 ECHO TRANSESOPHAGEAL: CPT

## 2018-09-17 PROCEDURE — 93325 DOPPLER ECHO COLOR FLOW MAPG: CPT | Mod: 26

## 2018-10-03 ENCOUNTER — RESULT CHARGE (OUTPATIENT)
Age: 53
End: 2018-10-03

## 2018-10-08 ENCOUNTER — RX RENEWAL (OUTPATIENT)
Age: 53
End: 2018-10-08

## 2018-10-10 ENCOUNTER — OTHER (OUTPATIENT)
Age: 53
End: 2018-10-10

## 2018-11-05 ENCOUNTER — APPOINTMENT (OUTPATIENT)
Dept: NEUROLOGY | Facility: CLINIC | Age: 53
End: 2018-11-05
Payer: COMMERCIAL

## 2018-11-05 VITALS
HEIGHT: 71 IN | BODY MASS INDEX: 25.62 KG/M2 | WEIGHT: 183 LBS | DIASTOLIC BLOOD PRESSURE: 73 MMHG | HEART RATE: 57 BPM | SYSTOLIC BLOOD PRESSURE: 128 MMHG | TEMPERATURE: 98.1 F | OXYGEN SATURATION: 97 %

## 2018-11-05 DIAGNOSIS — Z78.9 OTHER SPECIFIED HEALTH STATUS: ICD-10-CM

## 2018-11-05 PROCEDURE — 99214 OFFICE O/P EST MOD 30 MIN: CPT

## 2018-11-05 RX ORDER — ESCITALOPRAM OXALATE 10 MG/1
10 TABLET ORAL DAILY
Qty: 30 | Refills: 5 | Status: DISCONTINUED | COMMUNITY
Start: 2018-06-19 | End: 2018-11-05

## 2018-11-06 PROBLEM — Z78.9 NON-SMOKER: Status: ACTIVE | Noted: 2018-07-31

## 2018-11-13 LAB
INR PPP: 2.35 RATIO
PT BLD: 27.5 SEC

## 2018-11-16 ENCOUNTER — OTHER (OUTPATIENT)
Age: 53
End: 2018-11-16

## 2018-12-17 ENCOUNTER — RX RENEWAL (OUTPATIENT)
Age: 53
End: 2018-12-17

## 2018-12-18 ENCOUNTER — RESULT CHARGE (OUTPATIENT)
Age: 53
End: 2018-12-18

## 2018-12-18 ENCOUNTER — APPOINTMENT (OUTPATIENT)
Dept: NEUROSURGERY | Facility: CLINIC | Age: 53
End: 2018-12-18
Payer: COMMERCIAL

## 2018-12-19 ENCOUNTER — APPOINTMENT (OUTPATIENT)
Dept: NEUROSURGERY | Facility: CLINIC | Age: 53
End: 2018-12-19
Payer: COMMERCIAL

## 2018-12-19 VITALS
RESPIRATION RATE: 16 BRPM | HEART RATE: 89 BPM | WEIGHT: 183 LBS | DIASTOLIC BLOOD PRESSURE: 77 MMHG | BODY MASS INDEX: 25.62 KG/M2 | OXYGEN SATURATION: 98 % | SYSTOLIC BLOOD PRESSURE: 155 MMHG | HEIGHT: 71 IN | TEMPERATURE: 98.5 F

## 2018-12-19 LAB
INR PPP: 2.3 RATIO
POCT-PROTHROMBIN TIME: 27 SECS

## 2018-12-19 PROCEDURE — 99215 OFFICE O/P EST HI 40 MIN: CPT

## 2018-12-23 NOTE — PATIENT PROFILE ADULT. - NS SC CAGE ALCOHOL GUILTY ABOUT
no
76 yo  Male with pmh/o , CAD (CABG x 4, stents x 6- last done about 7 yrs ago at [facility])), HTN, Arthritis, gout, HLD, BPH, was on digoxin in past ( was taken off about 3-4 yrs ago)  Went to [facility] urgent care today for flu-like Sx and diarrhea - which stopped today (was on Abx for 10 days last month for URI ).  Found to be in rapid afib in 110s.  He converted to NSR after midnight.  Currently asymptomatic.  cardiac/pulm/abdomenal exams are benign.  Labs/imaging/EKG reviewed.  Will continue home meds.  CHADS/VASC is at least a 3, likely would benefit from NOAC.  TTE recently as outpt in spring with preserved LV Function and no LAE.  Appreciate cardiology.  Ordered stool sample for  C diff.

## 2019-01-04 ENCOUNTER — RX RENEWAL (OUTPATIENT)
Age: 54
End: 2019-01-04

## 2019-01-23 ENCOUNTER — RESULT CHARGE (OUTPATIENT)
Age: 54
End: 2019-01-23

## 2019-01-24 LAB
INR PPP: 2.4 RATIO
INR PPP: 2.5 RATIO
POCT-PROTHROMBIN TIME: 28.8 SECS
POCT-PROTHROMBIN TIME: 29.8 SECS

## 2019-02-27 ENCOUNTER — RESULT CHARGE (OUTPATIENT)
Age: 54
End: 2019-02-27

## 2019-03-08 LAB
INR PPP: 2.3 RATIO
POCT-PROTHROMBIN TIME: 27.4 SECS

## 2019-03-28 ENCOUNTER — RESULT CHARGE (OUTPATIENT)
Age: 54
End: 2019-03-28

## 2019-04-01 ENCOUNTER — RX RENEWAL (OUTPATIENT)
Age: 54
End: 2019-04-01

## 2019-04-01 LAB
INR PPP: 1.9 RATIO
POCT-PROTHROMBIN TIME: 22.4 SECS

## 2019-04-10 ENCOUNTER — RX RENEWAL (OUTPATIENT)
Age: 54
End: 2019-04-10

## 2019-04-11 ENCOUNTER — TRANSCRIPTION ENCOUNTER (OUTPATIENT)
Age: 54
End: 2019-04-11

## 2019-04-11 ENCOUNTER — LABORATORY RESULT (OUTPATIENT)
Age: 54
End: 2019-04-11

## 2019-04-11 ENCOUNTER — RX RENEWAL (OUTPATIENT)
Age: 54
End: 2019-04-11

## 2019-04-11 ENCOUNTER — APPOINTMENT (OUTPATIENT)
Dept: INTERNAL MEDICINE | Facility: CLINIC | Age: 54
End: 2019-04-11
Payer: COMMERCIAL

## 2019-04-11 VITALS
BODY MASS INDEX: 27.3 KG/M2 | OXYGEN SATURATION: 100 % | RESPIRATION RATE: 12 BRPM | TEMPERATURE: 97.7 F | DIASTOLIC BLOOD PRESSURE: 82 MMHG | WEIGHT: 195 LBS | SYSTOLIC BLOOD PRESSURE: 130 MMHG | HEART RATE: 54 BPM | HEIGHT: 71 IN

## 2019-04-11 VITALS — SYSTOLIC BLOOD PRESSURE: 120 MMHG | DIASTOLIC BLOOD PRESSURE: 70 MMHG

## 2019-04-11 PROCEDURE — 99215 OFFICE O/P EST HI 40 MIN: CPT

## 2019-04-11 PROCEDURE — 85610 PROTHROMBIN TIME: CPT | Mod: QW

## 2019-04-11 RX ORDER — PANTOPRAZOLE 40 MG/1
40 TABLET, DELAYED RELEASE ORAL DAILY
Qty: 90 | Refills: 0 | Status: DISCONTINUED | COMMUNITY
Start: 2018-07-05 | End: 2019-04-11

## 2019-04-11 NOTE — PHYSICAL EXAM
[No Acute Distress] : no acute distress [Well Nourished] : well nourished [Well-Appearing] : well-appearing [Well Developed] : well developed [Normal Sclera/Conjunctiva] : normal sclera/conjunctiva [PERRL] : pupils equal round and reactive to light [EOMI] : extraocular movements intact [Normal Outer Ear/Nose] : the outer ears and nose were normal in appearance [No JVD] : no jugular venous distention [Normal Oropharynx] : the oropharynx was normal [Thyroid Normal, No Nodules] : the thyroid was normal and there were no nodules present [No Lymphadenopathy] : no lymphadenopathy [Supple] : supple [Clear to Auscultation] : lungs were clear to auscultation bilaterally [No Respiratory Distress] : no respiratory distress  [No Accessory Muscle Use] : no accessory muscle use [Normal Rate] : normal rate  [Normal S1, S2] : normal S1 and S2 [No Murmur] : no murmur heard [Regular Rhythm] : with a regular rhythm [No Varicosities] : no varicosities [No Abdominal Bruit] : a ~M bruit was not heard ~T in the abdomen [No Carotid Bruits] : no carotid bruits [Pedal Pulses Present] : the pedal pulses are present [No Edema] : there was no peripheral edema [No Extremity Clubbing/Cyanosis] : no extremity clubbing/cyanosis [Soft] : abdomen soft [No Palpable Aorta] : no palpable aorta [No Masses] : no abdominal mass palpated [Non Tender] : non-tender [Non-distended] : non-distended [Normal Bowel Sounds] : normal bowel sounds [No HSM] : no HSM [Normal Posterior Cervical Nodes] : no posterior cervical lymphadenopathy [Normal Anterior Cervical Nodes] : no anterior cervical lymphadenopathy [No CVA Tenderness] : no CVA  tenderness [No Spinal Tenderness] : no spinal tenderness [No Joint Swelling] : no joint swelling [Grossly Normal Strength/Tone] : grossly normal strength/tone [No Rash] : no rash [Normal Gait] : normal gait [Coordination Grossly Intact] : coordination grossly intact [Normal Affect] : the affect was normal [Normal Insight/Judgement] : insight and judgment were intact [de-identified] : RIGHT FACIAL DROOP WITH FINDINGS OF RIGHT HEMIPARESIS OF CHEST, BACK MUSCLE

## 2019-04-11 NOTE — COUNSELING
[Good understanding] : Patient has a good understanding of lifestyle changes and the steps needed to achieve self management goals [None] : None [Activity counseling provided] : activity

## 2019-04-11 NOTE — REVIEW OF SYSTEMS
[Poor Libido] : poor libido [Impotence] : impotence [Anxiety] : anxiety [Negative] : Heme/Lymph [Dysuria] : no dysuria [Incontinence] : no incontinence [Hesitancy] : no hesitancy [Hematuria] : no hematuria [Nocturia] : no nocturia [Frequency] : no frequency

## 2019-04-14 DIAGNOSIS — D72.819 DECREASED WHITE BLOOD CELL COUNT, UNSPECIFIED: ICD-10-CM

## 2019-04-14 LAB
25(OH)D3 SERPL-MCNC: 31.9 NG/ML
ALBUMIN SERPL ELPH-MCNC: 4.5 G/DL
ALP BLD-CCNC: 54 U/L
ALT SERPL-CCNC: 17 U/L
ANION GAP SERPL CALC-SCNC: 13 MMOL/L
APPEARANCE: CLEAR
AST SERPL-CCNC: 26 U/L
BASOPHILS # BLD AUTO: 0.03 K/UL
BASOPHILS NFR BLD AUTO: 0.8 %
BILIRUB SERPL-MCNC: 0.6 MG/DL
BILIRUBIN URINE: NEGATIVE
BLOOD URINE: ABNORMAL
BUN SERPL-MCNC: 19 MG/DL
CALCIUM SERPL-MCNC: 9.6 MG/DL
CHLORIDE SERPL-SCNC: 103 MMOL/L
CHOLEST SERPL-MCNC: 126 MG/DL
CHOLEST/HDLC SERPL: 1.7 RATIO
CO2 SERPL-SCNC: 24 MMOL/L
COLOR: YELLOW
CREAT SERPL-MCNC: 1.5 MG/DL
CREAT SPEC-SCNC: 223 MG/DL
EOSINOPHIL # BLD AUTO: 0.24 K/UL
EOSINOPHIL NFR BLD AUTO: 6.7 %
ESTIMATED AVERAGE GLUCOSE: 111 MG/DL
FERRITIN SERPL-MCNC: 499 NG/ML
FOLATE SERPL-MCNC: 14.6 NG/ML
GLUCOSE QUALITATIVE U: NEGATIVE
GLUCOSE SERPL-MCNC: 77 MG/DL
GLUCOSE SERPL-MCNC: 85 MG/DL
HBA1C MFR BLD HPLC: 5.5 %
HCT VFR BLD CALC: 45.5 %
HDLC SERPL-MCNC: 76 MG/DL
HGB BLD-MCNC: 14.3 G/DL
IMM GRANULOCYTES NFR BLD AUTO: 0.3 %
KETONES URINE: NEGATIVE
LDLC SERPL CALC-MCNC: 40 MG/DL
LEUKOCYTE ESTERASE URINE: NEGATIVE
LYMPHOCYTES # BLD AUTO: 1.44 K/UL
LYMPHOCYTES NFR BLD AUTO: 40 %
MAN DIFF?: NORMAL
MCHC RBC-ENTMCNC: 28.4 PG
MCHC RBC-ENTMCNC: 31.4 GM/DL
MCV RBC AUTO: 90.3 FL
MICROALBUMIN 24H UR DL<=1MG/L-MCNC: <1.2 MG/DL
MICROALBUMIN/CREAT 24H UR-RTO: NORMAL MG/G
MONOCYTES # BLD AUTO: 0.28 K/UL
MONOCYTES NFR BLD AUTO: 7.8 %
NEUTROPHILS # BLD AUTO: 1.6 K/UL
NEUTROPHILS NFR BLD AUTO: 44.4 %
NITRITE URINE: NEGATIVE
PH URINE: 6
PLATELET # BLD AUTO: 199 K/UL
POTASSIUM SERPL-SCNC: 5.3 MMOL/L
PROT SERPL-MCNC: 7.2 G/DL
PROTEIN URINE: NEGATIVE
PSA FREE FLD-MCNC: 23 %
PSA FREE SERPL-MCNC: 0.21 NG/ML
PSA SERPL-MCNC: 0.89 NG/ML
RBC # BLD: 5.04 M/UL
RBC # FLD: 14 %
SODIUM SERPL-SCNC: 140 MMOL/L
SPECIFIC GRAVITY URINE: 1.03
T PALLIDUM AB SER QL IA: NEGATIVE
TRIGL SERPL-MCNC: 51 MG/DL
TSH SERPL-ACNC: 1.08 UIU/ML
UROBILINOGEN URINE: NORMAL
VIT B12 SERPL-MCNC: 502 PG/ML
WBC # FLD AUTO: 3.6 K/UL

## 2019-04-16 LAB
INR PPP: 2.5 RATIO
POCT-PROTHROMBIN TIME: 30.1 SECS

## 2019-04-17 LAB
TESTOST BND SERPL-MCNC: 15.8 PG/ML
TESTOST SERPL-MCNC: 653.3 NG/DL

## 2019-04-18 ENCOUNTER — APPOINTMENT (OUTPATIENT)
Dept: NEUROSURGERY | Facility: CLINIC | Age: 54
End: 2019-04-18
Payer: COMMERCIAL

## 2019-04-18 ENCOUNTER — APPOINTMENT (OUTPATIENT)
Dept: VASCULAR SURGERY | Facility: CLINIC | Age: 54
End: 2019-04-18
Payer: COMMERCIAL

## 2019-04-18 VITALS
BODY MASS INDEX: 27.3 KG/M2 | SYSTOLIC BLOOD PRESSURE: 124 MMHG | HEART RATE: 62 BPM | OXYGEN SATURATION: 98 % | HEIGHT: 71 IN | RESPIRATION RATE: 16 BRPM | WEIGHT: 195 LBS | DIASTOLIC BLOOD PRESSURE: 73 MMHG

## 2019-04-18 PROCEDURE — 93880 EXTRACRANIAL BILAT STUDY: CPT

## 2019-04-18 PROCEDURE — 99215 OFFICE O/P EST HI 40 MIN: CPT

## 2019-04-18 NOTE — PHYSICAL EXAM
[General Appearance - In No Acute Distress] : in no acute distress [General Appearance - Alert] : alert [Oriented To Time, Place, And Person] : oriented to person, place, and time [Impaired Insight] : insight and judgment were intact [Affect] : the affect was normal [Person] : oriented to person [Time] : oriented to time [Place] : oriented to place [Cranial Nerves Optic (II)] : visual acuity intact bilaterally,  pupils equal round and reactive to light [Cranial Nerves Oculomotor (III)] : extraocular motion intact [Cranial Nerves Vestibulocochlear (VIII)] : hearing was intact bilaterally [Cranial Nerves Glossopharyngeal (IX)] : tongue and palate midline [Cranial Nerves Accessory (XI - Cranial And Spinal)] : head turning and shoulder shrug symmetric [Cranial Nerves Hypoglossal (XII)] : there was no tongue deviation with protrusion [Extraocular Movements] : extraocular movements were intact [PERRL With Normal Accommodation] : pupils were equal in size, round, reactive to light, with normal accommodation [Full Visual Field] : full visual field [Neck Appearance] : the appearance of the neck was normal [Respiration, Rhythm And Depth] : normal respiratory rhythm and effort [] : no respiratory distress [Apical Impulse] : the apical impulse was normal [Exaggerated Use Of Accessory Muscles For Inspiration] : no accessory muscle use [Heart Sounds] : normal S1 and S2 [Heart Rate And Rhythm] : heart rate was normal and rhythm regular [Abnormal Walk] : normal gait [Skin Color & Pigmentation] : normal skin color and pigmentation [Skin Turgor] : normal skin turgor [FreeTextEntry6] : right UE 4/5 weakness, weak right hand grasp [FreeTextEntry5] : right facial asymmetry, right UE 4/5 weakness, weak right hand grasp; 5/5 on RLE, LLE, LUE.

## 2019-04-18 NOTE — ADDENDUM
[FreeTextEntry1] : 2019\par \par Sarai Mcfadden MD\par Director, Stroke\par [facility]\par [address]\[place] [address]\par \par Patient’s Name:  Andrea Valero\par : 1965\par \par Dear Dr. Mcfadden:\par \par I saw Mr. Valero in my office at [facility].  As you know, he is a 53 years-old right handed man who suffered an acute ischemic stroke in 2018.  At that time he was treated with systemic thrombolysis at [facility] and transferred to [facility] where he underwent mechanical thrombectomy and complete recanalization of an occluded left middle cerebral artery.  Work up for secondary stroke prevention revealed anticardiolipin antibodies and MTHFR mutation.  He was started on anticoagulation with warfarin and has been doing very well since then.  \par \par He is working out, lifting weights, cycling and running.  The strength of the right arm is significantly improved (4+) and language is also tremendously improved (he has rare word finding difficulty).  \par \par A carotid duplex from earlier today didn’t show carotid artery stenosis.\par \par He will return to my office in 2020 when we will repeat a carotid duplex.  \par \par Thank you for allowing us to participate in Mr. Valero’s care.  I will keep you updated at all times.\par \par Sincerely,\par \par \par Richard Mota MD\par Chief\par Neuro-Endovascular Surgery and \par Interventional Neuroradiology \par [facility]\par [address]\par 3rd Floor\[place] [address]\par T:  [phone]\par F:  [phone]\par

## 2019-04-18 NOTE — ASSESSMENT
[FreeTextEntry1] : Plan:\par 1. US carotid and RTO to review result in 1 year (April 2020). Advised patient to call the office 1 month prior to order the test.

## 2019-04-18 NOTE — REVIEW OF SYSTEMS
[As Noted in HPI] : as noted in HPI [Arm Weakness] : arm weakness [Facial Weakness] : facial weakness [Negative] : Musculoskeletal [Difficulty Walking] : no difficulty walking [Inability to Walk] : able to walk [Ataxia] : no ataxia [Melena] : no melena [Easy Bruising] : no tendency for easy bruising [Easy Bleeding] : no tendency for easy bleeding

## 2019-04-18 NOTE — HISTORY OF PRESENT ILLNESS
[FreeTextEntry1] : Right-handed 51 yo M who was taken to [facility] after he developed a sudden onset of right hemiparesis and global aphasia while at work. He was treated with IV tPA and was transferred to [facility] for further management. A CTA on arrival suggested occlusion of the distal M1 segment of the left MCA and the CT didn’t show hemorrhage or large completed infarction. He was diagnosed with acute left MCA CVA \par \par 4/5/18: s/p FEMORAL CEREBRAL ANGIOGRAM AND MECHANICAL THROMBECTOMY WITH PENUMBRA AND SOLITAIRE \par \par Noted to have L atrial appendage thrombus on BRANDON and positive anticardiolipin antibodies and MTHFR. Very strong family hx of strokes. Patient was discharged from [facility] on 4/30/18 to acute rehab on Warfarin 7.5mg QD and Lovenox 90mg BID.\par \par Repeat BRANDON 9/17/2018 showed resolution of left atrial appendage thrombus. \par \par He has residual symptoms of right arm weakness, mild dysarthria. He is participating in outpatient  OT and PT 2x weekly. No more speech therapy. He participated in a race, his speech and language are improved with rare word finding difficulty. He has also started swimming and bikes 22 miles.\par \par He remains on Coumadin (12mg M-W-F-Su; 9mg T-Th-Sa) managed by Dr. Miles. He is off statin. He presents today to review US carotid duplex. \par \par Handedness: RIGHT\par \par Patient Address:\par [address]\[place] [address]\par \par Neurologist:\par Dr. Sarai Mcfadden\par [address] [facility] 8th floor\[place] [address]\par \par PCP:\par Dr.John Oliver\par [facility] [place] \par [address]\[place] [address] \par Phone: [phone] \par \par Cardiologist:\par Dr. Mo Ding\par [address]\[place] [address]\par \par

## 2019-05-03 ENCOUNTER — APPOINTMENT (OUTPATIENT)
Dept: NEUROLOGY | Facility: CLINIC | Age: 54
End: 2019-05-03
Payer: COMMERCIAL

## 2019-05-03 VITALS
DIASTOLIC BLOOD PRESSURE: 87 MMHG | SYSTOLIC BLOOD PRESSURE: 139 MMHG | WEIGHT: 194 LBS | OXYGEN SATURATION: 100 % | TEMPERATURE: 97.9 F | HEIGHT: 71 IN | BODY MASS INDEX: 27.16 KG/M2 | HEART RATE: 56 BPM

## 2019-05-03 PROCEDURE — 99214 OFFICE O/P EST MOD 30 MIN: CPT

## 2019-05-06 ENCOUNTER — RESULT CHARGE (OUTPATIENT)
Age: 54
End: 2019-05-06

## 2019-05-06 ENCOUNTER — APPOINTMENT (OUTPATIENT)
Dept: UROLOGY | Facility: CLINIC | Age: 54
End: 2019-05-06
Payer: COMMERCIAL

## 2019-05-06 VITALS
DIASTOLIC BLOOD PRESSURE: 70 MMHG | SYSTOLIC BLOOD PRESSURE: 122 MMHG | OXYGEN SATURATION: 99 % | RESPIRATION RATE: 12 BRPM | HEART RATE: 56 BPM | TEMPERATURE: 98.6 F

## 2019-05-06 DIAGNOSIS — R31.29 OTHER MICROSCOPIC HEMATURIA: ICD-10-CM

## 2019-05-06 DIAGNOSIS — N52.9 MALE ERECTILE DYSFUNCTION, UNSPECIFIED: ICD-10-CM

## 2019-05-06 PROCEDURE — 99204 OFFICE O/P NEW MOD 45 MIN: CPT

## 2019-05-06 NOTE — HISTORY OF PRESENT ILLNESS
[FreeTextEntry1] : Very pleasant 53 year -year old gentleman on Coumadin who presents for evaluation of microscopic hematuria found on urinalysis approximately 1 month ago. Patient reports no history of gross hematuria.  No flank pain. No suprapubic pain. No dysuria.  No urinary frequency, urgency.  Nocturia x 0. No history of urinary tract infections or renal impairment. No aggravating or alleviating factors that he knows of contributing to hematuria.\par \par No family history of  malignancy, including prostate cancer.  No family history of nephrolithiasis. Does not smoke.\par \par Recent US demonstrated small simple renal cyst, bladder wall thickening, prostate size of 33 cc.\par  [Urinary Retention] : no urinary retention [Urinary Urgency] : no urinary urgency [Nocturia] : no nocturia [Straining] : no straining [Urinary Frequency] : no urinary frequency [Weak Stream] : no weak stream [Intermittency] : no intermittency [Dysuria] : no dysuria [Bladder Spasm] : no bladder spasm [Hematuria - Gross] : no gross hematuria [Hematuria - Microscopic] : microscopic hematuria [Fever] : no fever [Flank Pain] : no flank pain [Abdominal Pain] : no abdominal pain [Fatigue] : no fatigue [Nausea] : no nausea [Anorexia] : no anorexia

## 2019-05-06 NOTE — PHYSICAL EXAM
[General Appearance - Well Developed] : well developed [General Appearance - Well Nourished] : well nourished [Well Groomed] : well groomed [General Appearance - In No Acute Distress] : no acute distress [Normal Appearance] : normal appearance [Edema] : no peripheral edema [Exaggerated Use Of Accessory Muscles For Inspiration] : no accessory muscle use [Respiration, Rhythm And Depth] : normal respiratory rhythm and effort [Abdomen Tenderness] : non-tender [Abdomen Soft] : soft [Costovertebral Angle Tenderness] : no ~M costovertebral angle tenderness [Urethral Meatus] : meatus normal [Urinary Bladder Findings] : the bladder was normal on palpation [Scrotum] : the scrotum was normal [Testes Mass (___cm)] : there were no testicular masses [No Prostate Nodules] : no prostate nodules [Normal Station and Gait] : the gait and station were normal for the patient's age [] : no rash [Oriented To Time, Place, And Person] : oriented to person, place, and time [Affect] : the affect was normal [No Focal Deficits] : no focal deficits [No Palpable Adenopathy] : no palpable adenopathy [Mood] : the mood was normal [Not Anxious] : not anxious

## 2019-05-06 NOTE — ASSESSMENT
[FreeTextEntry1] : Very pleasant 53-year-old gentleman with microscopic hematuria\par -urinalysis reviewed, with repeat with microscopic exam\par -urine culture\par -Renal US images reviewed\par -cystoscopy\par -Extensive discussion of the potential etiologies of microscopic hematuria, as well as the need to complete full work up.  Patient understands and wishes to proceed.\par -Discussed the natural history of BPH, as well as typical symptoms of an enlarged prostate. Discussed potential complications that could arise from BPH, including but not limited to urinary tract infections, bladder stones, and renal impairment. Given that he is asymptomatic we will hold off on treatment at this time.

## 2019-05-07 LAB
APPEARANCE: CLEAR
BACTERIA: NEGATIVE
BASOPHILS # BLD AUTO: 0.03 K/UL
BASOPHILS NFR BLD AUTO: 0.8 %
BILIRUBIN URINE: NEGATIVE
BLOOD URINE: NEGATIVE
COLOR: YELLOW
EOSINOPHIL # BLD AUTO: 0.18 K/UL
EOSINOPHIL NFR BLD AUTO: 4.5 %
GLUCOSE QUALITATIVE U: NEGATIVE
HCT VFR BLD CALC: 44.3 %
HGB BLD-MCNC: 13.7 G/DL
HYALINE CASTS: 0 /LPF
IMM GRANULOCYTES NFR BLD AUTO: 0 %
INR PPP: 2 RATIO
KETONES URINE: NEGATIVE
LEUKOCYTE ESTERASE URINE: NEGATIVE
LYMPHOCYTES # BLD AUTO: 1.4 K/UL
LYMPHOCYTES NFR BLD AUTO: 35.4 %
MAN DIFF?: NORMAL
MCHC RBC-ENTMCNC: 28.4 PG
MCHC RBC-ENTMCNC: 30.9 GM/DL
MCV RBC AUTO: 91.9 FL
MICROSCOPIC-UA: NORMAL
MONOCYTES # BLD AUTO: 0.41 K/UL
MONOCYTES NFR BLD AUTO: 10.4 %
NEUTROPHILS # BLD AUTO: 1.94 K/UL
NEUTROPHILS NFR BLD AUTO: 48.9 %
NITRITE URINE: NEGATIVE
PH URINE: 6.5
PLATELET # BLD AUTO: 239 K/UL
POCT-PROTHROMBIN TIME: 23.6 SECS
PROTEIN URINE: NEGATIVE
RBC # BLD: 4.82 M/UL
RBC # FLD: 14.1 %
RED BLOOD CELLS URINE: 1 /HPF
SPECIFIC GRAVITY URINE: 1.01
SQUAMOUS EPITHELIAL CELLS: 0 /HPF
UROBILINOGEN URINE: NORMAL
WBC # FLD AUTO: 3.96 K/UL
WHITE BLOOD CELLS URINE: 0 /HPF

## 2019-05-07 NOTE — PHYSICAL EXAM
[FreeTextEntry1] : Constitutional: alert, well nourished, well developed and healthy appearing . MILD DYSARTHRIA, MILD APHASIA- improving\par Psychiatric: oriented to person, place, and time, insight and judgment were intact, the affect was normal and the mood was normal. \par Neurologic: \par Orientation: oriented to person, oriented to place and oriented to time. \par Cranial Nerves: visual acuity intact bilaterally, visual fields full to confrontation, pupils equal round and reactive to light, extraocular motion intact, hearing was intact bilaterally, tongue and palate midline, head turning and shoulder shrug symmetric and there was no tongue deviation with protrusion . RIGHT FACIAL DROOP- SENSATION INTACT BILATERAL. \par Motor: muscle tone was normal in all four extremities. \par Motor Strength:. a right-sided pronator drift was present. no pronator drift on the left. there was weakness of the right upper extremity. strength was normal in the left upper extremity. strength was normal in the right lower extremity. strength was normal in the left lower extremity. \par Upper extremity strength: \par Right upper extremity: biceps 5-/5, triceps 5-/5. \par Left upper extremity biceps 5/5, triceps 5/5. SPASTICITY AT RIGHT WRIST \par Sensory exam: light touch was intact and pain and temperature was intact . Romberg's sign was negative. \par Coordination:. normal gait. balance was intact. balance was intact. there was no past-pointing. no tremor present. Finger to nose dysmetria was not present. \par Deep tendon reflexes: \par Biceps right 2+. Biceps left 2+.  \par Brachioradialis right 2+. Brachioradialis left 2+.  \par Patella right 2+. Patella left 2+.  \par Eyes: the sclera and conjunctiva were normal, pupils were equal in size, round, reactive to light, with normal accommodation, extraocular movements were intact and full visual field. \par Musculoskeletal: normal gait .  mild RUE weakness

## 2019-05-07 NOTE — HISTORY OF PRESENT ILLNESS
[FreeTextEntry1] : Patient presents for a follow up visit. \par \par On 4/5/2018 he sustained a Left MCA occlusion with symptoms of aphasia, facial droop, right hemiplegia and left gaze preference. He received IV TPA and underwent a thrombectomy.  BRANDON revealed a large left atrial appendage thrombus. He was started on IV heparin and subsequently Coumadin. He remains on Coumadin (12mg M-W-F-Obrien; 9mg T-Th-Sa) managed by Dr. Miles. \par \par During hospitalization he was found to have positive anticardiolipin antibodies and heterogenous MTHFR gene mutation. On 5/1/2018 he was discharged to acute rehab at [facility].\par \par Since last visit MeRadha Valero is doing extremely well. He recently travelled abroad with his wife for a trip to [facility]. Flight was tolerated well and he had a nice time there. 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 He is performing less manual labor than prior to CVA but feels he is able to perform all current job duties without difficulty. Repeat BRANDON 9/17/2018 showed resolution of left atrial appendage thrombus. \par \par He has residual symptoms of right arm weakness, mild dysarthria and mild aphasia. He continues PT and OT. Reports doing well. Mood is good would like to stop lexapro due to mood being good + ED.\par \par

## 2019-05-07 NOTE — DISCUSSION/SUMMARY
[FreeTextEntry1] : Neurologically stable, no new or worsening of deficits.\par \par Has residual mild aphasia and mild dysarthria as well as right facial droop and RUE drift. \par \par Mood is stable- will taper off Lexapro- current dose is 10mg daily- advised to take 5mg PO daily x 2 weeks then D/C \par \par Continue to follow with Dr. Oliver for AC\par \par RTC 6 months or earlier as symptoms dictate\par \par Cardiologist: Dr. Ding Ph: [phone]\par Fax: [phone]\par

## 2019-05-13 ENCOUNTER — RX RENEWAL (OUTPATIENT)
Age: 54
End: 2019-05-13

## 2019-06-17 ENCOUNTER — RX RENEWAL (OUTPATIENT)
Age: 54
End: 2019-06-17

## 2019-06-18 ENCOUNTER — RESULT CHARGE (OUTPATIENT)
Age: 54
End: 2019-06-18

## 2019-06-18 ENCOUNTER — MEDICATION RENEWAL (OUTPATIENT)
Age: 54
End: 2019-06-18

## 2019-06-19 LAB
INR PPP: 1.6 RATIO
POCT-PROTHROMBIN TIME: 19.1 SECS

## 2019-06-21 NOTE — PROGRESS NOTE ADULT - PROBLEM SELECTOR PLAN 1
MCA stroke s/p TPA, CT head notable for M2 occlusion s/p thrombectomy. A1c 5.1, HDL 65/LDL 90/ . Hypercoagulable work up performed and notable for + anticardiolipin and MTHFR gene mutation for which he was evaluated by heme/onc. Noted to have left atrial appendage thrombus/mass for which patient remains on hep gtt. 2nd BRANDON (on 4/18) demonstrated decrease in size from initial study with some suggestion of breakdown within the clot. 3rd BRANDON performed with no gross changes in clot size. CT cardiac structural demonstrated confirmed clot. Pending further decision on management of clot but for now will stay on hep gtt and frequent neuro checks for increased risk for embolization of clot.   -Neuro exam unchanged. RUE strength- notable for 4/5 strength in elbow flex/ext and shoulder abd. Deficits in /finger movement 1/5. Facial droop on R side improving though present.  - c/w hep gtt at 10cc/hr with PTT goal 60-90.  - C/w Lipitor and aspirin 81mg PO.  - SBP goal < 160, SBP as remained stable.  - c/w monitoring q4h neuro checks  - + hypercoaguable/ anticardiolipin+/MTHFR, remains on Hep gtt   - c/w lexapro for motor recovery and atricept for neurosignaling s/p CVA  - c/w PT to work with patient on upper body motor. Bed rest and out of bed to chair, reduce walking around. Incentive spirometry at bedside. No MCA stroke s/p TPA, CT head notable for M2 occlusion s/p thrombectomy. A1c 5.1, HDL 65/LDL 90/ . Hypercoagulable work up performed and notable for + anticardiolipin and MTHFR gene mutation for which he was evaluated by heme/onc. Noted to have left atrial appendage thrombus/mass for which patient remains on hep gtt. 2nd BRANDON (on 4/18) demonstrated decrease in size from initial study with some suggestion of breakdown within the clot. 3rd BRANDON performed with no gross changes in clot size. CT cardiac structural demonstrated confirmed clot. Pending further decision on management of clot but for now will stay on hep gtt and frequent neuro checks for increased risk for embolization of clot.   - Neuro exam unchanged. RUE strength- notable for 4/5 strength in elbow flex/ext and shoulder abd. Deficits in /finger movement 1/5. Facial droop on R side improving though present.  - c/w hep gtt at 11cc/hr with PTT goal 60-90. Bridge with 5mg Coumadin  - C/w Lipitor and aspirin 81mg PO.  - SBP goal < 160, SBP as remained stable.  - c/w monitoring q4h neuro checks  - + hypercoaguable/ anticardiolipin+/MTHFR, remains on Hep gtt   - c/w lexapro for motor recovery and atricept for neurosignaling s/p CVA  - c/w PT to work with patient on upper body motor. Bed rest and out of bed to chair, reduce walking around. Incentive spirometry at bedside.  - Possible d/c to rehab Monday

## 2019-06-25 ENCOUNTER — RESULT CHARGE (OUTPATIENT)
Age: 54
End: 2019-06-25

## 2019-06-25 LAB
INR PPP: 1.9 RATIO
POCT-PROTHROMBIN TIME: 23 SECS

## 2019-07-09 ENCOUNTER — RESULT CHARGE (OUTPATIENT)
Age: 54
End: 2019-07-09

## 2019-07-11 LAB
INR PPP: 2 RATIO
POCT-PROTHROMBIN TIME: 23.8 SECS

## 2019-08-08 ENCOUNTER — RESULT CHARGE (OUTPATIENT)
Age: 54
End: 2019-08-08

## 2019-08-09 LAB
INR PPP: 1.6 RATIO
POCT-PROTHROMBIN TIME: 19.7 SECS

## 2019-09-03 ENCOUNTER — RESULT CHARGE (OUTPATIENT)
Age: 54
End: 2019-09-03

## 2019-09-04 LAB
INR PPP: 2.3 RATIO
POCT-PROTHROMBIN TIME: 27.9 SECS

## 2019-10-08 ENCOUNTER — RESULT CHARGE (OUTPATIENT)
Age: 54
End: 2019-10-08

## 2019-10-09 LAB
INR PPP: 2 RATIO
POCT-PROTHROMBIN TIME: 24 SECS

## 2019-10-17 ENCOUNTER — NON-APPOINTMENT (OUTPATIENT)
Age: 54
End: 2019-10-17

## 2019-10-17 ENCOUNTER — APPOINTMENT (OUTPATIENT)
Dept: INTERNAL MEDICINE | Facility: CLINIC | Age: 54
End: 2019-10-17
Payer: COMMERCIAL

## 2019-10-17 VITALS
TEMPERATURE: 97.7 F | RESPIRATION RATE: 12 BRPM | OXYGEN SATURATION: 99 % | SYSTOLIC BLOOD PRESSURE: 157 MMHG | DIASTOLIC BLOOD PRESSURE: 86 MMHG | WEIGHT: 188 LBS | BODY MASS INDEX: 26.32 KG/M2 | HEIGHT: 71 IN | HEART RATE: 54 BPM

## 2019-10-17 VITALS — DIASTOLIC BLOOD PRESSURE: 80 MMHG | SYSTOLIC BLOOD PRESSURE: 120 MMHG

## 2019-10-17 PROCEDURE — 99214 OFFICE O/P EST MOD 30 MIN: CPT | Mod: 25

## 2019-10-17 PROCEDURE — 90682 RIV4 VACC RECOMBINANT DNA IM: CPT

## 2019-10-17 PROCEDURE — 93000 ELECTROCARDIOGRAM COMPLETE: CPT

## 2019-10-17 PROCEDURE — G0008: CPT

## 2019-10-17 RX ORDER — ESCITALOPRAM OXALATE 10 MG/1
10 TABLET ORAL
Qty: 30 | Refills: 0 | Status: DISCONTINUED | COMMUNITY
Start: 2019-05-13 | End: 2019-10-17

## 2019-10-17 NOTE — PHYSICAL EXAM
[No Acute Distress] : no acute distress [Well Nourished] : well nourished [Well Developed] : well developed [Normal Sclera/Conjunctiva] : normal sclera/conjunctiva [Well-Appearing] : well-appearing [PERRL] : pupils equal round and reactive to light [Normal Outer Ear/Nose] : the outer ears and nose were normal in appearance [EOMI] : extraocular movements intact [Normal Oropharynx] : the oropharynx was normal [No JVD] : no jugular venous distention [Supple] : supple [No Lymphadenopathy] : no lymphadenopathy [Thyroid Normal, No Nodules] : the thyroid was normal and there were no nodules present [No Respiratory Distress] : no respiratory distress  [No Accessory Muscle Use] : no accessory muscle use [Clear to Auscultation] : lungs were clear to auscultation bilaterally [Normal Rate] : normal rate  [Regular Rhythm] : with a regular rhythm [Normal S1, S2] : normal S1 and S2 [No Murmur] : no murmur heard [No Carotid Bruits] : no carotid bruits [No Abdominal Bruit] : a ~M bruit was not heard ~T in the abdomen [No Varicosities] : no varicosities [No Edema] : there was no peripheral edema [Pedal Pulses Present] : the pedal pulses are present [No Palpable Aorta] : no palpable aorta [No Extremity Clubbing/Cyanosis] : no extremity clubbing/cyanosis [Non Tender] : non-tender [Soft] : abdomen soft [No Masses] : no abdominal mass palpated [Non-distended] : non-distended [No HSM] : no HSM [Normal Bowel Sounds] : normal bowel sounds [Normal Posterior Cervical Nodes] : no posterior cervical lymphadenopathy [Normal Anterior Cervical Nodes] : no anterior cervical lymphadenopathy [No Spinal Tenderness] : no spinal tenderness [No CVA Tenderness] : no CVA  tenderness [Grossly Normal Strength/Tone] : grossly normal strength/tone [No Joint Swelling] : no joint swelling [No Rash] : no rash [Coordination Grossly Intact] : coordination grossly intact [Normal Gait] : normal gait [Normal Affect] : the affect was normal [Normal Insight/Judgement] : insight and judgment were intact [de-identified] : RESIDUAL WEAKNESS RUE.

## 2019-10-19 LAB
25(OH)D3 SERPL-MCNC: 40.6 NG/ML
ALBUMIN SERPL ELPH-MCNC: 4.3 G/DL
ALP BLD-CCNC: 52 U/L
ALT SERPL-CCNC: 10 U/L
ANION GAP SERPL CALC-SCNC: 12 MMOL/L
APPEARANCE: CLEAR
AST SERPL-CCNC: 18 U/L
BILIRUB SERPL-MCNC: 0.5 MG/DL
BILIRUBIN URINE: NEGATIVE
BLOOD URINE: NEGATIVE
BUN SERPL-MCNC: 18 MG/DL
CALCIUM SERPL-MCNC: 9.5 MG/DL
CHLORIDE SERPL-SCNC: 101 MMOL/L
CHOLEST SERPL-MCNC: 183 MG/DL
CHOLEST/HDLC SERPL: 2 RATIO
CO2 SERPL-SCNC: 25 MMOL/L
COLOR: YELLOW
CREAT SERPL-MCNC: 1.37 MG/DL
CREAT SPEC-SCNC: 133 MG/DL
ESTIMATED AVERAGE GLUCOSE: 105 MG/DL
FERRITIN SERPL-MCNC: 416 NG/ML
FOLATE SERPL-MCNC: 12.4 NG/ML
GLUCOSE QUALITATIVE U: NEGATIVE
GLUCOSE SERPL-MCNC: 80 MG/DL
GLUCOSE SERPL-MCNC: 86 MG/DL
HBA1C MFR BLD HPLC: 5.3 %
HDLC SERPL-MCNC: 93 MG/DL
KETONES URINE: NEGATIVE
LDLC SERPL CALC-MCNC: 75 MG/DL
LEUKOCYTE ESTERASE URINE: NEGATIVE
MICROALBUMIN 24H UR DL<=1MG/L-MCNC: <1.2 MG/DL
MICROALBUMIN/CREAT 24H UR-RTO: NORMAL MG/G
NITRITE URINE: NEGATIVE
PH URINE: 6
POTASSIUM SERPL-SCNC: 4.9 MMOL/L
PROT SERPL-MCNC: 7.1 G/DL
PROTEIN URINE: NEGATIVE
PSA FREE FLD-MCNC: 22 %
PSA FREE SERPL-MCNC: 0.22 NG/ML
PSA SERPL-MCNC: 0.98 NG/ML
SODIUM SERPL-SCNC: 138 MMOL/L
SPECIFIC GRAVITY URINE: 1.02
TRIGL SERPL-MCNC: 77 MG/DL
TSH SERPL-ACNC: 0.9 UIU/ML
UROBILINOGEN URINE: NORMAL
VIT B12 SERPL-MCNC: 385 PG/ML

## 2019-11-15 ENCOUNTER — APPOINTMENT (OUTPATIENT)
Dept: NEUROLOGY | Facility: CLINIC | Age: 54
End: 2019-11-15

## 2019-11-26 LAB
BASOPHILS # BLD AUTO: 0.02 K/UL
BASOPHILS NFR BLD AUTO: 0.6 %
EOSINOPHIL # BLD AUTO: 0.1 K/UL
EOSINOPHIL NFR BLD AUTO: 2.8 %
HCT VFR BLD CALC: 46.5 %
HGB BLD-MCNC: 14.6 G/DL
IMM GRANULOCYTES NFR BLD AUTO: 0.6 %
LYMPHOCYTES # BLD AUTO: 1.29 K/UL
LYMPHOCYTES NFR BLD AUTO: 36.2 %
MAN DIFF?: NORMAL
MCHC RBC-ENTMCNC: 28.2 PG
MCHC RBC-ENTMCNC: 31.4 GM/DL
MCV RBC AUTO: 89.9 FL
MONOCYTES # BLD AUTO: 0.33 K/UL
MONOCYTES NFR BLD AUTO: 9.3 %
NEUTROPHILS # BLD AUTO: 1.8 K/UL
NEUTROPHILS NFR BLD AUTO: 50.5 %
PLATELET # BLD AUTO: 234 K/UL
RBC # BLD: 5.17 M/UL
RBC # FLD: 14 %
WBC # FLD AUTO: 3.56 K/UL

## 2019-11-27 ENCOUNTER — RESULT CHARGE (OUTPATIENT)
Age: 54
End: 2019-11-27

## 2019-11-27 LAB
INR PPP: 2.5 RATIO
POCT-PROTHROMBIN TIME: 29.6 SECS

## 2020-01-03 ENCOUNTER — RESULT CHARGE (OUTPATIENT)
Age: 55
End: 2020-01-03

## 2020-01-03 LAB
INR PPP: 2 RATIO
POCT-PROTHROMBIN TIME: 24.3 SECS

## 2020-02-03 ENCOUNTER — RESULT CHARGE (OUTPATIENT)
Age: 55
End: 2020-02-03

## 2020-02-04 LAB
INR PPP: 2.4 RATIO
POCT-PROTHROMBIN TIME: 28.3 SECS

## 2020-02-07 ENCOUNTER — APPOINTMENT (OUTPATIENT)
Dept: NEUROLOGY | Facility: CLINIC | Age: 55
End: 2020-02-07

## 2020-02-07 VITALS
HEART RATE: 57 BPM | DIASTOLIC BLOOD PRESSURE: 82 MMHG | OXYGEN SATURATION: 99 % | SYSTOLIC BLOOD PRESSURE: 155 MMHG | WEIGHT: 185 LBS | TEMPERATURE: 98.1 F | HEIGHT: 71 IN | BODY MASS INDEX: 25.9 KG/M2

## 2020-04-07 ENCOUNTER — RESULT CHARGE (OUTPATIENT)
Age: 55
End: 2020-04-07

## 2020-04-10 LAB
INR PPP: 2.9 RATIO
POCT-PROTHROMBIN TIME: 34.4 SECS

## 2020-05-11 ENCOUNTER — APPOINTMENT (OUTPATIENT)
Dept: INTERNAL MEDICINE | Facility: CLINIC | Age: 55
End: 2020-05-11
Payer: COMMERCIAL

## 2020-05-11 VITALS — RESPIRATION RATE: 12 BRPM

## 2020-05-11 PROCEDURE — 99214 OFFICE O/P EST MOD 30 MIN: CPT | Mod: 95

## 2020-05-11 NOTE — REVIEW OF SYSTEMS
[Impotence] : impotence [Poor Libido] : poor libido [Anxiety] : anxiety [Negative] : Heme/Lymph [Dysuria] : no dysuria [Incontinence] : no incontinence [Hesitancy] : no hesitancy [Nocturia] : no nocturia [Hematuria] : no hematuria [Frequency] : no frequency

## 2020-05-11 NOTE — PHYSICAL EXAM
[No Acute Distress] : no acute distress [Well Nourished] : well nourished [Normal Sclera/Conjunctiva] : normal sclera/conjunctiva [Well-Appearing] : well-appearing [Well Developed] : well developed [Normal Outer Ear/Nose] : the outer ears and nose were normal in appearance [Normal Oropharynx] : the oropharynx was normal [Supple] : supple [No Respiratory Distress] : no respiratory distress  [No Accessory Muscle Use] : no accessory muscle use [No Edema] : there was no peripheral edema [Soft] : abdomen soft [No Focal Deficits] : no focal deficits [No Rash] : no rash [Alert and Oriented x3] : oriented to person, place, and time [de-identified] : OBSERVATIONAL

## 2020-05-12 NOTE — PROGRESS NOTE ADULT - PROBLEM SELECTOR PLAN 4
Hx of palpitations, EKG notable for NSR with 1st degree AV block- evaluated as outpatient by cardiology. With LA thrombus- noted to be decreasing on repeat BRANDON- pending additional BRANDON for evaluation. Remains without palpitations. developmental considerations

## 2020-05-19 ENCOUNTER — APPOINTMENT (OUTPATIENT)
Dept: INTERNAL MEDICINE | Facility: CLINIC | Age: 55
End: 2020-05-19
Payer: COMMERCIAL

## 2020-05-19 VITALS — SYSTOLIC BLOOD PRESSURE: 120 MMHG | DIASTOLIC BLOOD PRESSURE: 80 MMHG

## 2020-05-19 VITALS
WEIGHT: 187 LBS | OXYGEN SATURATION: 99 % | HEIGHT: 71 IN | RESPIRATION RATE: 12 BRPM | DIASTOLIC BLOOD PRESSURE: 84 MMHG | HEART RATE: 57 BPM | SYSTOLIC BLOOD PRESSURE: 161 MMHG | TEMPERATURE: 98.2 F | BODY MASS INDEX: 26.18 KG/M2

## 2020-05-19 DIAGNOSIS — U07.1 COVID-19: ICD-10-CM

## 2020-05-19 PROCEDURE — 99214 OFFICE O/P EST MOD 30 MIN: CPT

## 2020-05-19 NOTE — REVIEW OF SYSTEMS
[Poor Libido] : poor libido [Impotence] : impotence [Anxiety] : anxiety [Negative] : Heme/Lymph [Dysuria] : no dysuria [Incontinence] : no incontinence [Hesitancy] : no hesitancy [Nocturia] : no nocturia [Hematuria] : no hematuria [Frequency] : no frequency

## 2020-05-19 NOTE — PHYSICAL EXAM
[No Acute Distress] : no acute distress [Well Nourished] : well nourished [Well Developed] : well developed [Well-Appearing] : well-appearing [Normal Sclera/Conjunctiva] : normal sclera/conjunctiva [Supple] : supple [Normal Oropharynx] : the oropharynx was normal [Normal Outer Ear/Nose] : the outer ears and nose were normal in appearance [No Respiratory Distress] : no respiratory distress  [No Accessory Muscle Use] : no accessory muscle use [No Edema] : there was no peripheral edema [Soft] : abdomen soft [No Rash] : no rash [Alert and Oriented x3] : oriented to person, place, and time [No Focal Deficits] : no focal deficits [de-identified] : OBSERVATIONAL

## 2020-05-22 ENCOUNTER — APPOINTMENT (OUTPATIENT)
Dept: INTERNAL MEDICINE | Facility: CLINIC | Age: 55
End: 2020-05-22

## 2020-05-22 LAB
25(OH)D3 SERPL-MCNC: 45.8 NG/ML
ALBUMIN SERPL ELPH-MCNC: 4.3 G/DL
ALP BLD-CCNC: 57 U/L
ALT SERPL-CCNC: 21 U/L
ANION GAP SERPL CALC-SCNC: 10 MMOL/L
APPEARANCE: CLEAR
AST SERPL-CCNC: 25 U/L
BACTERIA: NEGATIVE
BASOPHILS # BLD AUTO: 0.02 K/UL
BASOPHILS NFR BLD AUTO: 0.6 %
BILIRUB SERPL-MCNC: 0.6 MG/DL
BILIRUBIN URINE: NEGATIVE
BLOOD URINE: NEGATIVE
BUN SERPL-MCNC: 17 MG/DL
CALCIUM SERPL-MCNC: 9.5 MG/DL
CHLORIDE SERPL-SCNC: 102 MMOL/L
CHOLEST SERPL-MCNC: 129 MG/DL
CHOLEST/HDLC SERPL: 1.4 RATIO
CO2 SERPL-SCNC: 27 MMOL/L
COLOR: NORMAL
CREAT SERPL-MCNC: 1.41 MG/DL
CREAT SPEC-SCNC: 175 MG/DL
EOSINOPHIL # BLD AUTO: 0.33 K/UL
EOSINOPHIL NFR BLD AUTO: 9.5 %
ESTIMATED AVERAGE GLUCOSE: 108 MG/DL
FERRITIN SERPL-MCNC: 298 NG/ML
FOLATE SERPL-MCNC: 9.8 NG/ML
GLUCOSE QUALITATIVE U: NEGATIVE
GLUCOSE SERPL-MCNC: 85 MG/DL
GLUCOSE SERPL-MCNC: 90 MG/DL
HBA1C MFR BLD HPLC: 5.4 %
HCT VFR BLD CALC: 42 %
HDLC SERPL-MCNC: 90 MG/DL
HGB BLD-MCNC: 13.4 G/DL
HYALINE CASTS: 0 /LPF
IMM GRANULOCYTES NFR BLD AUTO: 0.3 %
IRON SERPL-MCNC: 71 UG/DL
KETONES URINE: NEGATIVE
LDLC SERPL CALC-MCNC: 30 MG/DL
LEUKOCYTE ESTERASE URINE: NEGATIVE
LYMPHOCYTES # BLD AUTO: 1.26 K/UL
LYMPHOCYTES NFR BLD AUTO: 36.4 %
MAN DIFF?: NORMAL
MCHC RBC-ENTMCNC: 28.6 PG
MCHC RBC-ENTMCNC: 31.9 GM/DL
MCV RBC AUTO: 89.7 FL
MICROALBUMIN 24H UR DL<=1MG/L-MCNC: <1.2 MG/DL
MICROALBUMIN/CREAT 24H UR-RTO: NORMAL MG/G
MICROSCOPIC-UA: NORMAL
MONOCYTES # BLD AUTO: 0.41 K/UL
MONOCYTES NFR BLD AUTO: 11.8 %
NEUTROPHILS # BLD AUTO: 1.43 K/UL
NEUTROPHILS NFR BLD AUTO: 41.4 %
NITRITE URINE: NEGATIVE
PH URINE: 6
PLATELET # BLD AUTO: 246 K/UL
POTASSIUM SERPL-SCNC: 4.7 MMOL/L
PROT SERPL-MCNC: 7.2 G/DL
PROTEIN URINE: NEGATIVE
RBC # BLD: 4.68 M/UL
RBC # FLD: 14.3 %
RED BLOOD CELLS URINE: 2 /HPF
SARS-COV-2 IGG SERPL IA-ACNC: 6.7 INDEX
SARS-COV-2 IGG SERPL QL IA: POSITIVE
SODIUM SERPL-SCNC: 138 MMOL/L
SPECIFIC GRAVITY URINE: 1.02
SQUAMOUS EPITHELIAL CELLS: 0 /HPF
TRIGL SERPL-MCNC: 44 MG/DL
TSH SERPL-ACNC: 1.11 UIU/ML
UROBILINOGEN URINE: NORMAL
VIT B12 SERPL-MCNC: 363 PG/ML
WBC # FLD AUTO: 3.46 K/UL
WHITE BLOOD CELLS URINE: 0 /HPF

## 2020-05-26 ENCOUNTER — RESULT CHARGE (OUTPATIENT)
Age: 55
End: 2020-05-26

## 2020-05-26 ENCOUNTER — APPOINTMENT (OUTPATIENT)
Dept: INTERNAL MEDICINE | Facility: CLINIC | Age: 55
End: 2020-05-26
Payer: COMMERCIAL

## 2020-05-26 ENCOUNTER — LABORATORY RESULT (OUTPATIENT)
Age: 55
End: 2020-05-26

## 2020-05-26 VITALS
HEART RATE: 63 BPM | RESPIRATION RATE: 12 BRPM | WEIGHT: 186 LBS | HEIGHT: 71 IN | TEMPERATURE: 98.4 F | OXYGEN SATURATION: 98 % | BODY MASS INDEX: 26.04 KG/M2 | SYSTOLIC BLOOD PRESSURE: 172 MMHG | DIASTOLIC BLOOD PRESSURE: 80 MMHG

## 2020-05-26 VITALS — DIASTOLIC BLOOD PRESSURE: 70 MMHG | SYSTOLIC BLOOD PRESSURE: 118 MMHG

## 2020-05-26 PROCEDURE — 99214 OFFICE O/P EST MOD 30 MIN: CPT

## 2020-05-26 PROCEDURE — 85610 PROTHROMBIN TIME: CPT | Mod: QW

## 2020-05-26 NOTE — PHYSICAL EXAM
[No Acute Distress] : no acute distress [Well Nourished] : well nourished [Well Developed] : well developed [Normal Sclera/Conjunctiva] : normal sclera/conjunctiva [Well-Appearing] : well-appearing [Supple] : supple [Normal Outer Ear/Nose] : the outer ears and nose were normal in appearance [Normal Oropharynx] : the oropharynx was normal [No Accessory Muscle Use] : no accessory muscle use [No Respiratory Distress] : no respiratory distress  [Soft] : abdomen soft [No Edema] : there was no peripheral edema [No Rash] : no rash [No Focal Deficits] : no focal deficits [Alert and Oriented x3] : oriented to person, place, and time [de-identified] : OBSERVATIONAL

## 2020-05-26 NOTE — REVIEW OF SYSTEMS
[Impotence] : impotence [Anxiety] : anxiety [Poor Libido] : poor libido [Negative] : Heme/Lymph [Dysuria] : no dysuria [Incontinence] : no incontinence [Hesitancy] : no hesitancy [Nocturia] : no nocturia [Hematuria] : no hematuria [Frequency] : no frequency

## 2020-06-18 PROBLEM — U07.1 COVID-19: Status: ACTIVE | Noted: 2020-05-19

## 2020-07-07 ENCOUNTER — RESULT CHARGE (OUTPATIENT)
Age: 55
End: 2020-07-07

## 2020-07-08 LAB
INR PPP: 2.2 RATIO
POCT-PROTHROMBIN TIME: 26.3 SECS

## 2020-07-28 ENCOUNTER — APPOINTMENT (OUTPATIENT)
Dept: VASCULAR SURGERY | Facility: CLINIC | Age: 55
End: 2020-07-28
Payer: COMMERCIAL

## 2020-07-28 PROCEDURE — 93880 EXTRACRANIAL BILAT STUDY: CPT

## 2020-08-04 ENCOUNTER — APPOINTMENT (OUTPATIENT)
Dept: NEUROSURGERY | Facility: CLINIC | Age: 55
End: 2020-08-04
Payer: COMMERCIAL

## 2020-08-04 PROCEDURE — 99214 OFFICE O/P EST MOD 30 MIN: CPT | Mod: 95

## 2020-08-05 NOTE — HISTORY OF PRESENT ILLNESS
[Home] : at home, [unfilled] , at the time of the visit. [Medical Office: ([facility])___] : at the medical office located in  [Verbal consent obtained from patient] : the patient, [unfilled] [Spouse] : spouse [FreeTextEntry1] : \par Right-handed 55M who was taken to [facility] after he developed a sudden onset of right hemiparesis and global aphasia while at work. He was treated with IV tPA and was transferred to [facility] for further management. A CTA on arrival suggested occlusion of the distal M1 segment of the left MCA and the CT didn’t show hemorrhage or large completed infarction. He was diagnosed with acute left MCA CVA \par \par 4/5/18: s/p FEMORAL CEREBRAL ANGIOGRAM AND MECHANICAL THROMBECTOMY WITH PENUMBRA AND SOLITAIRE \par \par Noted to have L atrial appendage thrombus on BRANDON and positive anticardiolipin antibodies and MTHFR. Very strong family hx of strokes. Patient was discharged from [facility] on 4/30/18 to acute rehab on Warfarin 7.5mg QD and Lovenox 90mg BID. Repeat BRANDON 9/17/2018 showed resolution of left atrial appendage thrombus. \par \par \par \par Handedness: RIGHT\par \par Patient Address:\par [address] [facility]\[place] [address]\par \par Neurologist:\par Dr. Sarai Mcfadden\par [address]\[place] [address]\par \par PCP:\par Dr.John Oliver\par [facility] [place] \par [address]\[place] [address] \par Phone: [phone] \par \par Cardiologist:\par Dr. Mo Ding\par [address]\[place] [address]\par \par

## 2020-08-05 NOTE — ASSESSMENT
[FreeTextEntry1] : A recent carotid duplex didn’t show significant carotid stenosis.  He will continue warfarin and will follow up with us in 1 year when we will obtain a repeat carotid duplex. \par \par Plan:\par 1. Repeat US carotid duplex in 1 year and RTO to review it.\par 2. Follow up with Dr. Ramirez in managing Coumadin.\par

## 2020-08-05 NOTE — DATA REVIEWED
[de-identified] : US carotid duplex from 7/28/2020:No evidence of significant plaque in bilateral ICAs.

## 2020-08-05 NOTE — REASON FOR VISIT
[Follow-Up: _____] : a [unfilled] follow-up visit [FreeTextEntry1] : LAST VISIT on 4/18/19:\par He has residual symptoms of right arm weakness, mild dysarthria. He is participating in outpatient OT and PT 2x weekly. No more speech therapy. He participated in a race, his speech and language are improved with rare word finding difficulty. He has also started swimming and bikes 22 miles.\par \par He remains on Coumadin (12mg M-W-F-Su; 9mg T-Th-Sa) managed by Dr. Miles. He is off statin. He presents today to review US carotid duplex. \par \par TODAY'S VISIT:\par He is doing well. He is biking and exercising 5x day. He still complains of some RIGHT shoulder weakness but is drastically improved, per patient.\par On Warfarin 9mg on Tues, Thurs and Sat, 12 mg  on Sun, M, W, F. managed by Dr. Oliver.\par Contracted COVID-19

## 2020-08-05 NOTE — ADDENDUM
[FreeTextEntry1] : 2020\par \par Sarai Mcfadden MD\par Director, Stroke\par [facility]\par [address]\[place] [address]\par \par Patient’s Name: Andrea Valero\par : 1965\par \par Dear Dr. Mcfadden:\par \par We saw Mr. Valero in tele health consultation at [facility]. As you know, he is a 55 years-old right handed man who suffered an acute ischemic stroke in 2018. At that time he was treated with systemic thrombolysis at [facility] and transferred to [facility] where he underwent\par mechanical thrombectomy and complete recanalization of an occluded left middle cerebral artery. Work up for secondary stroke prevention revealed anticardiolipin antibodies and MTHFR mutation. He was started on anticoagulation with warfarin and has been doing very well since then. He works out on daily basis.  Even though he had COVID-19 he feels great. A recent carotid duplex didn’t show significant carotid stenosis.  He will continue warfarin and will follow up with us in 1 year when we will obtain a repeat carotid duplex.\par \par Thank you for allowing us to participate in Mr. Valero’s care. I will keep you\par updated at all times.\par \par \par Sincerely,\par \par \par \par Richard Mota MD\par Chief\par Neuro-Endovascular Surgery and\par Interventional Neuroradiology\par [facility]\par [facility]\par [address]\par [address] Floor\[place] [address]\par T: [phone]\par F: [phone]\par \par \par cc: \par Blaze Oliver MD\par [address]\[place] [address]\par \par Mo Ding MD\par [address]\[place] [address]\par \par Mr. Andrea Valero\par [address]\[place] [address]	\par 	\par \par

## 2020-08-05 NOTE — PHYSICAL EXAM
[General Appearance - Alert] : alert [General Appearance - In No Acute Distress] : in no acute distress [Oriented To Time, Place, And Person] : oriented to person, place, and time [Impaired Insight] : insight and judgment were intact [Affect] : the affect was normal [Person] : oriented to person [Place] : oriented to place [Short Term Intact] : short term memory intact [Time] : oriented to time [Remote Intact] : remote memory intact [Registration Intact] : recent registration memory intact [Neck Appearance] : the appearance of the neck was normal [] : no respiratory distress [Respiration, Rhythm And Depth] : normal respiratory rhythm and effort [Skin Color & Pigmentation] : normal skin color and pigmentation [FreeTextEntry5] : mild right facial asymmetry, 5/5 on all four extremities.

## 2020-08-07 ENCOUNTER — RESULT CHARGE (OUTPATIENT)
Age: 55
End: 2020-08-07

## 2020-08-10 LAB
INR PPP: 1.6 RATIO
POCT-PROTHROMBIN TIME: 19.2 SECS

## 2020-08-23 ENCOUNTER — RESULT CHARGE (OUTPATIENT)
Age: 55
End: 2020-08-23

## 2020-08-25 NOTE — REVIEW OF SYSTEMS
RN reviewed chart and called patient mother Delfina Donahue at [phone]  RN left a voice message and provided name, role and contact information   Poly Hargrove Rn following up on outpatient rehab and kids in foster care  RN also called children and youth  Tommy Hogan at [phone] and left a voice message and requested return call with updates on case  Kids appointments are as follows        1 Naz Bah a well visit on 7/23/20 follow up in one year   Needs dental      2 Kim gaitan needs a well visit  Missed appointments   Needs dental      3 Sanjeev Kay well check    Needs  Audiology appointment   Needs early intervention      [address]   Seen well visit on 7/23/20 needs one month follow up scheduled for 8/24/20  Pt attended appointment        [facility] hematology appointment on 9/18/20      Dr Aj Palumbo 9/28/20 11 am      Dental appointmet in august not sure where       RN will continue to follow and out reach in a few months due to kids on foster care  Poly Hargrove [As Noted in HPI] : as noted in HPI

## 2020-09-10 ENCOUNTER — RESULT CHARGE (OUTPATIENT)
Age: 55
End: 2020-09-10

## 2020-09-11 LAB
INR PPP: 1.7 RATIO
INR PPP: 1.8 RATIO
POCT-PROTHROMBIN TIME: 20.7 SECS
POCT-PROTHROMBIN TIME: 21.3 SECS

## 2020-09-17 ENCOUNTER — RESULT CHARGE (OUTPATIENT)
Age: 55
End: 2020-09-17

## 2020-09-18 LAB
INR PPP: 1.8 RATIO
POCT-PROTHROMBIN TIME: 21.3 SECS

## 2020-09-23 ENCOUNTER — RESULT CHARGE (OUTPATIENT)
Age: 55
End: 2020-09-23

## 2020-09-25 LAB
INR PPP: 2.1 RATIO
POCT-PROTHROMBIN TIME: 25.5 SECS

## 2020-10-05 ENCOUNTER — RESULT CHARGE (OUTPATIENT)
Age: 55
End: 2020-10-05

## 2020-10-05 LAB
INR PPP: 1.9 RATIO
POCT-PROTHROMBIN TIME: 22.5 SECS

## 2020-10-19 ENCOUNTER — RESULT CHARGE (OUTPATIENT)
Age: 55
End: 2020-10-19

## 2020-10-20 LAB
INR PPP: 2.3 RATIO
POCT-PROTHROMBIN TIME: 27.4 SECS

## 2020-11-11 ENCOUNTER — TRANSCRIPTION ENCOUNTER (OUTPATIENT)
Age: 55
End: 2020-11-11

## 2020-11-11 ENCOUNTER — APPOINTMENT (OUTPATIENT)
Dept: INTERNAL MEDICINE | Facility: CLINIC | Age: 55
End: 2020-11-11
Payer: COMMERCIAL

## 2020-11-11 ENCOUNTER — NON-APPOINTMENT (OUTPATIENT)
Age: 55
End: 2020-11-11

## 2020-11-11 VITALS
SYSTOLIC BLOOD PRESSURE: 136 MMHG | RESPIRATION RATE: 12 BRPM | WEIGHT: 190 LBS | BODY MASS INDEX: 26.6 KG/M2 | OXYGEN SATURATION: 97 % | HEIGHT: 71 IN | DIASTOLIC BLOOD PRESSURE: 82 MMHG | TEMPERATURE: 97.16 F | HEART RATE: 51 BPM

## 2020-11-11 VITALS — SYSTOLIC BLOOD PRESSURE: 128 MMHG | DIASTOLIC BLOOD PRESSURE: 80 MMHG

## 2020-11-11 DIAGNOSIS — R79.89 OTHER SPECIFIED ABNORMAL FINDINGS OF BLOOD CHEMISTRY: ICD-10-CM

## 2020-11-11 DIAGNOSIS — Z23 ENCOUNTER FOR IMMUNIZATION: ICD-10-CM

## 2020-11-11 DIAGNOSIS — U07.1 COVID-19: ICD-10-CM

## 2020-11-11 PROCEDURE — 99396 PREV VISIT EST AGE 40-64: CPT | Mod: 25

## 2020-11-11 PROCEDURE — 99072 ADDL SUPL MATRL&STAF TM PHE: CPT

## 2020-11-11 PROCEDURE — 85610 PROTHROMBIN TIME: CPT | Mod: QW

## 2020-11-11 PROCEDURE — 90471 IMMUNIZATION ADMIN: CPT

## 2020-11-11 PROCEDURE — 90686 IIV4 VACC NO PRSV 0.5 ML IM: CPT

## 2020-11-11 PROCEDURE — 93000 ELECTROCARDIOGRAM COMPLETE: CPT

## 2020-11-11 RX ORDER — ASPIRIN 81 MG/1
81 TABLET ORAL
Refills: 0 | Status: ACTIVE | COMMUNITY

## 2020-11-11 NOTE — PHYSICAL EXAM
[No Acute Distress] : no acute distress [Well Nourished] : well nourished [Well Developed] : well developed [Well-Appearing] : well-appearing [Normal] : no acute distress, well nourished, well developed and well-appearing [Normal Sclera/Conjunctiva] : normal sclera/conjunctiva [PERRL] : pupils equal round and reactive to light [Normal Outer Ear/Nose] : the outer ears and nose were normal in appearance [Normal Oropharynx] : the oropharynx was normal [Normal TMs] : both tympanic membranes were normal [Supple] : supple [No Respiratory Distress] : no respiratory distress  [No Accessory Muscle Use] : no accessory muscle use [Clear to Auscultation] : lungs were clear to auscultation bilaterally [Normal Rate] : normal rate  [Regular Rhythm] : with a regular rhythm [Normal S1, S2] : normal S1 and S2 [No Murmur] : no murmur heard [No Carotid Bruits] : no carotid bruits [No Edema] : there was no peripheral edema [Soft] : abdomen soft [Non Tender] : non-tender [Non-distended] : non-distended [No HSM] : no HSM [Normal Bowel Sounds] : normal bowel sounds [No Rash] : no rash [Coordination Grossly Intact] : coordination grossly intact [No Focal Deficits] : no focal deficits [Normal Gait] : normal gait [Normal Affect] : the affect was normal [Alert and Oriented x3] : oriented to person, place, and time [Normal Insight/Judgement] : insight and judgment were intact

## 2020-11-15 LAB
25(OH)D3 SERPL-MCNC: 46.3 NG/ML
ALBUMIN SERPL ELPH-MCNC: 4.4 G/DL
ALP BLD-CCNC: 52 U/L
ALT SERPL-CCNC: 27 U/L
ANION GAP SERPL CALC-SCNC: 13 MMOL/L
APPEARANCE: CLEAR
AST SERPL-CCNC: 24 U/L
BASOPHILS # BLD AUTO: 0.02 K/UL
BASOPHILS NFR BLD AUTO: 0.6 %
BILIRUB SERPL-MCNC: 0.7 MG/DL
BILIRUBIN URINE: NEGATIVE
BLOOD URINE: NORMAL
BUN SERPL-MCNC: 17 MG/DL
CALCIUM SERPL-MCNC: 9.6 MG/DL
CHLORIDE SERPL-SCNC: 105 MMOL/L
CHOLEST SERPL-MCNC: 141 MG/DL
CO2 SERPL-SCNC: 24 MMOL/L
COLOR: YELLOW
CREAT SERPL-MCNC: 1.38 MG/DL
CREAT SPEC-SCNC: 176 MG/DL
EOSINOPHIL # BLD AUTO: 0.14 K/UL
EOSINOPHIL NFR BLD AUTO: 4.2 %
ESTIMATED AVERAGE GLUCOSE: 111 MG/DL
FERRITIN SERPL-MCNC: 354 NG/ML
FOLATE SERPL-MCNC: 14.6 NG/ML
GLUCOSE QUALITATIVE U: NEGATIVE
GLUCOSE SERPL-MCNC: 84 MG/DL
GLUCOSE SERPL-MCNC: 86 MG/DL
HBA1C MFR BLD HPLC: 5.5 %
HCT VFR BLD CALC: 45.4 %
HDLC SERPL-MCNC: 89 MG/DL
HGB BLD-MCNC: 14.4 G/DL
IMM GRANULOCYTES NFR BLD AUTO: 0.3 %
IRON SERPL-MCNC: 97 UG/DL
KETONES URINE: NEGATIVE
LDLC SERPL CALC-MCNC: 41 MG/DL
LEUKOCYTE ESTERASE URINE: NEGATIVE
LYMPHOCYTES # BLD AUTO: 1.4 K/UL
LYMPHOCYTES NFR BLD AUTO: 41.8 %
MAN DIFF?: NORMAL
MCHC RBC-ENTMCNC: 29.7 PG
MCHC RBC-ENTMCNC: 31.7 GM/DL
MCV RBC AUTO: 93.6 FL
MICROALBUMIN 24H UR DL<=1MG/L-MCNC: <1.2 MG/DL
MICROALBUMIN/CREAT 24H UR-RTO: NORMAL MG/G
MONOCYTES # BLD AUTO: 0.35 K/UL
MONOCYTES NFR BLD AUTO: 10.4 %
NEUTROPHILS # BLD AUTO: 1.43 K/UL
NEUTROPHILS NFR BLD AUTO: 42.7 %
NITRITE URINE: NEGATIVE
NONHDLC SERPL-MCNC: 52 MG/DL
PH URINE: 6
PLATELET # BLD AUTO: 205 K/UL
POTASSIUM SERPL-SCNC: 4.9 MMOL/L
PROT SERPL-MCNC: 7.1 G/DL
PROTEIN URINE: NORMAL
PSA FREE FLD-MCNC: 25 %
PSA FREE SERPL-MCNC: 0.24 NG/ML
PSA SERPL-MCNC: 0.97 NG/ML
RBC # BLD: 4.85 M/UL
RBC # FLD: 14.6 %
SARS-COV-2 IGG SERPL IA-ACNC: 197 INDEX
SARS-COV-2 IGG SERPL QL IA: POSITIVE
SODIUM SERPL-SCNC: 143 MMOL/L
SPECIFIC GRAVITY URINE: >=1.03
TRIGL SERPL-MCNC: 55 MG/DL
TSH SERPL-ACNC: 0.87 UIU/ML
UROBILINOGEN URINE: NORMAL
VIT B12 SERPL-MCNC: 452 PG/ML
WBC # FLD AUTO: 3.35 K/UL

## 2020-12-30 ENCOUNTER — RESULT CHARGE (OUTPATIENT)
Age: 55
End: 2020-12-30

## 2020-12-30 LAB
INR PPP: 1.7 RATIO
POCT-PROTHROMBIN TIME: 20.7 SECS

## 2021-02-10 ENCOUNTER — RESULT CHARGE (OUTPATIENT)
Age: 56
End: 2021-02-10

## 2021-02-10 LAB
INR PPP: 2.1 RATIO
POCT-PROTHROMBIN TIME: 25.5 SECS

## 2021-03-02 NOTE — PROGRESS NOTE ADULT - PROVIDER SPECIALTY LIST ADULT
Heme/Onc LOW RESIDUE DIET    FOOD GROUPS FOODS ALLOWED:    BREADS AND CEREALS:  -Refined breads (1 - 2 slices white bread), cereals, crackers, chips, pasta (no whole grain), and grain products Check label for fiber content. Less than 0.5 gram fiber per serving is allowed  -White rice (1 cup)  -Skinless baked potato    VEGETABLES:  -Vegetable juices only - no seeds or pulp  -Tender well cooked vegetables with no seeds, hulls or fibrous skins (half cup serving)  -No beans or peas, tomato sauces or paste    FRUITS:  -Fruit juices only - no pulp  -Canned or well-cooked fruit with no seeds or fibrous skins (fiber content less than1.0 grams per half cup serving)  -Half cup applesauce    MILK PRODUCTS:  -No more than 2 cups daily of milk, yogurt,  -Pudding, ice cream, cheeses.   -Yogurt - no seeds, nuts or pieces of fruit    MEAT AND MEAT SUBSTITUTES:  -Tender meat, poultry, fish and eggs - 3 ounce serving, skinless  -1 large or 2 medium size eggs    FATS, OILS:  -Oil, margarine, butter, mayonnaise, gravy (count toward milk allowance), salad dressing (smooth), and cream sauce    MISCELLANEOUS:  -Broth-based soups (may have noodles but no vegetables), cream-based soups (without vegetables, count toward milk allowance)  -Plain candy (jelly beans, hard candies, mints)  -Jelly, honey, syrup

## 2021-03-05 ENCOUNTER — NON-APPOINTMENT (OUTPATIENT)
Age: 56
End: 2021-03-05

## 2021-03-06 ENCOUNTER — TRANSCRIPTION ENCOUNTER (OUTPATIENT)
Age: 56
End: 2021-03-06

## 2021-03-12 ENCOUNTER — RESULT CHARGE (OUTPATIENT)
Age: 56
End: 2021-03-12

## 2021-04-22 ENCOUNTER — RESULT CHARGE (OUTPATIENT)
Age: 56
End: 2021-04-22

## 2021-04-22 LAB
INR PPP: 2.1 RATIO
POCT-PROTHROMBIN TIME: 25.4 SECS

## 2021-04-23 LAB
INR PPP: 1.9 RATIO
POCT-PROTHROMBIN TIME: 22.5 SECS

## 2021-05-06 ENCOUNTER — APPOINTMENT (OUTPATIENT)
Dept: INTERNAL MEDICINE | Facility: CLINIC | Age: 56
End: 2021-05-06
Payer: COMMERCIAL

## 2021-05-06 VITALS
HEIGHT: 71 IN | HEART RATE: 54 BPM | SYSTOLIC BLOOD PRESSURE: 148 MMHG | OXYGEN SATURATION: 100 % | DIASTOLIC BLOOD PRESSURE: 79 MMHG | WEIGHT: 180 LBS | RESPIRATION RATE: 12 BRPM | TEMPERATURE: 207.14 F | BODY MASS INDEX: 25.2 KG/M2

## 2021-05-06 VITALS — DIASTOLIC BLOOD PRESSURE: 80 MMHG | SYSTOLIC BLOOD PRESSURE: 130 MMHG

## 2021-05-06 PROCEDURE — 99072 ADDL SUPL MATRL&STAF TM PHE: CPT

## 2021-05-06 PROCEDURE — 85610 PROTHROMBIN TIME: CPT | Mod: QW

## 2021-05-06 PROCEDURE — 99214 OFFICE O/P EST MOD 30 MIN: CPT

## 2021-05-06 NOTE — PHYSICAL EXAM
[Well Nourished] : well nourished [Well Developed] : well developed [Well-Appearing] : well-appearing [Normal] : no acute distress, well nourished, well developed and well-appearing [Normal Sclera/Conjunctiva] : normal sclera/conjunctiva [PERRL] : pupils equal round and reactive to light [Normal Outer Ear/Nose] : the outer ears and nose were normal in appearance [Normal Oropharynx] : the oropharynx was normal [Normal TMs] : both tympanic membranes were normal [Supple] : supple [No Respiratory Distress] : no respiratory distress  [No Accessory Muscle Use] : no accessory muscle use [Clear to Auscultation] : lungs were clear to auscultation bilaterally [Normal Rate] : normal rate  [Regular Rhythm] : with a regular rhythm [Normal S1, S2] : normal S1 and S2 [No Murmur] : no murmur heard [No Carotid Bruits] : no carotid bruits [No Edema] : there was no peripheral edema [Soft] : abdomen soft [Non Tender] : non-tender [Non-distended] : non-distended [No HSM] : no HSM [Normal Bowel Sounds] : normal bowel sounds [No Rash] : no rash [Coordination Grossly Intact] : coordination grossly intact [No Focal Deficits] : no focal deficits [Normal Gait] : normal gait [Normal Affect] : the affect was normal [Alert and Oriented x3] : oriented to person, place, and time [Normal Insight/Judgement] : insight and judgment were intact

## 2021-05-27 ENCOUNTER — RESULT CHARGE (OUTPATIENT)
Age: 56
End: 2021-05-27

## 2021-05-28 LAB
INR PPP: 1.8 RATIO
POCT-PROTHROMBIN TIME: 21.7 SECS

## 2021-06-16 ENCOUNTER — RESULT CHARGE (OUTPATIENT)
Age: 56
End: 2021-06-16

## 2021-06-18 LAB
INR PPP: 2.5 RATIO
POCT-PROTHROMBIN TIME: 29.5 SECS

## 2021-06-21 LAB
25(OH)D3 SERPL-MCNC: 55.1 NG/ML
ALBUMIN SERPL ELPH-MCNC: 4.2 G/DL
ALP BLD-CCNC: 75 U/L
ALT SERPL-CCNC: 19 U/L
ANION GAP SERPL CALC-SCNC: 11 MMOL/L
APPEARANCE: CLEAR
AST SERPL-CCNC: 21 U/L
BASOPHILS # BLD AUTO: 0.03 K/UL
BASOPHILS NFR BLD AUTO: 0.8 %
BILIRUB SERPL-MCNC: 0.5 MG/DL
BILIRUBIN URINE: NEGATIVE
BLOOD URINE: NEGATIVE
BUN SERPL-MCNC: 15 MG/DL
CALCIUM SERPL-MCNC: 9.3 MG/DL
CHLORIDE SERPL-SCNC: 104 MMOL/L
CHOLEST SERPL-MCNC: 130 MG/DL
CO2 SERPL-SCNC: 24 MMOL/L
COLOR: YELLOW
CREAT SERPL-MCNC: 1.42 MG/DL
CREAT SPEC-SCNC: 126 MG/DL
EOSINOPHIL # BLD AUTO: 0.22 K/UL
EOSINOPHIL NFR BLD AUTO: 6 %
ESTIMATED AVERAGE GLUCOSE: 111 MG/DL
FERRITIN SERPL-MCNC: 306 NG/ML
FOLATE SERPL-MCNC: 13.9 NG/ML
GLUCOSE QUALITATIVE U: NEGATIVE
GLUCOSE SERPL-MCNC: 85 MG/DL
GLUCOSE SERPL-MCNC: 92 MG/DL
HBA1C MFR BLD HPLC: 5.5 %
HCT VFR BLD CALC: 43.3 %
HDLC SERPL-MCNC: 94 MG/DL
HGB BLD-MCNC: 13.7 G/DL
IMM GRANULOCYTES NFR BLD AUTO: 0 %
IRON SERPL-MCNC: 57 UG/DL
KETONES URINE: NEGATIVE
LDLC SERPL CALC-MCNC: 27 MG/DL
LEUKOCYTE ESTERASE URINE: NEGATIVE
LYMPHOCYTES # BLD AUTO: 1.4 K/UL
LYMPHOCYTES NFR BLD AUTO: 37.9 %
MAN DIFF?: NORMAL
MCHC RBC-ENTMCNC: 28.8 PG
MCHC RBC-ENTMCNC: 31.6 GM/DL
MCV RBC AUTO: 91 FL
MICROALBUMIN 24H UR DL<=1MG/L-MCNC: <1.2 MG/DL
MICROALBUMIN/CREAT 24H UR-RTO: NORMAL MG/G
MONOCYTES # BLD AUTO: 0.45 K/UL
MONOCYTES NFR BLD AUTO: 12.2 %
NEUTROPHILS # BLD AUTO: 1.59 K/UL
NEUTROPHILS NFR BLD AUTO: 43.1 %
NITRITE URINE: NEGATIVE
NONHDLC SERPL-MCNC: 36 MG/DL
PH URINE: 7.5
PLATELET # BLD AUTO: 219 K/UL
POTASSIUM SERPL-SCNC: 4.8 MMOL/L
PROT SERPL-MCNC: 7.1 G/DL
PROTEIN URINE: NEGATIVE
PSA FREE FLD-MCNC: 20 %
PSA FREE SERPL-MCNC: 0.46 NG/ML
PSA SERPL-MCNC: 2.3 NG/ML
RBC # BLD: 4.76 M/UL
RBC # FLD: 13.9 %
SODIUM SERPL-SCNC: 140 MMOL/L
SPECIFIC GRAVITY URINE: 1.02
TRIGL SERPL-MCNC: 45 MG/DL
TSH SERPL-ACNC: 1.83 UIU/ML
UROBILINOGEN URINE: ABNORMAL
VIT B12 SERPL-MCNC: 508 PG/ML
WBC # FLD AUTO: 3.69 K/UL

## 2021-07-06 ENCOUNTER — RESULT CHARGE (OUTPATIENT)
Age: 56
End: 2021-07-06

## 2021-07-06 LAB
INR PPP: 2.5 RATIO
POCT-PROTHROMBIN TIME: 29.5 SECS

## 2021-07-19 ENCOUNTER — APPOINTMENT (OUTPATIENT)
Dept: UROLOGY | Facility: CLINIC | Age: 56
End: 2021-07-19
Payer: COMMERCIAL

## 2021-07-19 VITALS
RESPIRATION RATE: 12 BRPM | TEMPERATURE: 206.78 F | DIASTOLIC BLOOD PRESSURE: 69 MMHG | HEART RATE: 61 BPM | BODY MASS INDEX: 25.2 KG/M2 | WEIGHT: 180 LBS | OXYGEN SATURATION: 97 % | HEIGHT: 71 IN | SYSTOLIC BLOOD PRESSURE: 116 MMHG

## 2021-07-19 PROCEDURE — 99214 OFFICE O/P EST MOD 30 MIN: CPT

## 2021-07-19 PROCEDURE — 99072 ADDL SUPL MATRL&STAF TM PHE: CPT

## 2021-07-19 NOTE — PHYSICAL EXAM
Unable to reach pt - left message to call clinic back. Would like to relay message below from MD.    [General Appearance - Well Developed] : well developed [General Appearance - Well Nourished] : well nourished [Normal Appearance] : normal appearance [Well Groomed] : well groomed [General Appearance - In No Acute Distress] : no acute distress [Abdomen Soft] : soft [Abdomen Tenderness] : non-tender [Costovertebral Angle Tenderness] : no ~M costovertebral angle tenderness [Urethral Meatus] : meatus normal [Urinary Bladder Findings] : the bladder was normal on palpation [Scrotum] : the scrotum was normal [Testes Mass (___cm)] : there were no testicular masses [Edema] : no peripheral edema [] : no respiratory distress [Respiration, Rhythm And Depth] : normal respiratory rhythm and effort [Exaggerated Use Of Accessory Muscles For Inspiration] : no accessory muscle use [Oriented To Time, Place, And Person] : oriented to person, place, and time [Affect] : the affect was normal [Mood] : the mood was normal [Not Anxious] : not anxious [Normal Station and Gait] : the gait and station were normal for the patient's age [No Focal Deficits] : no focal deficits [No Palpable Adenopathy] : no palpable adenopathy

## 2021-07-19 NOTE — ASSESSMENT
[FreeTextEntry1] : Very pleasant 55-year-old gentleman who presents for follow-up of bladder wall thickening, elevated creatinine, rising PSA, microscopic hematuria\par -I again recommended that he undergo a cystoscopy for hematuria and bladder wall thickening, however he is not interested in doing so at this time\par -The risks of urothelial malignancy as these risks the recommendation to undergo a cystoscopy valuation\par -PSA reviewed–2.3; repeat\par -Creatinine reviewed–1.43\par -Most recent urinalysis reviewed demonstrating no evidence of microscopic hematuria\par -Renal ultrasound and follow-up in approximately 3 weeks after ultrasound

## 2021-07-19 NOTE — HISTORY OF PRESENT ILLNESS
[FreeTextEntry1] : Very pleasant 55-year-old gentleman who presents for follow-up of microscopic hematuria, bladder wall thickening, elevated creatinine, new finding of rising PSA.  He was previously evaluated for an elevated creatinine in the setting of microscopic hematuria and bladder wall thickening.  At that time he was recommended to undergo a cystoscopy, however he chose not to do so.  He is still not interested in completing cystoscopy for evaluation of bladder wall thickening and microscopic hematuria at this time.  Recent PSA arjun to 2.3 from a baseline of less than 1.  Creatinine was noted to be 1.43, consistent with his baseline of approximately 1.4 or higher.  He feels well.  He reports that he is urinating well.  No other complaints.

## 2021-07-20 LAB — PSA SERPL-MCNC: 1.13 NG/ML

## 2021-08-09 ENCOUNTER — APPOINTMENT (OUTPATIENT)
Dept: UROLOGY | Facility: CLINIC | Age: 56
End: 2021-08-09
Payer: COMMERCIAL

## 2021-08-09 ENCOUNTER — RESULT CHARGE (OUTPATIENT)
Age: 56
End: 2021-08-09

## 2021-08-09 VITALS
SYSTOLIC BLOOD PRESSURE: 137 MMHG | HEIGHT: 71 IN | BODY MASS INDEX: 25.62 KG/M2 | DIASTOLIC BLOOD PRESSURE: 82 MMHG | RESPIRATION RATE: 12 BRPM | OXYGEN SATURATION: 98 % | WEIGHT: 183 LBS | TEMPERATURE: 207.14 F | HEART RATE: 52 BPM

## 2021-08-09 PROCEDURE — 99214 OFFICE O/P EST MOD 30 MIN: CPT

## 2021-08-09 NOTE — HISTORY OF PRESENT ILLNESS
[FreeTextEntry1] : Very pleasant 56-year-old gentleman who presents for follow-up of microscopic hematuria, bladder wall thickening, elevated creatinine, new finding of rising PSA, renal cyst.  He was previously evaluated for an elevated creatinine in the setting of microscopic hematuria and bladder wall thickening.  At that time he was recommended to undergo a cystoscopy, however he chose not to do so.  He is still not interested in completing cystoscopy for evaluation of bladder wall thickening and microscopic hematuria at this time.  Recent PSA arjun to 2.3 from a baseline of less than 1.  This was rechecked and was found to be 1.1.  Creatinine was noted to be 1.43, consistent with his baseline of approximately 1.4 or higher.  He feels well.  He reports that he is urinating well.  No other complaints.\par \par Recent renal ultrasound demonstrated a 1.3 cm simple left renal cyst.

## 2021-08-09 NOTE — ASSESSMENT
[FreeTextEntry1] : Very pleasant 56-year-old gentleman presents for follow-up of BPH, bladder wall thickening, microscopic hematuria, renal cyst\par -PSA reviewed–1.1\par -Renal ultrasound images from  radiology reviewed demonstrating 1.3 cm simple left renal cyst; PVR 8 cc\par -Urinalysis reviewed–negative nitrites, negative leukocyte Estrace, negative blood\par -CMP reviewed–creatinine 1.42\par -Follow-up in 1 year with repeat PSA\par -We discussed the benign nature of simple renal cysts

## 2021-08-10 LAB
INR PPP: 4.5 RATIO
INR PPP: 4.74 RATIO
POCT-PROTHROMBIN TIME: 54.4 SECS
PT BLD: 51.9 SEC

## 2021-08-24 ENCOUNTER — RESULT CHARGE (OUTPATIENT)
Age: 56
End: 2021-08-24

## 2021-08-31 LAB
INR PPP: 2.5 RATIO
POCT-PROTHROMBIN TIME: 29.6 SECS

## 2021-09-27 ENCOUNTER — EMERGENCY (EMERGENCY)
Facility: HOSPITAL | Age: 56
LOS: 0 days | Discharge: HOME | End: 2021-09-27
Payer: COMMERCIAL

## 2021-09-27 VITALS
RESPIRATION RATE: 18 BRPM | OXYGEN SATURATION: 99 % | TEMPERATURE: 98 F | HEIGHT: 71 IN | WEIGHT: 179.9 LBS | SYSTOLIC BLOOD PRESSURE: 160 MMHG | HEART RATE: 55 BPM | DIASTOLIC BLOOD PRESSURE: 93 MMHG

## 2021-09-27 DIAGNOSIS — Z79.82 LONG TERM (CURRENT) USE OF ASPIRIN: ICD-10-CM
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
## 2021-09-27 DIAGNOSIS — S61.216A LACERATION WITHOUT FOREIGN BODY OF RIGHT LITTLE FINGER WITHOUT DAMAGE TO NAIL, INITIAL ENCOUNTER: ICD-10-CM

## 2021-09-27 DIAGNOSIS — S61.411A LACERATION WITHOUT FOREIGN BODY OF RIGHT HAND, INITIAL ENCOUNTER: ICD-10-CM

## 2021-09-27 DIAGNOSIS — I69.331 MONOPLEGIA OF UPPER LIMB FOLLOWING CEREBRAL INFARCTION AFFECTING RIGHT DOMINANT SIDE: ICD-10-CM

## 2021-09-27 DIAGNOSIS — Z86.79 PERSONAL HISTORY OF OTHER DISEASES OF THE CIRCULATORY SYSTEM: ICD-10-CM

## 2021-09-27 DIAGNOSIS — Z79.01 LONG TERM (CURRENT) USE OF ANTICOAGULANTS: ICD-10-CM

## 2021-09-27 DIAGNOSIS — S61.210A LACERATION WITHOUT FOREIGN BODY OF RIGHT INDEX FINGER WITHOUT DAMAGE TO NAIL, INITIAL ENCOUNTER: ICD-10-CM
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
## 2021-09-27 PROCEDURE — 99283 EMERGENCY DEPT VISIT LOW MDM: CPT | Mod: 25

## 2021-09-27 PROCEDURE — 12001 RPR S/N/AX/GEN/TRNK 2.5CM/<: CPT

## 2021-09-27 RX ADMIN — Medication 1 TABLET(S): at 22:12

## 2021-09-27 NOTE — ED ADULT NURSE NOTE - NSICDXPASTMEDICALHX_GEN_ALL_CORE_FT
PAST MEDICAL HISTORY:  Cerebrovascular accident (CVA) due to occlusion of left middle cerebral artery Left M2 occlusion    Irregular heart beat

## 2021-09-27 NOTE — ED ADULT NURSE NOTE - OBJECTIVE STATEMENT
A&Ox4, ambulating. p/w laceration to right 2nd and 3rd digit, today, pt was cutting hedges outside and the stool broke and he attempted to catch himself. pain 10/10, constant, no radiation, moderate bleeding.

## 2021-09-27 NOTE — ED PROVIDER NOTE - NSCAREINITIATED _GEN_ER
Per patient, he is on Lantus insulin-  54 units just at nighttime for a couple of years. RX sent previously - 16 units daily - on earlier RX    Received only 1 pen - wants 5 pens  Running out of med - per pt     Spoke with pharmacist, [address] that the correct dose is 54 units nightly, they said they would update their order and give him 1 package, which is equal to 5 pens.   Order updated on med list - script not printed    Spoke with patient and he is pleased with the corrected dose and RX  Routed to PCP mis Duffy Roque Flaherty(PA)

## 2021-09-27 NOTE — ED PROVIDER NOTE - CLINICAL SUMMARY MEDICAL DECISION MAKING FREE TEXT BOX
55 yo m h/o cva (residual R arm deficits) well appearing pw acute onset of multiple R hand lacs after hedge cutter hit the patients hand pta 16 hr ago, pt is taking blood thinner coumadin last INR couple of weeks ago, reports that bleeding is stopped with pressure but bleeds if pressure is removed. No loc, no cp, no sob. Neurovascular itnact.

## 2021-09-27 NOTE — ED ADULT TRIAGE NOTE - CHIEF COMPLAINT QUOTE
Patient A&Ox4, ambulatory c/o laceration to 2nd and 3rd fingers of right hand with hedge cutter. PMH CVA 2018, hld. Patient on [id number], warfarin.

## 2021-09-27 NOTE — ED PROVIDER NOTE - PATIENT PORTAL LINK FT
You can access the FollowMyHealth Patient Portal offered by [facility] by registering at the following website: [email or web address]. By joining [employer]’s FollowMyHealth portal, you will also be able to view your health information using other applications (apps) compatible with our system.

## 2021-09-27 NOTE — ED PROVIDER NOTE - OBJECTIVE STATEMENT
57 yo m h/o cva (residual R arm deficits) well appearing pw acute onset of multiple R hand lacs after hedge cutter hit the patients hand pta 16 hr ago, pt is taking blood thinner coumadin last INR couple of weeks ago, reports that bleeding is stopped with pressure but bleeds if pressure is removed. No loc, no cp, no sob.    I have reviewed available current nursing and previous documentation of past medical, surgical, family, and/or social history.

## 2021-09-27 NOTE — ED ADULT NURSE NOTE - NSICDXFAMILYHX_GEN_ALL_CORE_FT
FAMILY HISTORY:  Father  Still living? No  Family history of cerebrovascular accident (CVA) in father, Age at diagnosis: Age Unknown

## 2021-09-27 NOTE — ED ADULT NURSE NOTE - CHIEF COMPLAINT QUOTE
Patient A&Ox4, ambulatory c/o laceration to 2nd and 3rd fingers of right hand with hedge cutter. PMH CVA 2018, hld. Patient on [id number], warfarin.

## 2021-09-30 NOTE — ED PROCEDURE NOTE - PROCEDURE ADDITIONAL DETAILS
Pt had multiple lacerations on digits 2 and 3 longer 14 hr laceration sutured loosely to achieve hemostasis pt will be given abx and return for suture removal.

## 2021-10-04 ENCOUNTER — TRANSCRIPTION ENCOUNTER (OUTPATIENT)
Age: 56
End: 2021-10-04

## 2021-10-07 NOTE — OCCUPATIONAL THERAPY INITIAL EVALUATION ADULT - BED MOBILITY LIMITATIONS, REHAB EVAL
decreased ability to use arms for pushing/pulling/decreased ability to use legs for bridging/pushing no

## 2021-10-08 ENCOUNTER — RESULT CHARGE (OUTPATIENT)
Age: 56
End: 2021-10-08

## 2021-10-08 LAB
INR PPP: 1.6 RATIO
POCT-PROTHROMBIN TIME: 19.5 SECS

## 2021-11-02 ENCOUNTER — RESULT CHARGE (OUTPATIENT)
Age: 56
End: 2021-11-02

## 2021-11-10 LAB
INR PPP: 2.2 RATIO
POCT-PROTHROMBIN TIME: 25.9 SECS

## 2021-12-28 NOTE — PHYSICAL THERAPY INITIAL EVALUATION ADULT - PLANNED THERAPY INTERVENTIONS, PT EVAL
Problem: At Risk for Falls  Goal: # Patient does not fall  Outcome: Outcome Not Met, Continue to Monitor  Goal: # Takes action to control fall-related risks  Outcome: Outcome Not Met, Continue to Monitor  Goal: # Verbalizes understanding of fall risk/precautions  Description: Document education using the patient education activity  Outcome: Outcome Not Met, Continue to Monitor     Problem: At Risk for Injury Due to Fall  Goal: # Patient does not fall  Outcome: Outcome Not Met, Continue to Monitor  Goal: # Takes action to control condition specific risks  Outcome: Outcome Not Met, Continue to Monitor  Goal: # Verbalizes understanding of fall-related injury personal risks  Description: Document education using the patient education activity  Outcome: Outcome Not Met, Continue to Monitor     Problem: Impaired Physical Mobility  Goal: # Bed mobility, ambulation, and ADLs are maintained or returned to baseline during hospitalization  Outcome: Outcome Not Met, Continue to Monitor     Problem: VTE, Risk for  Goal: # No s/s of VTE  Outcome: Outcome Not Met, Continue to Monitor  Goal: # Verbalizes understanding of VTE risk factors and prevention  Description: Document education using the patient education activity.   Outcome: Outcome Not Met, Continue to Monitor  Goal: Demonstrates ability to administer injectable anticoagulants if ordered for d/c  Description: Document education using the patient education activity.  Outcome: Outcome Not Met, Continue to Monitor     Problem: VTE (Actual)  Goal: # Verbalizes understanding of VTE and treatment plan  Description: Document education using the patient education activity.   Outcome: Outcome Not Met, Continue to Monitor      gait training/balance training/bed mobility training/transfer training/strengthening/motor coordination training/neuromuscular re-education

## 2022-01-10 ENCOUNTER — APPOINTMENT (OUTPATIENT)
Dept: CARDIOLOGY | Facility: CLINIC | Age: 57
End: 2022-01-10

## 2022-01-10 ENCOUNTER — RESULT CHARGE (OUTPATIENT)
Age: 57
End: 2022-01-10

## 2022-01-10 LAB
INR PPP: 2.3 RATIO
POCT-PROTHROMBIN TIME: 28 SECS

## 2022-01-19 ENCOUNTER — APPOINTMENT (OUTPATIENT)
Dept: CARDIOLOGY | Facility: CLINIC | Age: 57
End: 2022-01-19

## 2022-03-07 ENCOUNTER — APPOINTMENT (OUTPATIENT)
Dept: INTERNAL MEDICINE | Facility: CLINIC | Age: 57
End: 2022-03-07
Payer: COMMERCIAL

## 2022-03-07 ENCOUNTER — NON-APPOINTMENT (OUTPATIENT)
Age: 57
End: 2022-03-07

## 2022-03-07 VITALS
BODY MASS INDEX: 27.3 KG/M2 | TEMPERATURE: 207.68 F | DIASTOLIC BLOOD PRESSURE: 82 MMHG | HEART RATE: 66 BPM | SYSTOLIC BLOOD PRESSURE: 161 MMHG | OXYGEN SATURATION: 98 % | HEIGHT: 71 IN | WEIGHT: 195 LBS

## 2022-03-07 PROCEDURE — G0444 DEPRESSION SCREEN ANNUAL: CPT | Mod: 59

## 2022-03-07 PROCEDURE — 99203 OFFICE O/P NEW LOW 30 MIN: CPT | Mod: 25

## 2022-03-07 PROCEDURE — G0443: CPT

## 2022-03-07 PROCEDURE — 99386 PREV VISIT NEW AGE 40-64: CPT | Mod: 25

## 2022-03-07 PROCEDURE — 36415 COLL VENOUS BLD VENIPUNCTURE: CPT

## 2022-03-07 PROCEDURE — 93000 ELECTROCARDIOGRAM COMPLETE: CPT | Mod: 59

## 2022-03-07 NOTE — HISTORY OF PRESENT ILLNESS
[FreeTextEntry1] : New patient\par Annual exam\par Came for f/u with chronic problems [de-identified] : Patient is 56 year male  with PMH of Hyperlipidemia,, s/p left MCA  stroke  with  thrombectomy,  h/o Anticardiolipin antibody and MTHFR, h/o left atrial clots - currently on Warfarin\par came today for annual physical exam\par \par

## 2022-03-07 NOTE — HEALTH RISK ASSESSMENT
[Fair] : ~his/her~ current health as fair  [Never] : Never [Yes] : Yes [2 - 4 times a month (2 pts)] : 2-4 times a month (2 points) [3 or 4 (1 pt)] : 3 or 4  (1 point) [Never (0 pts)] : Never (0 points) [0] : 2) Feeling down, depressed, or hopeless: Not at all (0) [PHQ-2 Negative - No further assessment needed] : PHQ-2 Negative - No further assessment needed [Patient reported colonoscopy was normal] : Patient reported colonoscopy was normal [HIV Test offered] : HIV Test offered [Hepatitis C test offered] : Hepatitis C test offered [None] : None [With Family] : lives with family [Employed] : employed [] :  [# Of Children ___] : has [unfilled] children [Sexually Active] : sexually active [Feels Safe at Home] : Feels safe at home [Fully functional (bathing, dressing, toileting, transferring, walking, feeding)] : Fully functional (bathing, dressing, toileting, transferring, walking, feeding) [Smoke Detector] : smoke detector [Seat Belt] :  uses seat belt [Sunscreen] : uses sunscreen [Audit-CScore] : 3 [[id number]] : 0 [Change in mental status noted] : No change in mental status noted [Language] : denies difficulty with language [Behavior] : denies difficulty with behavior [Reports changes in dental health] : Reports no changes in dental health [ColonoscopyDate] : 09/28/2020

## 2022-03-07 NOTE — COUNSELING
[Fall prevention counseling provided] : Fall prevention counseling provided [Behavioral health counseling provided] : Behavioral health counseling provided [No] : Risk of tobacco use and health benefits of smoking cessation discussed: No [AUDIT-C Screening administered and reviewed] : AUDIT-C Screening administered and reviewed [Hazards of at-risk alcohol use discussed] : Hazards of at-risk alcohol use discussed [Strategies to reduce or eliminate alcohol use discussed] : Strategies to reduce or eliminate alcohol use discussed [Support options provided] : Support options provided [None] : None [FreeTextEntry1] : 15

## 2022-03-09 ENCOUNTER — TRANSCRIPTION ENCOUNTER (OUTPATIENT)
Age: 57
End: 2022-03-09

## 2022-03-10 LAB
25(OH)D3 SERPL-MCNC: 44.7 NG/ML
ALBUMIN SERPL ELPH-MCNC: 4.7 G/DL
ALP BLD-CCNC: 62 U/L
ALT SERPL-CCNC: 20 U/L
ANION GAP SERPL CALC-SCNC: 14 MMOL/L
APPEARANCE: CLEAR
AST SERPL-CCNC: 22 U/L
BASOPHILS # BLD AUTO: 0.02 K/UL
BASOPHILS NFR BLD AUTO: 0.5 %
BILIRUB SERPL-MCNC: 0.6 MG/DL
BILIRUBIN URINE: NEGATIVE
BLOOD URINE: NORMAL
BUN SERPL-MCNC: 23 MG/DL
CALCIUM SERPL-MCNC: 9.5 MG/DL
CHLORIDE SERPL-SCNC: 104 MMOL/L
CO2 SERPL-SCNC: 23 MMOL/L
COLOR: YELLOW
CREAT SERPL-MCNC: 1.42 MG/DL
EGFR: 58 ML/MIN/1.73M2
EOSINOPHIL # BLD AUTO: 0.23 K/UL
EOSINOPHIL NFR BLD AUTO: 5.5 %
ESTIMATED AVERAGE GLUCOSE: 111 MG/DL
GLUCOSE QUALITATIVE U: NEGATIVE
GLUCOSE SERPL-MCNC: 96 MG/DL
HBA1C MFR BLD HPLC: 5.5 %
HCT VFR BLD CALC: 45.3 %
HGB BLD-MCNC: 14.4 G/DL
HIV1+2 AB SPEC QL IA.RAPID: NONREACTIVE
IMM GRANULOCYTES NFR BLD AUTO: 0.2 %
INR PPP: 1.83 RATIO
IRON SERPL-MCNC: 87 UG/DL
KETONES URINE: NEGATIVE
LEUKOCYTE ESTERASE URINE: NEGATIVE
LYMPHOCYTES # BLD AUTO: 1.26 K/UL
LYMPHOCYTES NFR BLD AUTO: 30.3 %
MAN DIFF?: NORMAL
MCHC RBC-ENTMCNC: 28.5 PG
MCHC RBC-ENTMCNC: 31.8 GM/DL
MCV RBC AUTO: 89.5 FL
MONOCYTES # BLD AUTO: 0.44 K/UL
MONOCYTES NFR BLD AUTO: 10.6 %
NEUTROPHILS # BLD AUTO: 2.2 K/UL
NEUTROPHILS NFR BLD AUTO: 52.9 %
NITRITE URINE: NEGATIVE
PH URINE: 5.5
PLATELET # BLD AUTO: 230 K/UL
POTASSIUM SERPL-SCNC: 4.5 MMOL/L
PROT SERPL-MCNC: 7.5 G/DL
PROTEIN URINE: NEGATIVE
PSA SERPL-MCNC: 1.41 NG/ML
PT BLD: 21.3 SEC
RBC # BLD: 5.06 M/UL
RBC # FLD: 13.8 %
SODIUM SERPL-SCNC: 141 MMOL/L
SPECIFIC GRAVITY URINE: 1.02
T3FREE SERPL-MCNC: 2.82 PG/ML
T4 FREE SERPL-MCNC: 1.2 NG/DL
TSH SERPL-ACNC: 1.25 UIU/ML
UROBILINOGEN URINE: NORMAL
WBC # FLD AUTO: 4.16 K/UL

## 2022-03-21 ENCOUNTER — APPOINTMENT (OUTPATIENT)
Dept: INTERNAL MEDICINE | Facility: CLINIC | Age: 57
End: 2022-03-21
Payer: COMMERCIAL

## 2022-03-21 VITALS
HEIGHT: 71 IN | DIASTOLIC BLOOD PRESSURE: 88 MMHG | OXYGEN SATURATION: 99 % | BODY MASS INDEX: 28 KG/M2 | WEIGHT: 200 LBS | SYSTOLIC BLOOD PRESSURE: 145 MMHG | TEMPERATURE: 98.2 F | HEART RATE: 62 BPM

## 2022-03-21 DIAGNOSIS — R79.89 OTHER SPECIFIED ABNORMAL FINDINGS OF BLOOD CHEMISTRY: ICD-10-CM

## 2022-03-21 LAB — INR PPP: 3.1 RATIO

## 2022-03-21 PROCEDURE — 99214 OFFICE O/P EST MOD 30 MIN: CPT | Mod: 25

## 2022-03-21 PROCEDURE — 85610 PROTHROMBIN TIME: CPT | Mod: QW

## 2022-03-21 NOTE — HISTORY OF PRESENT ILLNESS
[FreeTextEntry1] : Patient came to f/u with lab results\par  HTN new onset\par INR monitor patient is on Warfarin [de-identified] : Patient is 56 year male with PMH of Hyperlipidemia, CRF, s/p left MCA stroke with thrombectomy, h/o Anticardiolipin antibody and MTHFR, h/o left atrial clots - currently on Warfarin came today for f/u with lab results- reviewed and d/w the patient\par HTN new onset management\par Non therapeutic INR management- patient found to have non therapeutic INR reading on last exam, currently on Coumadin 12mg QD for 7 days as recommend \par Today found to have INR- 3.1, recommend to go back to previous regiment- Coumadin  6 mg   X 2 times per week , Coumadin 12 mg X 5 times per week, come back for f/u in 2 wks

## 2022-04-04 ENCOUNTER — APPOINTMENT (OUTPATIENT)
Dept: INTERNAL MEDICINE | Facility: CLINIC | Age: 57
End: 2022-04-04
Payer: COMMERCIAL

## 2022-04-04 VITALS — SYSTOLIC BLOOD PRESSURE: 130 MMHG | DIASTOLIC BLOOD PRESSURE: 85 MMHG

## 2022-04-04 VITALS
WEIGHT: 199 LBS | TEMPERATURE: 97.6 F | HEIGHT: 71 IN | DIASTOLIC BLOOD PRESSURE: 90 MMHG | OXYGEN SATURATION: 100 % | SYSTOLIC BLOOD PRESSURE: 142 MMHG | BODY MASS INDEX: 27.86 KG/M2 | HEART RATE: 55 BPM

## 2022-04-04 DIAGNOSIS — L98.9 DISORDER OF THE SKIN AND SUBCUTANEOUS TISSUE, UNSPECIFIED: ICD-10-CM

## 2022-04-04 LAB — INR PPP: 2 RATIO

## 2022-04-04 PROCEDURE — 99213 OFFICE O/P EST LOW 20 MIN: CPT | Mod: 25

## 2022-04-04 PROCEDURE — 99401 PREV MED CNSL INDIV APPRX 15: CPT

## 2022-04-04 PROCEDURE — 85610 PROTHROMBIN TIME: CPT | Mod: QW

## 2022-04-04 PROCEDURE — G0250: CPT | Mod: 59

## 2022-04-04 NOTE — HISTORY OF PRESENT ILLNESS
[FreeTextEntry8] : Patient is 56 year male with PMH of Hyperlipidemia, CRF, s/p left MCA stroke with thrombectomy, h/o Anticardiolipin antibody and MTHFR, h/o left atrial clots - currently on Warfarin came today for for\par HTN management\par  New skin lesion evaluation above the pubic area\par INR monitor

## 2022-04-18 ENCOUNTER — APPOINTMENT (OUTPATIENT)
Dept: INTERNAL MEDICINE | Facility: CLINIC | Age: 57
End: 2022-04-18
Payer: COMMERCIAL

## 2022-04-18 VITALS
DIASTOLIC BLOOD PRESSURE: 84 MMHG | OXYGEN SATURATION: 99 % | HEIGHT: 71 IN | WEIGHT: 200 LBS | TEMPERATURE: 98.1 F | SYSTOLIC BLOOD PRESSURE: 139 MMHG | HEART RATE: 60 BPM | BODY MASS INDEX: 28 KG/M2

## 2022-04-18 LAB
INR PPP: 2.6 RATIO
POCT-PROTHROMBIN TIME: 31.1 SECS

## 2022-04-18 PROCEDURE — 85610 PROTHROMBIN TIME: CPT | Mod: QW

## 2022-04-18 PROCEDURE — 99213 OFFICE O/P EST LOW 20 MIN: CPT | Mod: 25

## 2022-04-18 NOTE — HISTORY OF PRESENT ILLNESS
[FreeTextEntry1] : Patient came for HTN management\par INR monitor [de-identified] : Patient is 56 year male with PMH of Hyperlipidemia, CRF, s/p left MCA stroke with thrombectomy, h/o Anticardiolipin antibody and MTHFR, h/o left atrial clots - currently on Warfarin came today for for\par HTN management- better controlled, currently on Amlodipine 2.5mg QD\par INR monitor- 2.6, currently patient is on Coumadin 12 mg X 6 times per week and 6 mg X 1 times per week\par Recommend to continue current management and keep an appointment with Hematologist

## 2022-05-02 ENCOUNTER — APPOINTMENT (OUTPATIENT)
Dept: INTERNAL MEDICINE | Facility: CLINIC | Age: 57
End: 2022-05-02
Payer: COMMERCIAL

## 2022-05-02 VITALS
HEART RATE: 79 BPM | OXYGEN SATURATION: 98 % | TEMPERATURE: 98.2 F | SYSTOLIC BLOOD PRESSURE: 137 MMHG | DIASTOLIC BLOOD PRESSURE: 71 MMHG | HEIGHT: 71 IN | BODY MASS INDEX: 28 KG/M2 | WEIGHT: 200 LBS

## 2022-05-02 DIAGNOSIS — I63.512 CEREBRAL INFARCTION DUE TO UNSPECIFIED OCCLUSION OR STENOSIS OF LEFT MIDDLE CEREBRAL ARTERY: ICD-10-CM

## 2022-05-02 DIAGNOSIS — Z79.01 LONG TERM (CURRENT) USE OF ANTICOAGULANTS: ICD-10-CM

## 2022-05-02 LAB
INR PPP: 2.8 RATIO
POCT-PROTHROMBIN TIME: 33.6 SECS

## 2022-05-02 PROCEDURE — 85610 PROTHROMBIN TIME: CPT | Mod: QW

## 2022-05-02 PROCEDURE — 99212 OFFICE O/P EST SF 10 MIN: CPT | Mod: 25

## 2022-05-02 NOTE — HISTORY OF PRESENT ILLNESS
[FreeTextEntry1] : INR monitor [de-identified] : Patient is 56 year male with PMH of Hyperlipidemia, CRF, s/p left MCA stroke with thrombectomy, h/o Anticardiolipin antibody and MTHFR, h/o left atrial clots - currently on Warfarin 12 mg for 6 days per week and 6 mg 1 day per week\par INR- 2.8, recommend to

## 2022-05-09 ENCOUNTER — APPOINTMENT (OUTPATIENT)
Dept: DERMATOLOGY | Facility: CLINIC | Age: 57
End: 2022-05-09
Payer: COMMERCIAL

## 2022-05-09 ENCOUNTER — LABORATORY RESULT (OUTPATIENT)
Age: 57
End: 2022-05-09

## 2022-05-09 ENCOUNTER — NON-APPOINTMENT (OUTPATIENT)
Age: 57
End: 2022-05-09

## 2022-05-09 DIAGNOSIS — D48.5 NEOPLASM OF UNCERTAIN BEHAVIOR OF SKIN: ICD-10-CM

## 2022-05-09 PROCEDURE — 11102 TANGNTL BX SKIN SINGLE LES: CPT

## 2022-05-11 ENCOUNTER — OUTPATIENT (OUTPATIENT)
Dept: OUTPATIENT SERVICES | Facility: HOSPITAL | Age: 57
LOS: 1 days | Discharge: HOME | End: 2022-05-11

## 2022-05-11 DIAGNOSIS — D64.9 ANEMIA, UNSPECIFIED: ICD-10-CM

## 2022-05-12 ENCOUNTER — LABORATORY RESULT (OUTPATIENT)
Age: 57
End: 2022-05-12

## 2022-05-12 ENCOUNTER — NON-APPOINTMENT (OUTPATIENT)
Age: 57
End: 2022-05-12

## 2022-05-12 ENCOUNTER — RESULT REVIEW (OUTPATIENT)
Age: 57
End: 2022-05-12

## 2022-05-12 ENCOUNTER — APPOINTMENT (OUTPATIENT)
Dept: HEMATOLOGY ONCOLOGY | Facility: CLINIC | Age: 57
End: 2022-05-12
Payer: COMMERCIAL

## 2022-05-12 VITALS
TEMPERATURE: 98 F | WEIGHT: 199.52 LBS | OXYGEN SATURATION: 99 % | DIASTOLIC BLOOD PRESSURE: 79 MMHG | SYSTOLIC BLOOD PRESSURE: 143 MMHG | BODY MASS INDEX: 27.93 KG/M2 | RESPIRATION RATE: 15 BRPM | HEART RATE: 57 BPM | HEIGHT: 70.98 IN

## 2022-05-12 LAB
BASOPHILS # BLD AUTO: 0.03 K/UL — SIGNIFICANT CHANGE UP (ref 0–0.2)
BASOPHILS NFR BLD AUTO: 0.8 % — SIGNIFICANT CHANGE UP (ref 0–2)
EOSINOPHIL # BLD AUTO: 0.07 K/UL — SIGNIFICANT CHANGE UP (ref 0–0.5)
EOSINOPHIL NFR BLD AUTO: 1.8 % — SIGNIFICANT CHANGE UP (ref 0–6)
HCT VFR BLD CALC: 44.1 % — SIGNIFICANT CHANGE UP (ref 39–50)
HGB BLD-MCNC: 14.4 G/DL — SIGNIFICANT CHANGE UP (ref 13–17)
IMM GRANULOCYTES NFR BLD AUTO: 0.3 % — SIGNIFICANT CHANGE UP (ref 0–1.5)
LYMPHOCYTES # BLD AUTO: 1.12 K/UL — SIGNIFICANT CHANGE UP (ref 1–3.3)
LYMPHOCYTES # BLD AUTO: 28.4 % — SIGNIFICANT CHANGE UP (ref 13–44)
MCHC RBC-ENTMCNC: 29.2 PG — SIGNIFICANT CHANGE UP (ref 27–34)
MCHC RBC-ENTMCNC: 32.7 G/DL — SIGNIFICANT CHANGE UP (ref 32–36)
MCV RBC AUTO: 89.5 FL — SIGNIFICANT CHANGE UP (ref 80–100)
MONOCYTES # BLD AUTO: 0.37 K/UL — SIGNIFICANT CHANGE UP (ref 0–0.9)
MONOCYTES NFR BLD AUTO: 9.4 % — SIGNIFICANT CHANGE UP (ref 2–14)
NEUTROPHILS # BLD AUTO: 2.35 K/UL — SIGNIFICANT CHANGE UP (ref 1.8–7.4)
NEUTROPHILS NFR BLD AUTO: 59.3 % — SIGNIFICANT CHANGE UP (ref 43–77)
NRBC # BLD: 0 /100 WBCS — SIGNIFICANT CHANGE UP (ref 0–0)
PLATELET # BLD AUTO: 219 K/UL — SIGNIFICANT CHANGE UP (ref 150–400)
RBC # BLD: 4.93 M/UL — SIGNIFICANT CHANGE UP (ref 4.2–5.8)
RBC # FLD: 13.2 % — SIGNIFICANT CHANGE UP (ref 10.3–14.5)
WBC # BLD: 3.95 K/UL — SIGNIFICANT CHANGE UP (ref 3.8–10.5)
WBC # FLD AUTO: 3.95 K/UL — SIGNIFICANT CHANGE UP (ref 3.8–10.5)

## 2022-05-12 PROCEDURE — 99204 OFFICE O/P NEW MOD 45 MIN: CPT

## 2022-05-13 ENCOUNTER — NON-APPOINTMENT (OUTPATIENT)
Age: 57
End: 2022-05-13

## 2022-05-19 LAB
AT III PPP CHRO-ACNC: 66 %
B2 GLYCOPROT1 IGG SER-ACNC: 8.1 SGU
B2 GLYCOPROT1 IGM SER-ACNC: <5 SMU
CARDIOLIPIN IGM SER-MCNC: 10.3 MPL
CARDIOLIPIN IGM SER-MCNC: 12.3 GPL
CONFIRM: 35.7 SEC
DRVVT IMM 1:2 NP PPP: NORMAL
DRVVT SCREEN TO CONFIRM RATIO: 0.95 RATIO
SCREEN DRVVT: 40.1 SEC
SILICA CLOTTING TIME INTERPRETATION: NORMAL
SILICA CLOTTING TIME S/C: 0.71 RATIO

## 2022-05-19 NOTE — HISTORY OF PRESENT ILLNESS
[de-identified] : This is a 56 year old man with a history of CVA in 2014.  Severe stroke right UE weakness and aphasia which he was mostly able to rehabilitated. Stroke in 2018 was labeled with APLS and MTHFR mutation at [facility] started on long term Coumadin along with aspirin and atorvastatin for secondary prevention.   The hypercoaguable workup on 4/5/18 actually showed an anticardiolipin IgG 16 GPL units.  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  Protein C and S normal at 83% each.  DRVVT negative, hexagonal phase negative.  ATIII activity was notably low at 63%.  \par \par Since then patient has recovered well, most of his speech and RUE weakness recovered except for some weakness on the left right hand 2nd and 3rd fingers.

## 2022-05-19 NOTE — ASSESSMENT
[FreeTextEntry1] : This is a 56 year old man with a history of CVA in 2014.  Severe stroke right UE weakness and aphasia which he was mostly able to rehabilitated. Stroke in 2018 was labeled with APLS and MTHFR mutation at [facility] started on long term Coumadin along with aspirin and atorvastatin for secondary prevention.   \par \par In my opinion I do not believe the patient had ever made criteria for the APLS.  The revised [place] Criteria for APLS requires a positive Anticardiolipin Ab > 40 GPL units twice 2 weeks apart.  Patient has a value of  16 GPL that one time in April 2018 as far as I am aware of.  Will recheck this today and if normal, patient can safely transition to Eliquis 5mg BID in place of the coumadin.  \par \par The MTHFR Heterozygous mutation is not a significant hypercoagulable state.  \par \par The Anti Thrombin III at the time of the stroke was 63%.  This was the only value form that time fame that would have significantly contributed to a hypercoagulable state.  This is often spuriously decreased during the time of acute events.  Would recheck this now.  Normal or abnormal, this should still be treated with the prophylactic Elquis 5mg BID and would not  however.  \par \par Addendum 5/19/22\par Called patient and his wife re: bloodwork.  The antiphospholipid antibodies and DRVVT were actually normal. Do not see any evidence of the previously mentioned lupus anticoagulant/APLS syndrome.  ATIII was low again at 66%.  This is the indication for long term anticoagulation, not the APLS.  ATIII can be treated with the DOAC Eliquis.  Reccomended patient start Eliquis 5mg BID the next morning after holding Coumadin for the evening prior to DOAC start.  Can return to eating vegetables again.

## 2022-05-23 RX ORDER — APIXABAN 5 MG/1
5 TABLET, FILM COATED ORAL
Qty: 60 | Refills: 11 | Status: DISCONTINUED | COMMUNITY
Start: 2022-05-19 | End: 2022-05-23

## 2022-08-08 ENCOUNTER — APPOINTMENT (OUTPATIENT)
Dept: UROLOGY | Facility: CLINIC | Age: 57
End: 2022-08-08

## 2022-08-08 VITALS
TEMPERATURE: 97.5 F | OXYGEN SATURATION: 99 % | HEIGHT: 71 IN | RESPIRATION RATE: 12 BRPM | SYSTOLIC BLOOD PRESSURE: 132 MMHG | HEART RATE: 73 BPM | DIASTOLIC BLOOD PRESSURE: 76 MMHG | WEIGHT: 201 LBS | BODY MASS INDEX: 28.14 KG/M2

## 2022-08-08 DIAGNOSIS — N28.1 CYST OF KIDNEY, ACQUIRED: ICD-10-CM

## 2022-08-08 DIAGNOSIS — N32.89 OTHER SPECIFIED DISORDERS OF BLADDER: ICD-10-CM

## 2022-08-08 PROCEDURE — 99213 OFFICE O/P EST LOW 20 MIN: CPT

## 2022-08-08 NOTE — ASSESSMENT
[FreeTextEntry1] : Very pleasant 57-year-old gentleman who presents for follow-up for screening for prostate cancer, BPH, elevated creatinine, renal cyst\par -We discussed the benign nature of renal cysts\par -PSA 1.41\par -A1c 5.5%\par -Creatinine stable at 1.42\par -We discussed that his risk for prostate cancer is low given his negative LADAN and low PSA\par -Follow-up in 1 year with PSA\par \par

## 2022-08-08 NOTE — HISTORY OF PRESENT ILLNESS
[FreeTextEntry1] : Very pleasant 57-year-old gentleman who presents for follow-up of microscopic hematuria, bladder wall thickening, elevated creatinine, renal cyst.  He was previously evaluated for an elevated creatinine in the setting of microscopic hematuria and bladder wall thickening.  At that time he was recommended to undergo a cystoscopy, however he chose not to do so.  He is still not interested in completing cystoscopy for evaluation of bladder wall thickening and microscopic hematuria.  Previously PSA arjun to 2.3 from a baseline of less than 1.  This was rechecked and was found to be 1.1. He feels well.  He reports that he is urinating well.  No other complaints.\par \par Previous renal ultrasound demonstrated a 1.3 cm simple left renal cyst.\par \par PSA from 3/2022 was noted to be 1.41.

## 2022-09-08 ENCOUNTER — RX RENEWAL (OUTPATIENT)
Age: 57
End: 2022-09-08

## 2023-03-02 ENCOUNTER — NON-APPOINTMENT (OUTPATIENT)
Age: 58
End: 2023-03-02

## 2023-03-03 ENCOUNTER — NON-APPOINTMENT (OUTPATIENT)
Age: 58
End: 2023-03-03

## 2023-03-09 ENCOUNTER — RX RENEWAL (OUTPATIENT)
Age: 58
End: 2023-03-09

## 2023-03-26 ENCOUNTER — OUTPATIENT (OUTPATIENT)
Dept: OUTPATIENT SERVICES | Facility: HOSPITAL | Age: 58
LOS: 1 days | Discharge: HOME | End: 2023-03-26

## 2023-03-26 DIAGNOSIS — D64.9 ANEMIA, UNSPECIFIED: ICD-10-CM

## 2023-03-28 ENCOUNTER — APPOINTMENT (OUTPATIENT)
Dept: HEMATOLOGY ONCOLOGY | Facility: CLINIC | Age: 58
End: 2023-03-28
Payer: COMMERCIAL

## 2023-03-28 VITALS
HEART RATE: 58 BPM | DIASTOLIC BLOOD PRESSURE: 83 MMHG | RESPIRATION RATE: 16 BRPM | OXYGEN SATURATION: 100 % | TEMPERATURE: 97.2 F | SYSTOLIC BLOOD PRESSURE: 152 MMHG | BODY MASS INDEX: 27.71 KG/M2 | HEIGHT: 71 IN | WEIGHT: 197.95 LBS

## 2023-03-28 PROCEDURE — 99214 OFFICE O/P EST MOD 30 MIN: CPT

## 2023-03-28 RX ORDER — WARFARIN 6 MG/1
6 TABLET ORAL DAILY
Qty: 60 | Refills: 0 | Status: DISCONTINUED | COMMUNITY
Start: 2022-03-07 | End: 2023-03-28

## 2023-03-28 RX ORDER — WARFARIN 6 MG/1
6 TABLET ORAL
Qty: 180 | Refills: 3 | Status: DISCONTINUED | COMMUNITY
Start: 2018-05-15 | End: 2023-03-28

## 2023-03-28 NOTE — PHYSICAL EXAM
[Normal] : normal appearance, no rash, nodules, vesicles, ulcers, erythema [de-identified] : residual weakness in right face and right index finger followign stroke.

## 2023-03-28 NOTE — ASSESSMENT
[FreeTextEntry1] : This is a 56 year old man with a history of CVA in 2014.  Severe stroke right UE weakness and aphasia which he was mostly able to rehabilitated. Stroke in 2018 was labeled with APLS and MTHFR mutation at [facility] started on long term Coumadin along with aspirin and atorvastatin for secondary prevention.   \par \par Did not make criteria for APLS based on old labs.  New labs were completely negative for the findings for the APLS antibodies.  \par The MTHFR Heterozygous mutation is not a significant hypercoagulable state.  \par \par The Anti Thrombin III at the time of the stroke was 63%.  This is an individual risk factor for the CVA and hypercoagulable states.  Does not require comadin as the preferred AC agent. Patient on Xarelto 20mg daily given Elqiuis was not covered.   Sent prescription specifically to patient's [facility] pharmacy. I am uncertain how  his prescription was sent to [facility], we have no record of this in our EMR at any point.

## 2023-03-28 NOTE — HISTORY OF PRESENT ILLNESS
[de-identified] : This is a 56 year old man with a history of L sided CVA in 2014.  Severe stroke right UE weakness and aphasia which he was mostly able to rehabilitated. Some residual RUE weakness and right facial muscle weakness.  Aphasia resolved.  Stroke in 2018 was labeled with APLS and MTHFR mutation at [facility] started on long term Coumadin along with aspirin and atorvastatin for secondary prevention.   The hypercoagulable workup on 4/5/18 actually showed an anticardiolipin IgG 16 GPL units.  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  Protein C and S normal at 83% each.  DRVVT negative, hexagonal phase negative.  ATIII activity was notably low at 63%.  \par \par Since then patient has recovered well, most of his speech and RUE weakness recovered except for some weakness on the left right hand 2nd and 3rd fingers.   [de-identified] : Patient noticed that a prescription from our office had gone to [facility] where he was unable to use the coupons.  Reviewed the prescription history for the Xarelto, the last 5 refills were sent direct to his [facility] in [place] so I remain perplexed on how this happened.

## 2023-04-18 NOTE — SPEECH LANGUAGE PATHOLOGY EVALUATION - SLP GESTURES
04/18/23        Emma Reyes  [address] [address]        Dear Emma Reyes,    [x] This letter is to remind you that you are due for a:     [x] 1 Year bariatric Follow Up Exam with PEARL Bennett and the Dietitian             Please call PEARL Bennett 's office at [phone] between 8:00 a.m. And 5:00 p.m., Monday - Friday and speak to the . Please have this letter with you when you call.    We are unable to guarantee insurance/Medicare coverage for services provided. Please contact your insurance company with questions regarding coverage.    Thank you,  [facility] is a not-for-profit health care provider and a national leader in efforts to improve the quality of health care.  [email or web address]  
head nod/head shake

## 2023-05-11 NOTE — PROGRESS NOTE ADULT - PROBLEM SELECTOR PROBLEM 1
Anticardiolipin antibody positive
Anticardiolipin antibody positive
Cerebrovascular accident (CVA) due to occlusion of left middle cerebral artery
,

## 2023-06-01 ENCOUNTER — NON-APPOINTMENT (OUTPATIENT)
Age: 58
End: 2023-06-01

## 2023-06-01 ENCOUNTER — APPOINTMENT (OUTPATIENT)
Dept: INTERNAL MEDICINE | Facility: CLINIC | Age: 58
End: 2023-06-01
Payer: COMMERCIAL

## 2023-06-01 VITALS
HEIGHT: 71 IN | HEART RATE: 81 BPM | DIASTOLIC BLOOD PRESSURE: 77 MMHG | OXYGEN SATURATION: 97 % | BODY MASS INDEX: 27.02 KG/M2 | WEIGHT: 193 LBS | SYSTOLIC BLOOD PRESSURE: 114 MMHG

## 2023-06-01 DIAGNOSIS — Z86.73 PERSONAL HISTORY OF TRANSIENT ISCHEMIC ATTACK (TIA), AND CEREBRAL INFARCTION W/OUT RESIDUAL DEFICITS: ICD-10-CM

## 2023-06-01 DIAGNOSIS — R76.0 RAISED ANTIBODY TITER: ICD-10-CM

## 2023-06-01 DIAGNOSIS — E78.2 MIXED HYPERLIPIDEMIA: ICD-10-CM

## 2023-06-01 DIAGNOSIS — R97.20 ELEVATED PROSTATE, SPECIFIC ANTIGEN [PSA]: ICD-10-CM

## 2023-06-01 PROCEDURE — 93000 ELECTROCARDIOGRAM COMPLETE: CPT

## 2023-06-01 PROCEDURE — 99396 PREV VISIT EST AGE 40-64: CPT | Mod: 25

## 2023-06-01 PROCEDURE — 36415 COLL VENOUS BLD VENIPUNCTURE: CPT

## 2023-06-01 RX ORDER — AMLODIPINE BESYLATE 5 MG/1
5 TABLET ORAL DAILY
Qty: 90 | Refills: 1 | Status: ACTIVE | COMMUNITY
Start: 2022-03-21

## 2023-06-01 NOTE — HISTORY OF PRESENT ILLNESS
[FreeTextEntry1] : Annual physical exam [de-identified] : Patient is 57 year male with PMH of Hyperlipidemia, CRF, s/p left MCA stroke with thrombectomy, h/o Anticardiolipin antibody and MTHFR, h/o left atrial clots - currently on Xarelto 20 mg QD \par Seen by Cardiologist, last visit was in 05/2023- the dose of Amlodipine was increased from 2.5mg up to 5 mg QD\par Also taking Atorvastatin 20 mg QHS\par ASA 81 mg\par  Vit D3 daily

## 2023-06-01 NOTE — COUNSELING
[Fall prevention counseling provided] : Fall prevention counseling provided [Adequate lighting] : Adequate lighting [Use proper foot wear] : Use proper foot wear [Behavioral health counseling provided] : Behavioral health counseling provided [Engage in a relaxing activity] : Engage in a relaxing activity

## 2023-06-01 NOTE — PHYSICAL EXAM
[Comprehensive Foot Exam Normal] : Right and left foot were examined and both feet are normal. No ulcers in either foot. Toes are normal and with full ROM.  Normal tactile sensation with monofilament testing throughout both feet [de-identified] : right hand - strenght 3-4 /5 , unable to perform precise work  [de-identified] : normal

## 2023-06-01 NOTE — HEALTH RISK ASSESSMENT
[Good] : ~his/her~ current health as good [Yes] : Yes [2 - 3 times a week (3 pts)] : 2 - 3  times a week (3 points) [1 or 2 (0 pts)] : 1 or 2 (0 points) [Never (0 pts)] : Never (0 points) [No] : In the past 12 months have you used drugs other than those required for medical reasons? No [No falls in past year] : Patient reported no falls in the past year [0] : 2) Feeling down, depressed, or hopeless: Not at all (0) [PHQ-2 Negative - No further assessment needed] : PHQ-2 Negative - No further assessment needed [No Retinopathy] : No retinopathy [Patient reported colonoscopy was normal] : Patient reported colonoscopy was normal [HIV test declined] : HIV test declined [Hepatitis C test offered] : Hepatitis C test offered [Language] : difficulty with language [With Family] : lives with family [Employed] : employed [] :  [# Of Children ___] : has [unfilled] children [Feels Safe at Home] : Feels safe at home [Fully functional (bathing, dressing, toileting, transferring, walking, feeding)] : Fully functional (bathing, dressing, toileting, transferring, walking, feeding) [Fully functional (using the telephone, shopping, preparing meals, housekeeping, doing laundry, using] : Fully functional and needs no help or supervision to perform IADLs (using the telephone, shopping, preparing meals, housekeeping, doing laundry, using transportation, managing medications and managing finances) [Smoke Detector] : smoke detector [Carbon Monoxide Detector] : carbon monoxide detector [Seat Belt] :  uses seat belt [Sunscreen] : uses sunscreen [Reviewed no changes] : Reviewed, no changes [Aggressive treatment] : aggressive treatment [Time Spent: ___ minutes] : Time Spent: [unfilled] minutes [Never] : Never [Audit-CScore] : 3 [de-identified] : biking [OGA4Yolbi] : 0 [EyeExamDate] : 2022 [Change in mental status noted] : No change in mental status noted [Reports changes in hearing] : Reports no changes in hearing [Reports changes in vision] : Reports no changes in vision [ColonoscopyDate] : 09/28/2020 [AdvancecareDate] : 06/01/2023

## 2023-06-01 NOTE — REVIEW OF SYSTEMS
[Fever] : no fever [Chills] : no chills [Fatigue] : no fatigue [FreeTextEntry9] : right hand weakness in the finger and  holding objects, unable to write with right hand

## 2023-06-05 LAB
ALBUMIN SERPL ELPH-MCNC: 4.4 G/DL
ALP BLD-CCNC: 78 U/L
ALT SERPL-CCNC: 18 U/L
ANION GAP SERPL CALC-SCNC: 13 MMOL/L
APPEARANCE: CLEAR
AST SERPL-CCNC: 20 U/L
BILIRUB SERPL-MCNC: 0.6 MG/DL
BILIRUBIN URINE: NEGATIVE
BLOOD URINE: NEGATIVE
BUN SERPL-MCNC: 21 MG/DL
CALCIUM SERPL-MCNC: 9.4 MG/DL
CHLORIDE SERPL-SCNC: 105 MMOL/L
CHOLEST SERPL-MCNC: 141 MG/DL
CO2 SERPL-SCNC: 25 MMOL/L
COLOR: YELLOW
CREAT SERPL-MCNC: 1.54 MG/DL
EGFR: 52 ML/MIN/1.73M2
FOLATE SERPL-MCNC: 8.4 NG/ML
GLUCOSE QUALITATIVE U: NEGATIVE MG/DL
GLUCOSE SERPL-MCNC: 84 MG/DL
HBV SURFACE AB SER QL: NONREACTIVE
HBV SURFACE AG SER QL: NONREACTIVE
HCT VFR BLD CALC: 44.7 %
HCV AB SER QL: NONREACTIVE
HCV S/CO RATIO: 0.12 S/CO
HDLC SERPL-MCNC: 94 MG/DL
HGB BLD-MCNC: 14.3 G/DL
KETONES URINE: NEGATIVE MG/DL
LDLC SERPL CALC-MCNC: 38 MG/DL
LEUKOCYTE ESTERASE URINE: NEGATIVE
MCHC RBC-ENTMCNC: 29.2 PG
MCHC RBC-ENTMCNC: 32 GM/DL
MCV RBC AUTO: 91.4 FL
NITRITE URINE: NEGATIVE
NONHDLC SERPL-MCNC: 47 MG/DL
PH URINE: 6.5
PLATELET # BLD AUTO: 233 K/UL
POTASSIUM SERPL-SCNC: 4.3 MMOL/L
PROT SERPL-MCNC: 7.3 G/DL
PROTEIN URINE: NEGATIVE MG/DL
RBC # BLD: 4.89 M/UL
RBC # FLD: 14.6 %
SODIUM SERPL-SCNC: 143 MMOL/L
SPECIFIC GRAVITY URINE: 1.02
TRIGL SERPL-MCNC: 45 MG/DL
TSH SERPL-ACNC: 1.36 UIU/ML
UROBILINOGEN URINE: 1 MG/DL
VIT B12 SERPL-MCNC: 472 PG/ML
WBC # FLD AUTO: 4.02 K/UL

## 2023-06-05 NOTE — PROGRESS NOTE ADULT - PROVIDER SPECIALTY LIST ADULT
Neurology Nilsa is a 6 year old male presenting with seizure-like activity.     HPI      Patient is presenting with concern for prolonged seizures. Patient had been sick over the past 2 days prior to admission. No fevers, Temperature max was 99.8F. Treated with Tylenol once on day of admission at 1300 and once on the day prior. Patient had 5-6 episodes of NBNB emesis on day of admission, decreased PO intake but tolerating Pedialyte, 2 times UOP for the day. No diarrhea or rash. No known sick contact. At about 1500, parents noticed episode of patient staring into space, unresponsive to his name being called, thought he might be dehydrated or having a seizure. This lasted for 5 minutes, not followed by state of confusion. During this time, patient was walking, holding mothers hand, eyes open looking all around and turning head from side to side quickly. Parents were concerned for dehydration given this episode, which prompted them to take patient to ER. Home Valtoco not given.     Of note, patient was admitted to the general pediatric floor from 5/29-5/31 for new onset febrile seizure. Neurology was consulted. During the admission, he had a CT head and sleep deprived EEG which both returned with no acute abnormalities. MRI read as \"Abnormal high T2 signal within the deep gray nuclei bilaterally, which is nonspecific in etiology but can be seen in  exposure to toxins, mitochondrial diseases, or deposition disease.\" He was discharged to home with Valtoco as needed for seizure.     In the [facility] ER, patient was afebrile, vitals stable, SpO2 94% on room air. Patient had one episode of NBNB emesis, improved with Zofran. Seizure-like activity was witnessed by ER physician, described as \"patient unresponsive, staring, head deviation to left, eyes rolling back, protecting airway.\" Parents stated this episode appeared similar to the one that occurred earlier in the day. Given Valtoco intranasal spray. This episode  stopped after 7 minutes, followed by post-ictal state. Nonrebreather applied due to large emesis, SpO2 100%. Pediatric Neurologist Dr. Braxton was consulted and recommended administration of Ativan and to load with Keppra if any additional seizure was witnessed. No additional seizures witnessed in ER.  Labs:   WBC 17.7, Hgb 11.7, Plt 410  CMP WNL  Strep negative  Medications: Valtoco intranasal spray, 20 mL/kg NS bolus, Zofran  Patient transferred on 8L nonrebreather mask, but this was discontinued during transport. Patient transferred to [facility] PICU for higher level of care for concern for status epilepticus given repeated prolonged seizure-like episodes and current ongoing workup from prior admission for deposition disease or mitochondrial disorder.     On arrival to the PICU, patient afebrile, vitals stable. He is awake, sitting up in bed, breathing comfortably on room air. He is cooperative with exam, although fussy. Producing tears. Mother describes patient is at baseline. Patient tolerating PO intake in PICU with several spontaneous voids. Also had BM on day of admission. Patient intermittently describing discomfort with urination.    Floor Course (6/5- ):  Transferred from PICU to floor this morning to complete neurologic work-up. Upon arrival to the floor, mother states patient is back to his activity baseline. No further episodes of emesis or seizure activity while admitted. UA unremarkable. Drinking more overnight, though mother feels not enough to stop fluids yet. Mother states lab tests were drawn by the PMD last week and they were in the process of a 24-hour urine collection for testing when patient got sick, so this was not completed yet.     EEG done 6/5 AM with read pending, neuro recommending Keppra load and continued maintenance dosing.    Genetics paged, recommended ***

## 2023-06-06 ENCOUNTER — NON-APPOINTMENT (OUTPATIENT)
Age: 58
End: 2023-06-06

## 2023-06-14 NOTE — PROGRESS NOTE ADULT - I WAS PHYSICALLY PRESENT FOR THE KEY PORTIONS OF THE EVALUATION AND MANAGEMENT (E/M) SERVICE PROVIDED.  I AGREE WITH THE ABOVE HISTORY, PHYSICAL, AND PLAN WHICH I HAVE REVIEWED AND EDITED WHERE APPROPRIATE
Statement Selected
This document is complete and the patient is ready for discharge.
Statement Selected

## 2023-08-14 ENCOUNTER — APPOINTMENT (OUTPATIENT)
Dept: UROLOGY | Facility: CLINIC | Age: 58
End: 2023-08-14

## 2023-08-21 NOTE — PHYSICAL THERAPY INITIAL EVALUATION ADULT - LEVEL OF INDEPENDENCE: SIT/SUPINE, REHAB EVAL
A user error has taken place: encounter opened in error, closed for administrative reasons. minimum assist (75% patients effort)/contact guard

## 2023-09-22 ENCOUNTER — OUTPATIENT (OUTPATIENT)
Dept: OUTPATIENT SERVICES | Facility: HOSPITAL | Age: 58
LOS: 1 days | Discharge: HOME | End: 2023-09-22

## 2023-09-22 DIAGNOSIS — D64.9 ANEMIA, UNSPECIFIED: ICD-10-CM

## 2023-10-02 ENCOUNTER — APPOINTMENT (OUTPATIENT)
Dept: HEMATOLOGY ONCOLOGY | Facility: CLINIC | Age: 58
End: 2023-10-02
Payer: COMMERCIAL

## 2023-10-02 VITALS
HEART RATE: 68 BPM | WEIGHT: 191.58 LBS | HEIGHT: 71 IN | OXYGEN SATURATION: 99 % | RESPIRATION RATE: 16 BRPM | BODY MASS INDEX: 26.82 KG/M2 | TEMPERATURE: 97.2 F | DIASTOLIC BLOOD PRESSURE: 81 MMHG | SYSTOLIC BLOOD PRESSURE: 155 MMHG

## 2023-10-02 PROCEDURE — 99214 OFFICE O/P EST MOD 30 MIN: CPT

## 2023-10-02 RX ORDER — RIVAROXABAN 20 MG/1
20 TABLET, FILM COATED ORAL
Qty: 90 | Refills: 3 | Status: ACTIVE | COMMUNITY
Start: 2022-05-23 | End: 1900-01-01

## 2023-10-03 NOTE — SPEECH LANGUAGE PATHOLOGY EVALUATION - SLP ORAL READING ABILITY
BP controlled at home  Continue current medications  
Check a1c  Metformin 1000mg BID  Add jardiance daily  F/u in 4 months  Eye exam up to date  On ARB and statin  
Check lipids   On simvastatin 20 mg daily  
Controlled on paroxetine  
Due for mammo and DEXA  Colonoscopy done in 20212  Flu and pneumococcal 20 today  Consider COVID booster and Shingrex at pharmacy  Will get COVID booster in November  
Follows with gyne yearly  
words/intact

## 2023-10-07 ENCOUNTER — LABORATORY RESULT (OUTPATIENT)
Age: 58
End: 2023-10-07

## 2023-10-31 NOTE — ED PROCEDURE NOTE - NS ED ATTENDING NAME FT
Nutrition Progress Note  (Physician Action Needed)    Physician- please edit note, check the appropriate diagnosis from list below and indicate whether you agree with the plan of care    The registered dietitian has identified that this patient meets criteria for malnutrition.    Nutrition Diagnosis:  Nutrition Diagnosis: Malnutrition  Malnutrition in the context of chronic illness: Non-severe (moderate)  Related to: Decreased intake   Malnutrition chronic; non-severe: Mild depletion of muscle mass, Weight loss of 10%/6 months  Primary Nutrition Diagnosis status: New nutrition diagnosis    Intervention:  Intervention: Meals and snacks, Nutrition supplement therapy, Nutrition education      Meals & snacks: Continue IDDSI 7 easy to chew diet. Encouraged intake of small, frequent meals with emphasis on protein.      Nutrition supplement therapy: patient agreeable to trial strawberry Premier protein once daily.      Nutrition education: Nutrition tips for home added to AVS.    Physician Documentation  Based on above, patient meets criteria for:    []  Severe Protein Calorie Malnutrition  [x]  Moderate Protein Calorie Malnutrition  []  Mild Protein Calorie Malnutrition  [] Unable to determine   [] Other:     * Please add the appropriate diagnosis to the patient's Problem List and subsequent Progress Notes.     Plan of care:   [x]  Agree with nutrition assessment and plan of care as stated above.    [] Agree with nutrition assessment and plan of care, with exception. Patient meets criteria for diagnosis except ________.    [] Disagree with nutrition assessment and plan of care because_________.         Dr Borden, L

## 2023-11-09 ENCOUNTER — EMERGENCY (EMERGENCY)
Facility: HOSPITAL | Age: 58
LOS: 0 days | Discharge: HOME | End: 2023-11-09
Attending: EMERGENCY MEDICINE
Payer: COMMERCIAL

## 2023-11-09 VITALS
RESPIRATION RATE: 16 BRPM | TEMPERATURE: 98 F | HEART RATE: 58 BPM | OXYGEN SATURATION: 99 % | SYSTOLIC BLOOD PRESSURE: 161 MMHG | DIASTOLIC BLOOD PRESSURE: 81 MMHG

## 2023-11-09 VITALS
HEIGHT: 71 IN | TEMPERATURE: 98 F | WEIGHT: 186.95 LBS | DIASTOLIC BLOOD PRESSURE: 87 MMHG | HEART RATE: 63 BPM | OXYGEN SATURATION: 100 % | SYSTOLIC BLOOD PRESSURE: 143 MMHG | RESPIRATION RATE: 18 BRPM

## 2023-11-09 DIAGNOSIS — S61.012A LACERATION WITHOUT FOREIGN BODY OF LEFT THUMB WITHOUT DAMAGE TO NAIL, INITIAL ENCOUNTER: ICD-10-CM
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
## 2023-11-09 PROCEDURE — 99285 EMERGENCY DEPT VISIT HI MDM: CPT | Mod: 25

## 2023-11-09 PROCEDURE — 12001 RPR S/N/AX/GEN/TRNK 2.5CM/<: CPT

## 2023-11-09 PROCEDURE — 73140 X-RAY EXAM OF FINGER(S): CPT | Mod: 26,LT

## 2023-11-09 RX ORDER — AMPICILLIN SODIUM AND SULBACTAM SODIUM 250; 125 MG/ML; MG/ML
3 INJECTION, POWDER, FOR SUSPENSION INTRAMUSCULAR; INTRAVENOUS ONCE
Refills: 0 | Status: COMPLETED | OUTPATIENT
Start: 2023-11-09 | End: 2023-11-09

## 2023-11-09 RX ORDER — TETANUS TOXOID, REDUCED DIPHTHERIA TOXOID AND ACELLULAR PERTUSSIS VACCINE, ADSORBED 5; 2.5; 8; 8; 2.5 [IU]/.5ML; [IU]/.5ML; UG/.5ML; UG/.5ML; UG/.5ML
0.5 SUSPENSION INTRAMUSCULAR ONCE
Refills: 0 | Status: DISCONTINUED | OUTPATIENT
Start: 2023-11-09 | End: 2023-11-09

## 2023-11-09 RX ORDER — OXYCODONE AND ACETAMINOPHEN 5; 325 MG/1; MG/1
1 TABLET ORAL
Qty: 12 | Refills: 0
Start: 2023-11-09 | End: 2023-11-11

## 2023-11-09 RX ADMIN — Medication 1 TABLET(S): at 22:14

## 2023-11-09 RX ADMIN — AMPICILLIN SODIUM AND SULBACTAM SODIUM 200 GRAM(S): 250; 125 INJECTION, POWDER, FOR SUSPENSION INTRAMUSCULAR; INTRAVENOUS at 23:06

## 2023-11-09 NOTE — ED PROVIDER NOTE - WET READ LAUNCH FT
There are no Wet Read(s) to document. Cyclosporine Pregnancy And Lactation Text: This medication is Pregnancy Category C and it isn't know if it is safe during pregnancy. This medication is excreted in breast milk.

## 2023-11-09 NOTE — ED PROVIDER NOTE - PATIENT PORTAL LINK FT
You can access the FollowMyHealth Patient Portal offered by [facility] by registering at the following website: [email or web address]. By joining [facility]’s FollowMyHealth portal, you will also be able to view your health information using other applications (apps) compatible with our system.

## 2023-11-09 NOTE — ED PROVIDER NOTE - CARE PROVIDER_API CALL
Darren Felton  Plastic Surgery  [address] [place] [address], [address]  [place] [address]  Phone: [phone]  Fax: [phone]  Follow Up Time: Urgent

## 2023-11-09 NOTE — ED PROCEDURE NOTE - CPROC ED POST PROC CARE GUIDE1
EOMI
Verbal/written post procedure instructions were given to patient/caregiver./Instructed patient/caregiver to follow-up with primary care physician./Instructed patient/caregiver regarding signs and symptoms of infection./Keep the cast/splint/dressing clean and dry.

## 2023-11-09 NOTE — ED ADULT TRIAGE NOTE - CHIEF COMPLAINT QUOTE
cut left thumb on table saw, on xarelto, patient left thumb wrapped by family member and bleeding controlled

## 2023-11-09 NOTE — ED ADULT NURSE NOTE - NSFALLUNIVINTERV_ED_ALL_ED
Bed/Stretcher in lowest position, wheels locked, appropriate side rails in place/Call bell, personal items and telephone in reach/Instruct patient to call for assistance before getting out of bed/chair/stretcher/Non-slip footwear applied when patient is off stretcher/[place] to call system/Physically safe environment - no spills, clutter or unnecessary equipment/Purposeful proactive rounding/Room/bathroom lighting operational, light cord in reach

## 2023-11-09 NOTE — ED PROVIDER NOTE - OBJECTIVE STATEMENT
57 yo M with laceration to distal L thumb, sustained 5 hours ago, while cutting wood with table saw.  Pt. complains of local pain and bleeding, but bleeding has since stopped.  no other complaints or injury.  Pt. is up to date on tetanus, last being about 2 year ago for a different finger laceration.   ROS: negative for fever, cough, headache, chest pain, shortness of breath, abd pain, nausea, vomiting, diarrhea, rash, paresthesia, and weakness--all other systems reviewed are negative.   PMH: prior CVA; Meds: on Xarelto; SH: Denies smoking/drinking/drug use

## 2023-11-09 NOTE — ED PROVIDER NOTE - CLINICAL SUMMARY MEDICAL DECISION MAKING FREE TEXT BOX
57 yo M with L thumb laceration, utd on tetanus  -percocet for pain, lac repair, augment coverage  -consult with plastics, XR L thumb to r/o bony injury

## 2023-11-09 NOTE — ED PROVIDER NOTE - PHYSICAL EXAMINATION
Vitals: HTn at 143/87, otherwise WNL  Gen: AAOx3, NAD, sitting comfortably in stretcher, calm, non-toxic   Head: ncat, perrla, eomi b/l  Neck: supple, no lymphadenopathy, no midline deviation  Heart: rrr, no m/r/g  Lungs: CTA b/l, no rales/ronchi/wheezes  Abd: soft, nontender, non-distended, no rebound or guarding  Ext: no clubbing/cyanosis/edema, L thumb lac is distal, palmar aspect, 2 cm in length, no FB, normal motion of thumb in all direction, midline laceration spares nail/nail bed, sensation intact, minimal oozing of dark blood present  Neuro: sensation and muscle strength intact b/l, steady gait Vitals: HTn at 143/87, otherwise WNL  Gen: AAOx3, NAD, sitting comfortably in stretcher, calm, non-toxic   Head: ncat, perrla, eomi b/l  Neck: supple, no lymphadenopathy, no midline deviation  Heart: rrr, no m/r/g  Lungs: CTA b/l, no rales/ronchi/wheezes  Abd: soft, nontender, non-distended, no rebound or guarding  Ext: no clubbing/cyanosis/edema, L thumb lac is distal, palmar aspect, oval shaped, 3 cm in length, involving lateral/distal nail bed by 1 cm, + deep lac involving bone, no FB, normal motion of thumb in all directions, sensation intact, minimal oozing of dark blood present  Neuro: sensation and muscle strength intact b/l, steady gait

## 2023-11-09 NOTE — ED PROVIDER NOTE - PROGRESS NOTE DETAILS
lac repaired, Dr. Felton consulted by telephone, recommends unasyn 3 g IV now and augmentin for 10 days post d/c with outpt. f/u in his office urgently   pt. and wife instructed on plan of care, in agreement  XR shows bony deformity--plastics aware, no urgent intervention needed at this time  loose closure performed with 5 sutures of laceration, non-stick guaze/cling applied, no active bleeding   will given unasyn IV and d/c tonight

## 2023-11-09 NOTE — ED ADULT NURSE NOTE - OBJECTIVE STATEMENT
Pt AAOx4. 58 year old male with past medical history of CVA in 2018 and residual right sided upper extremity weakness presents to ED s/p laceration on left thumb from table saw. Finger is wrapped and bleeding is controlled at this time. Sensation intact. Denies fever, chills, nausea, vomiting, chest pain, shortness of breath, paresthesias, numbness, dizziness. Respirations equal and unlabored. No acute distress noted at this time. Wife at bedside. Pt AAOx4. 58 year old male with past medical history of CVA in 2018 and residual right sided upper extremity weakness (on xarelto); presents to ED s/p laceration on left thumb from table saw. Finger is wrapped and bleeding is controlled at this time. Sensation intact. Denies fever, chills, nausea, vomiting, chest pain, shortness of breath, paresthesias, numbness, dizziness. Respirations equal and unlabored. No acute distress noted at this time. Wife at bedside. Pt states he is UTD on tetanus shot.

## 2023-12-01 ENCOUNTER — RX RENEWAL (OUTPATIENT)
Age: 58
End: 2023-12-01

## 2023-12-03 RX ORDER — ATORVASTATIN CALCIUM 20 MG/1
20 TABLET, FILM COATED ORAL
Qty: 90 | Refills: 1 | Status: ACTIVE | COMMUNITY
Start: 2020-05-05 | End: 1900-01-01

## 2024-03-12 NOTE — PROGRESS NOTE ADULT - PROVIDER SPECIALTY LIST ADULT
Neurology [FreeTextEntry1] : Rylee is a 6 yo F with R ankle injury   XR of the R ankle obtained from day of injury, showing possible small avulsion fracture of distal fibula versus ossification center. Soft tissue swelling. Clinical exam is consistent with an ankle sprain. Patient is allowed to start WBAT in regular shoes, can wean from crutches as tolerated. Patient can use tylenol/motrin as needed for pain.  We recommend avoiding gym/sports/physical activity. A school note was provided. We recommended f/u in our office in 2 weeks. Repeat right ankle XR at f/u visit.   Today's visit included obtaining the history from the child and parent, due to the child's age, the child could not be considered a reliable historian, requiring the parent to act as an independent historian. The condition, natural history, and prognosis were explained to the patient and family. The clinical findings and images were reviewed with the family. All questions answered. Family expressed understanding and agreement with the above.  I, Ly Varma PA-C, acted as scribe and documented the above for Dr. Siddiqui.

## 2024-06-06 ENCOUNTER — APPOINTMENT (OUTPATIENT)
Dept: INTERNAL MEDICINE | Facility: CLINIC | Age: 59
End: 2024-06-06
Payer: COMMERCIAL

## 2024-06-06 ENCOUNTER — LABORATORY RESULT (OUTPATIENT)
Age: 59
End: 2024-06-06

## 2024-06-06 ENCOUNTER — NON-APPOINTMENT (OUTPATIENT)
Age: 59
End: 2024-06-06

## 2024-06-06 VITALS
SYSTOLIC BLOOD PRESSURE: 120 MMHG | WEIGHT: 180 LBS | BODY MASS INDEX: 25.2 KG/M2 | HEIGHT: 71 IN | OXYGEN SATURATION: 99 % | HEART RATE: 67 BPM | DIASTOLIC BLOOD PRESSURE: 84 MMHG

## 2024-06-06 DIAGNOSIS — N18.31 CHRONIC KIDNEY DISEASE, STAGE 3A: ICD-10-CM

## 2024-06-06 DIAGNOSIS — Z00.00 ENCOUNTER FOR GENERAL ADULT MEDICAL EXAMINATION W/OUT ABNORMAL FINDINGS: ICD-10-CM

## 2024-06-06 DIAGNOSIS — E55.9 VITAMIN D DEFICIENCY, UNSPECIFIED: ICD-10-CM

## 2024-06-06 DIAGNOSIS — K43.9 VENTRAL HERNIA W/OUT OBSTRUCTION OR GANGRENE: ICD-10-CM

## 2024-06-06 DIAGNOSIS — I10 ESSENTIAL (PRIMARY) HYPERTENSION: ICD-10-CM

## 2024-06-06 DIAGNOSIS — N40.0 BENIGN PROSTATIC HYPERPLASIA WITHOUT LOWER URINARY TRACT SYMPMS: ICD-10-CM

## 2024-06-06 DIAGNOSIS — Z13.29 ENCOUNTER FOR SCREENING FOR OTHER SUSPECTED ENDOCRINE DISORDER: ICD-10-CM

## 2024-06-06 DIAGNOSIS — Z13.0 ENCOUNTER FOR SCREENING FOR DISEASES OF THE BLOOD AND BLOOD-FORMING ORGANS AND CERTAIN DISORDERS INVOLVING THE IMMUNE MECHANISM: ICD-10-CM

## 2024-06-06 DIAGNOSIS — Z12.5 ENCOUNTER FOR SCREENING FOR MALIGNANT NEOPLASM OF PROSTATE: ICD-10-CM

## 2024-06-06 DIAGNOSIS — I63.9 CEREBRAL INFARCTION, UNSPECIFIED: ICD-10-CM

## 2024-06-06 DIAGNOSIS — D68.59 OTHER PRIMARY THROMBOPHILIA: ICD-10-CM

## 2024-06-06 DIAGNOSIS — I42.2 OTHER HYPERTROPHIC CARDIOMYOPATHY: ICD-10-CM

## 2024-06-06 PROCEDURE — 36415 COLL VENOUS BLD VENIPUNCTURE: CPT

## 2024-06-06 PROCEDURE — 99396 PREV VISIT EST AGE 40-64: CPT

## 2024-06-06 PROCEDURE — 93000 ELECTROCARDIOGRAM COMPLETE: CPT

## 2024-06-06 NOTE — PHYSICAL EXAM
[Comprehensive Foot Exam Normal] : Right and left foot were examined and both feet are normal. No ulcers in either foot. Toes are normal and with full ROM.  Normal tactile sensation with monofilament testing throughout both feet [de-identified] : right hand - strenght 3-4 /5 , unable to perform precise work  [de-identified] : normal

## 2024-06-06 NOTE — HEALTH RISK ASSESSMENT
[Yes] : Yes [2 - 3 times a week (3 pts)] : 2 - 3  times a week (3 points) [1 or 2 (0 pts)] : 1 or 2 (0 points) [Never (0 pts)] : Never (0 points) [No] : In the past 12 months have you used drugs other than those required for medical reasons? No [No falls in past year] : Patient reported no falls in the past year [0] : 2) Feeling down, depressed, or hopeless: Not at all (0) [PHQ-2 Negative - No further assessment needed] : PHQ-2 Negative - No further assessment needed [Never] : Never [No Retinopathy] : No retinopathy [Patient reported colonoscopy was normal] : Patient reported colonoscopy was normal [HIV test declined] : HIV test declined [Language] : difficulty with language [With Family] : lives with family [Employed] : employed [] :  [# Of Children ___] : has [unfilled] children [Feels Safe at Home] : Feels safe at home [Fully functional (bathing, dressing, toileting, transferring, walking, feeding)] : Fully functional (bathing, dressing, toileting, transferring, walking, feeding) [Fully functional (using the telephone, shopping, preparing meals, housekeeping, doing laundry, using] : Fully functional and needs no help or supervision to perform IADLs (using the telephone, shopping, preparing meals, housekeeping, doing laundry, using transportation, managing medications and managing finances) [Smoke Detector] : smoke detector [Carbon Monoxide Detector] : carbon monoxide detector [Seat Belt] :  uses seat belt [Sunscreen] : uses sunscreen [Reviewed no changes] : Reviewed, no changes [Aggressive treatment] : aggressive treatment [Time Spent: ___ minutes] : Time Spent: [unfilled] minutes [Fair] : ~his/her~ current health as fair  [Good] : ~his/her~  mood as  good [Little interest or pleasure doing things] : 1) Little interest or pleasure doing things [Feeling down, depressed, or hopeless] : 2) Feeling down, depressed, or hopeless [Hepatitis C test declined] : Hepatitis C test declined [Audit-CScore] : 3 [de-identified] : biking [RCC1Xzqio] : 0 [EyeExamDate] : 2022 [Change in mental status noted] : No change in mental status noted [Reports changes in hearing] : Reports no changes in hearing [Reports changes in vision] : Reports no changes in vision [ColonoscopyDate] : 09/28/2020 [AdvancecareDate] : 06/01/2023

## 2024-06-06 NOTE — COUNSELING
[Fall prevention counseling provided] : Fall prevention counseling provided [Adequate lighting] : Adequate lighting [Use proper foot wear] : Use proper foot wear [Behavioral health counseling provided] : Behavioral health counseling provided [Engage in a relaxing activity] : Engage in a relaxing activity [Potential consequences of obesity discussed] : Potential consequences of obesity discussed [Benefits of weight loss discussed] : Benefits of weight loss discussed [Weigh Self Weekly] : weigh self weekly [Decrease Portions] : decrease portions [Keep Food Diary] : keep food diary

## 2024-06-06 NOTE — HISTORY OF PRESENT ILLNESS
[FreeTextEntry1] : Annual physical exam [de-identified] : Patient is 58 year male with PMH of Hyperlipidemia, CRF, s/p left MCA stroke with thrombectomy, h/o Anticardiolipin antibody and MTHFR, h/o left atrial clots - currently on Xarelto 20 mg QD  Seen by Cardiologist, last visit was in 05/2024-  stable as per patient C/o mass painless above the umbilicus Currently on  Atorvastatin 20 mg QHS ASA 81 mg  Vit D3 daily Xarelto 20mg QD Amlodipine 5 mg QD

## 2024-06-07 LAB
25(OH)D3 SERPL-MCNC: 56.7 NG/ML
ALBUMIN SERPL ELPH-MCNC: 4.2 G/DL
ALP BLD-CCNC: 90 U/L
ALT SERPL-CCNC: 24 U/L
ANION GAP SERPL CALC-SCNC: 12 MMOL/L
APPEARANCE: CLEAR
AST SERPL-CCNC: 24 U/L
BILIRUB SERPL-MCNC: 0.5 MG/DL
BILIRUBIN URINE: NEGATIVE
BLOOD URINE: ABNORMAL
BUN SERPL-MCNC: 18 MG/DL
CALCIUM SERPL-MCNC: 8.8 MG/DL
CHLORIDE SERPL-SCNC: 105 MMOL/L
CHOLEST SERPL-MCNC: 120 MG/DL
CO2 SERPL-SCNC: 24 MMOL/L
COLOR: YELLOW
CREAT SERPL-MCNC: 1.36 MG/DL
EGFR: 60 ML/MIN/1.73M2
FOLATE SERPL-MCNC: 11.6 NG/ML
GLUCOSE QUALITATIVE U: NEGATIVE MG/DL
GLUCOSE SERPL-MCNC: 84 MG/DL
HCT VFR BLD CALC: 41.9 %
HDLC SERPL-MCNC: 84 MG/DL
HGB BLD-MCNC: 13.7 G/DL
IRON SERPL-MCNC: 54 UG/DL
KETONES URINE: NEGATIVE MG/DL
LDLC SERPL CALC-MCNC: 26 MG/DL
LEUKOCYTE ESTERASE URINE: NEGATIVE
MCHC RBC-ENTMCNC: 29.6 PG
MCHC RBC-ENTMCNC: 32.7 GM/DL
MCV RBC AUTO: 90.5 FL
NITRITE URINE: NEGATIVE
NONHDLC SERPL-MCNC: 36 MG/DL
PH URINE: 6
PLATELET # BLD AUTO: 177 K/UL
POTASSIUM SERPL-SCNC: 4.1 MMOL/L
PROT SERPL-MCNC: 6.9 G/DL
PROTEIN URINE: NEGATIVE MG/DL
PSA SERPL-MCNC: 1.58 NG/ML
RBC # BLD: 4.63 M/UL
RBC # FLD: 14.5 %
SODIUM SERPL-SCNC: 140 MMOL/L
SPECIFIC GRAVITY URINE: 1.02
TRIGL SERPL-MCNC: 35 MG/DL
TSH SERPL-ACNC: 1.56 UIU/ML
UROBILINOGEN URINE: 1 MG/DL
VIT B12 SERPL-MCNC: 517 PG/ML
WBC # FLD AUTO: 2.81 K/UL

## 2024-06-10 ENCOUNTER — NON-APPOINTMENT (OUTPATIENT)
Age: 59
End: 2024-06-10

## 2024-06-22 ENCOUNTER — APPOINTMENT (OUTPATIENT)
Dept: SURGERY | Facility: CLINIC | Age: 59
End: 2024-06-22
Payer: COMMERCIAL

## 2024-06-22 VITALS
HEART RATE: 56 BPM | HEIGHT: 71 IN | SYSTOLIC BLOOD PRESSURE: 126 MMHG | BODY MASS INDEX: 25.2 KG/M2 | OXYGEN SATURATION: 98 % | DIASTOLIC BLOOD PRESSURE: 84 MMHG | WEIGHT: 180 LBS

## 2024-06-22 DIAGNOSIS — K43.9 VENTRAL HERNIA W/OUT OBSTRUCTION OR GANGRENE: ICD-10-CM

## 2024-06-22 PROCEDURE — 99204 OFFICE O/P NEW MOD 45 MIN: CPT

## 2024-06-22 NOTE — PHYSICAL EXAM
[No Rash or Lesion] : No rash or lesion [Purpura] : no purpura  [Petechiae] : no petechiae [Skin Ulcer] : no ulcer [Skin Induration] : no induration [Alert] : alert [Oriented to Person] : oriented to person [Oriented to Place] : oriented to place [Oriented to Time] : oriented to time [Calm] : calm [de-identified] : not in distress [de-identified] : soft, NT, ND, no guarding, ~3cm ventral hernia supra umbilical, reducible, nontender

## 2024-06-22 NOTE — HISTORY OF PRESENT ILLNESS
[de-identified] : 58M w/ hx of HLD, CRF, hx of L MCA stroke (s/p thrombectomy, hx of anticardiolipin antibody and MTHFR, hx of L atrial clots, on ASA/Xarelto 20mg QD), presents for evaluation of ventral hernia. Pt goes to the gym often and lifts heavy weights, is asymptomatic from the hernia, and was referred by his PCP. Pt otherwise has been eating well, having regular GI function.

## 2024-06-22 NOTE — PLAN
[FreeTextEntry1] : - discussed my findings with the patient, discussed the pathophysiology of ventral hernia. I discussed that due to his medical conditions hernia repair does not have to be done urgently. - CT abd/pelv w/ IV cont to better evaluate the hernia - 6mo follow up

## 2024-07-02 ENCOUNTER — APPOINTMENT (OUTPATIENT)
Dept: CT IMAGING | Facility: IMAGING CENTER | Age: 59
End: 2024-07-02
Payer: COMMERCIAL

## 2024-07-02 ENCOUNTER — OUTPATIENT (OUTPATIENT)
Dept: OUTPATIENT SERVICES | Facility: HOSPITAL | Age: 59
LOS: 1 days | End: 2024-07-02
Payer: COMMERCIAL

## 2024-07-02 DIAGNOSIS — K43.9 VENTRAL HERNIA WITHOUT OBSTRUCTION OR GANGRENE: ICD-10-CM

## 2024-07-02 PROCEDURE — 74177 CT ABD & PELVIS W/CONTRAST: CPT | Mod: 26

## 2024-07-02 PROCEDURE — 74177 CT ABD & PELVIS W/CONTRAST: CPT

## 2024-09-24 ENCOUNTER — TRANSCRIPTION ENCOUNTER (OUTPATIENT)
Age: 59
End: 2024-09-24

## 2024-09-26 ENCOUNTER — OUTPATIENT (OUTPATIENT)
Dept: OUTPATIENT SERVICES | Facility: HOSPITAL | Age: 59
LOS: 1 days | Discharge: HOME | End: 2024-09-26

## 2024-09-26 DIAGNOSIS — D64.9 ANEMIA, UNSPECIFIED: ICD-10-CM

## 2024-09-26 NOTE — H&P ADULT - NSHPPOACENTRALVENOUSCATHETER_GEN_ALL_CORE
William Mejia! Welcome to the [facility] in [place]!    We're glad you are here! A few things to know, please note that when calling our office it goes to a call center. The benefit of this is that you can reach 24/7 nurse triage or schedulers.   If trying to reach us directly, it is often best to use the [employer] carlo to send non-urgent messages. The carlo is also a great way to view lab results and request refills.   If you need to reschedule an appointment, please always try to do so as early as possible, at least 24 hours in advance. If you arrive late, we will try our best to accommodate, but may not be able to.  We believe preventative healthcare is important! We like to see our patients at least once a year to make sure we are helping you stay as healthy as possible. Depending on current health and diagnoses, we will let you know if we need to see you more often than this. Please schedule these planned appointments as early as possible, preferably when leaving your previous appointment, to ensure a preferred appointment time is available.  If you take any prescribed medications, please request refills at least one week in advance to ensure we can get them processed in time and so the pharmacy has time to fill them. It is important to follow-up as recommended to ensure we can continue providing refills.    Again, we're glad you are here so we can partner with you in keeping you healthy!    
no

## 2024-10-07 ENCOUNTER — APPOINTMENT (OUTPATIENT)
Dept: HEMATOLOGY ONCOLOGY | Facility: CLINIC | Age: 59
End: 2024-10-07
Payer: COMMERCIAL

## 2024-10-07 ENCOUNTER — RESULT REVIEW (OUTPATIENT)
Age: 59
End: 2024-10-07

## 2024-10-07 VITALS
DIASTOLIC BLOOD PRESSURE: 81 MMHG | WEIGHT: 181.98 LBS | TEMPERATURE: 97.7 F | BODY MASS INDEX: 25.38 KG/M2 | OXYGEN SATURATION: 97 % | SYSTOLIC BLOOD PRESSURE: 132 MMHG | RESPIRATION RATE: 16 BRPM | HEART RATE: 56 BPM

## 2024-10-07 LAB
BASOPHILS # BLD AUTO: 0.03 K/UL — SIGNIFICANT CHANGE UP (ref 0–0.2)
BASOPHILS NFR BLD AUTO: 1 % — SIGNIFICANT CHANGE UP (ref 0–2)
EOSINOPHIL # BLD AUTO: 0.09 K/UL — SIGNIFICANT CHANGE UP (ref 0–0.5)
EOSINOPHIL NFR BLD AUTO: 3 % — SIGNIFICANT CHANGE UP (ref 0–6)
HCT VFR BLD CALC: 43.7 % — SIGNIFICANT CHANGE UP (ref 39–50)
HGB BLD-MCNC: 14.3 G/DL — SIGNIFICANT CHANGE UP (ref 13–17)
IMM GRANULOCYTES NFR BLD AUTO: 0.3 % — SIGNIFICANT CHANGE UP (ref 0–0.9)
LYMPHOCYTES # BLD AUTO: 0.98 K/UL — LOW (ref 1–3.3)
LYMPHOCYTES # BLD AUTO: 32.2 % — SIGNIFICANT CHANGE UP (ref 13–44)
MCHC RBC-ENTMCNC: 29.8 PG — SIGNIFICANT CHANGE UP (ref 27–34)
MCHC RBC-ENTMCNC: 32.7 G/DL — SIGNIFICANT CHANGE UP (ref 32–36)
MCV RBC AUTO: 91 FL — SIGNIFICANT CHANGE UP (ref 80–100)
MONOCYTES # BLD AUTO: 0.45 K/UL — SIGNIFICANT CHANGE UP (ref 0–0.9)
MONOCYTES NFR BLD AUTO: 14.8 % — HIGH (ref 2–14)
NEUTROPHILS # BLD AUTO: 1.48 K/UL — LOW (ref 1.8–7.4)
NEUTROPHILS NFR BLD AUTO: 48.7 % — SIGNIFICANT CHANGE UP (ref 43–77)
NRBC # BLD: 0 /100 WBCS — SIGNIFICANT CHANGE UP (ref 0–0)
NRBC BLD-RTO: 0 /100 WBCS — SIGNIFICANT CHANGE UP (ref 0–0)
PLATELET # BLD AUTO: 194 K/UL — SIGNIFICANT CHANGE UP (ref 150–400)
RBC # BLD: 4.8 M/UL — SIGNIFICANT CHANGE UP (ref 4.2–5.8)
RBC # FLD: 14 % — SIGNIFICANT CHANGE UP (ref 10.3–14.5)
WBC # BLD: 3.04 K/UL — LOW (ref 3.8–10.5)
WBC # FLD AUTO: 3.04 K/UL — LOW (ref 3.8–10.5)

## 2024-10-07 PROCEDURE — 99214 OFFICE O/P EST MOD 30 MIN: CPT

## 2024-10-08 LAB — AT III PPP CHRO-ACNC: 111 %

## 2024-12-17 ENCOUNTER — APPOINTMENT (OUTPATIENT)
Dept: SURGERY | Facility: CLINIC | Age: 59
End: 2024-12-17
Payer: COMMERCIAL

## 2024-12-17 VITALS
OXYGEN SATURATION: 98 % | DIASTOLIC BLOOD PRESSURE: 79 MMHG | WEIGHT: 187 LBS | TEMPERATURE: 97.3 F | HEIGHT: 71 IN | SYSTOLIC BLOOD PRESSURE: 124 MMHG | BODY MASS INDEX: 26.18 KG/M2 | HEART RATE: 55 BPM

## 2024-12-17 PROCEDURE — 99214 OFFICE O/P EST MOD 30 MIN: CPT | Mod: 57

## 2024-12-18 ENCOUNTER — NON-APPOINTMENT (OUTPATIENT)
Age: 59
End: 2024-12-18

## 2024-12-18 ENCOUNTER — APPOINTMENT (OUTPATIENT)
Dept: INTERNAL MEDICINE | Facility: CLINIC | Age: 59
End: 2024-12-18
Payer: COMMERCIAL

## 2024-12-18 VITALS
DIASTOLIC BLOOD PRESSURE: 72 MMHG | WEIGHT: 187 LBS | HEIGHT: 71 IN | HEART RATE: 52 BPM | SYSTOLIC BLOOD PRESSURE: 120 MMHG | BODY MASS INDEX: 26.18 KG/M2 | OXYGEN SATURATION: 97 %

## 2024-12-18 DIAGNOSIS — N18.31 CHRONIC KIDNEY DISEASE, STAGE 3A: ICD-10-CM

## 2024-12-18 DIAGNOSIS — E55.9 VITAMIN D DEFICIENCY, UNSPECIFIED: ICD-10-CM

## 2024-12-18 DIAGNOSIS — I10 ESSENTIAL (PRIMARY) HYPERTENSION: ICD-10-CM

## 2024-12-18 DIAGNOSIS — E78.2 MIXED HYPERLIPIDEMIA: ICD-10-CM

## 2024-12-18 DIAGNOSIS — Z86.73 PERSONAL HISTORY OF TRANSIENT ISCHEMIC ATTACK (TIA), AND CEREBRAL INFARCTION W/OUT RESIDUAL DEFICITS: ICD-10-CM

## 2024-12-18 PROCEDURE — 36415 COLL VENOUS BLD VENIPUNCTURE: CPT

## 2024-12-18 PROCEDURE — 99214 OFFICE O/P EST MOD 30 MIN: CPT

## 2024-12-18 PROCEDURE — G2211 COMPLEX E/M VISIT ADD ON: CPT | Mod: NC

## 2024-12-19 ENCOUNTER — NON-APPOINTMENT (OUTPATIENT)
Age: 59
End: 2024-12-19

## 2024-12-19 LAB
ALBUMIN SERPL ELPH-MCNC: 4.6 G/DL
ALP BLD-CCNC: 63 U/L
ALT SERPL-CCNC: 27 U/L
ANION GAP SERPL CALC-SCNC: 12 MMOL/L
AST SERPL-CCNC: 27 U/L
BILIRUB SERPL-MCNC: 0.9 MG/DL
BUN SERPL-MCNC: 20 MG/DL
CALCIUM SERPL-MCNC: 10 MG/DL
CHLORIDE SERPL-SCNC: 105 MMOL/L
CHOLEST SERPL-MCNC: 146 MG/DL
CO2 SERPL-SCNC: 25 MMOL/L
CREAT SERPL-MCNC: 1.52 MG/DL
EGFR: 52 ML/MIN/1.73M2
GLUCOSE SERPL-MCNC: 92 MG/DL
HCT VFR BLD CALC: 46.5 %
HDLC SERPL-MCNC: 102 MG/DL
HGB BLD-MCNC: 15 G/DL
LDLC SERPL CALC-MCNC: 34 MG/DL
MCHC RBC-ENTMCNC: 29.1 PG
MCHC RBC-ENTMCNC: 32.3 G/DL
MCV RBC AUTO: 90.3 FL
NONHDLC SERPL-MCNC: 44 MG/DL
PLATELET # BLD AUTO: 223 K/UL
POTASSIUM SERPL-SCNC: 5.3 MMOL/L
PROT SERPL-MCNC: 7.7 G/DL
RBC # BLD: 5.15 M/UL
RBC # FLD: 13.6 %
SODIUM SERPL-SCNC: 142 MMOL/L
TRIGL SERPL-MCNC: 40 MG/DL
WBC # FLD AUTO: 4.7 K/UL

## 2024-12-20 ENCOUNTER — RESULT REVIEW (OUTPATIENT)
Age: 59
End: 2024-12-20

## 2024-12-27 ENCOUNTER — OUTPATIENT (OUTPATIENT)
Dept: OUTPATIENT SERVICES | Facility: HOSPITAL | Age: 59
LOS: 1 days | End: 2024-12-27
Payer: COMMERCIAL

## 2024-12-27 ENCOUNTER — APPOINTMENT (OUTPATIENT)
Dept: CT IMAGING | Facility: IMAGING CENTER | Age: 59
End: 2024-12-27
Payer: COMMERCIAL

## 2024-12-27 DIAGNOSIS — Z00.8 ENCOUNTER FOR OTHER GENERAL EXAMINATION: ICD-10-CM

## 2024-12-27 DIAGNOSIS — K43.9 VENTRAL HERNIA WITHOUT OBSTRUCTION OR GANGRENE: ICD-10-CM

## 2024-12-27 PROCEDURE — 74177 CT ABD & PELVIS W/CONTRAST: CPT

## 2024-12-27 PROCEDURE — 74177 CT ABD & PELVIS W/CONTRAST: CPT | Mod: 26

## 2025-01-02 ENCOUNTER — OUTPATIENT (OUTPATIENT)
Dept: OUTPATIENT SERVICES | Facility: HOSPITAL | Age: 60
LOS: 1 days | Discharge: HOME | End: 2025-01-02
Payer: COMMERCIAL

## 2025-01-02 VITALS
OXYGEN SATURATION: 99 % | DIASTOLIC BLOOD PRESSURE: 68 MMHG | SYSTOLIC BLOOD PRESSURE: 130 MMHG | HEIGHT: 71 IN | TEMPERATURE: 97 F | WEIGHT: 198.2 LBS | HEART RATE: 43 BPM | RESPIRATION RATE: 18 BRPM

## 2025-01-02 DIAGNOSIS — Z01.818 ENCOUNTER FOR OTHER PREPROCEDURAL EXAMINATION: ICD-10-CM

## 2025-01-02 DIAGNOSIS — K43.9 VENTRAL HERNIA WITHOUT OBSTRUCTION OR GANGRENE: ICD-10-CM

## 2025-01-02 DIAGNOSIS — I49.9 CARDIAC ARRHYTHMIA, UNSPECIFIED: ICD-10-CM

## 2025-01-02 DIAGNOSIS — Z86.73 PERSONAL HISTORY OF TRANSIENT ISCHEMIC ATTACK (TIA), AND CEREBRAL INFARCTION WITHOUT RESIDUAL DEFICITS: ICD-10-CM

## 2025-01-02 DIAGNOSIS — I10 ESSENTIAL (PRIMARY) HYPERTENSION: ICD-10-CM

## 2025-01-02 DIAGNOSIS — E78.5 HYPERLIPIDEMIA, UNSPECIFIED: ICD-10-CM

## 2025-01-02 LAB
ANION GAP SERPL CALC-SCNC: 2 MMOL/L — LOW (ref 5–17)
APTT BLD: 42.3 SEC — HIGH (ref 24.5–35.6)
BASOPHILS # BLD AUTO: 0.03 K/UL — SIGNIFICANT CHANGE UP (ref 0–0.2)
BASOPHILS NFR BLD AUTO: 0.6 % — SIGNIFICANT CHANGE UP (ref 0–2)
BUN SERPL-MCNC: 20 MG/DL — SIGNIFICANT CHANGE UP (ref 7–23)
CALCIUM SERPL-MCNC: 9.3 MG/DL — SIGNIFICANT CHANGE UP (ref 8.5–10.1)
CHLORIDE SERPL-SCNC: 107 MMOL/L — SIGNIFICANT CHANGE UP (ref 96–108)
CO2 SERPL-SCNC: 29 MMOL/L — SIGNIFICANT CHANGE UP (ref 22–31)
CREAT SERPL-MCNC: 1.64 MG/DL — HIGH (ref 0.5–1.3)
EGFR: 48 ML/MIN/1.73M2 — LOW
EOSINOPHIL # BLD AUTO: 0.1 K/UL — SIGNIFICANT CHANGE UP (ref 0–0.5)
EOSINOPHIL NFR BLD AUTO: 2 % — SIGNIFICANT CHANGE UP (ref 0–6)
GLUCOSE SERPL-MCNC: 91 MG/DL — SIGNIFICANT CHANGE UP (ref 70–99)
HCT VFR BLD CALC: 44.9 % — SIGNIFICANT CHANGE UP (ref 39–50)
HGB BLD-MCNC: 15 G/DL — SIGNIFICANT CHANGE UP (ref 13–17)
IMM GRANULOCYTES NFR BLD AUTO: 0.4 % — SIGNIFICANT CHANGE UP (ref 0–0.9)
INR BLD: 1.62 RATIO — HIGH (ref 0.85–1.16)
LYMPHOCYTES # BLD AUTO: 1.99 K/UL — SIGNIFICANT CHANGE UP (ref 1–3.3)
LYMPHOCYTES # BLD AUTO: 40.1 % — SIGNIFICANT CHANGE UP (ref 13–44)
MCHC RBC-ENTMCNC: 29.8 PG — SIGNIFICANT CHANGE UP (ref 27–34)
MCHC RBC-ENTMCNC: 33.4 G/DL — SIGNIFICANT CHANGE UP (ref 32–36)
MCV RBC AUTO: 89.1 FL — SIGNIFICANT CHANGE UP (ref 80–100)
MONOCYTES # BLD AUTO: 0.51 K/UL — SIGNIFICANT CHANGE UP (ref 0–0.9)
MONOCYTES NFR BLD AUTO: 10.3 % — SIGNIFICANT CHANGE UP (ref 2–14)
NEUTROPHILS # BLD AUTO: 2.31 K/UL — SIGNIFICANT CHANGE UP (ref 1.8–7.4)
NEUTROPHILS NFR BLD AUTO: 46.6 % — SIGNIFICANT CHANGE UP (ref 43–77)
NRBC # BLD: 0 /100 WBCS — SIGNIFICANT CHANGE UP (ref 0–0)
PLATELET # BLD AUTO: 225 K/UL — SIGNIFICANT CHANGE UP (ref 150–400)
POTASSIUM SERPL-MCNC: 4.6 MMOL/L — SIGNIFICANT CHANGE UP (ref 3.5–5.3)
POTASSIUM SERPL-SCNC: 4.6 MMOL/L — SIGNIFICANT CHANGE UP (ref 3.5–5.3)
PROTHROM AB SERPL-ACNC: 18.2 SEC — HIGH (ref 9.9–13.4)
RBC # BLD: 5.04 M/UL — SIGNIFICANT CHANGE UP (ref 4.2–5.8)
RBC # FLD: 13.6 % — SIGNIFICANT CHANGE UP (ref 10.3–14.5)
SODIUM SERPL-SCNC: 138 MMOL/L — SIGNIFICANT CHANGE UP (ref 135–145)
WBC # BLD: 4.96 K/UL — SIGNIFICANT CHANGE UP (ref 3.8–10.5)
WBC # FLD AUTO: 4.96 K/UL — SIGNIFICANT CHANGE UP (ref 3.8–10.5)

## 2025-01-02 PROCEDURE — 93010 ELECTROCARDIOGRAM REPORT: CPT

## 2025-01-02 NOTE — H&P PST ADULT - NEGATIVE GASTROINTESTINAL SYMPTOMS
no nausea/no vomiting/no diarrhea/no constipation/no flatulence/no abdominal pain/no melena/no hematochezia

## 2025-01-02 NOTE — H&P PST ADULT - CARDIOVASCULAR
details… bradycardic at 43bpm/S1 S2 present/no murmur/no pedal edema/irregular rate and rhythm/bradycardia

## 2025-01-02 NOTE — H&P PST ADULT - HISTORY OF PRESENT ILLNESS
58 y/o male with hx of CVA 2018, afib, htn, hld, here for presurgical examination for planned Robotic assisted laparoscopic ventral hernia repair with Dr. Smith on 1/16/2024. Denies recent travels in the past 30 days. No fever, SOB, cough, flu like symptoms or body rash- covid screen

## 2025-01-02 NOTE — H&P PST ADULT - GASTROINTESTINAL
ventral hernia/soft/nontender/nondistended/normal active bowel sounds/no guarding/no rigidity details…

## 2025-01-02 NOTE — H&P PST ADULT - FUNCTIONAL STATUS
can go up 2 flight of stairs continuously without SOB and or chest pain.  rides a bicycle daily for 4 miles without difficulty/4-10 METS

## 2025-01-02 NOTE — H&P PST ADULT - NEGATIVE NEUROLOGICAL SYMPTOMS
no transient paralysis/no paresthesias/no generalized seizures/no focal seizures/no tremors/no vertigo/no loss of sensation/no difficulty walking/no headache/no loss of consciousness/no hemiparesis/no confusion/no facial palsy

## 2025-01-02 NOTE — H&P PST ADULT - PROBLEM SELECTOR PLAN 5
stroke 2018. per hematologist pt is positive for anticardioloipin .  follows up with hematology. On Xarelto  Hold xarelto 72 hours before surgery. Pt to verify with prescribing provider.

## 2025-01-02 NOTE — H&P PST ADULT - PROBLEM SELECTOR PLAN 6
Preop instructions provided including NPO status. Hibiclens wash for infection control. Patient aware to stop NSAID, OTC herbals  for 7-10 days, needs to be accompanied  by adult upon discharge.  Patient verbalized understanding  Medical, cardiology and hematology clearance needed.  anesthesiologist to review pst labs, ekg, medical clearances and optimization for surgery

## 2025-01-02 NOTE — H&P PST ADULT - ENDOCRINE
[FreeTextEntry1] : GI- Dr Myron Goldberg- colonoscopy 2017, Dr Ej PERSON- Dr Doshi Pulm- Dr Land negative

## 2025-01-02 NOTE — H&P PST ADULT - NSICDXPASTMEDICALHX_GEN_ALL_CORE_FT
PAST MEDICAL HISTORY:  Cerebrovascular accident (CVA) due to occlusion of left middle cerebral artery Left M2 occlusion    HLD (hyperlipidemia)     HTN (hypertension)     Irregular heart beat

## 2025-01-02 NOTE — H&P PST ADULT - ASSESSMENT
58 y/o male with hx of CVA 2018, afib, htn, hld, here for presurgical examination for planned Robotic assisted laparoscopic ventral hernia repair with Dr. Smith on 2024. Denies recent travels in the past 30 days. No fever, SOB, cough, flu like symptoms or body rash- covid screen.    CAPRINI SCORE    AGE RELATED RISK FACTORS                                                             [x Age 41-60 years                                            (1 Point)  [ ] Age: 61-74 years                                           (2 Points)                 [ ] Age= 75 years                                                (3 Points)             DISEASE RELATED RISK FACTORS                                                       [ ] Edema in the lower extremities                 (1 Point)                     [ ] Varicose veins                                               (1 Point)                                 [x ] BMI > 25 Kg/m2                                            (1 Point)                                  [ ] Serious infection (ie PNA)                            (1 Point)                     [ ] Lung disease ( COPD, Emphysema)            (1 Point)                                                                          [ ] Acute myocardial infarction                         (1 Point)                  [ ] Congestive heart failure (in the previous month)  (1 Point)         [ ] Inflammatory bowel disease                            (1 Point)                  [ ] Central venous access, PICC or Port               (2 points)       (within the last month)                                                                [ ] Stroke (in the previous month)                        (5 Points)    [ ] Previous or present malignancy                       (2 points)                                                                                                                                                         HEMATOLOGY RELATED FACTORS                                                         [ ] Prior episodes of VTE                                     (3 Points)                     [ ] Positive family history for VTE                      (3 Points)                  [ ] Prothrombin 64111 A                                     (3 Points)                     [ ] Factor V Leiden                                                (3 Points)                        [ ] Lupus anticoagulants                                      (3 Points)                                                           x[ ] Anticardiolipin antibodies                              (3 Points)                                                       [ ] High homocysteine in the blood                   (3 Points)                                             [ ] Other congenital or acquired thrombophilia      (3 Points)                                                [ ] Heparin induced thrombocytopenia                  (3 Points)                                        MOBILITY RELATED FACTORS  [ ] Bed rest                                                         (1 Point)  [ ] Plaster cast                                                    (2 points)  [ ] Bed bound for more than 72 hours           (2 Points)    GENDER SPECIFIC FACTORS  [ ] Pregnancy or had a baby within the last month   (1 Point)  [ ] Post-partum < 6 weeks                                   (1 Point)  [ ] Hormonal therapy  or oral contraception   (1 Point)  [ ] History of pregnancy complications              (1 point)  [ ] Unexplained or recurrent              (1 Point)    OTHER RISK FACTORS                                           (1 Point)  [ ] BMI >40, smoking, diabetes requiring insulin, chemotherapy  blood transfusions and length of surgery over 2 hours    SURGERY RELATED RISK FACTORS  [ ]  Section within the last month     (1 Point)  [ ] Minor surgery                                                  (1 Point)  [ ] Arthroscopic surgery                                       (2 Points)  [ x] Planned major surgery lasting more            (2 Points)      than 45 minutes     [ ] Elective hip or knee joint replacement       (5 points)       surgery                                                TRAUMA RELATED RISK FACTORS  [ ] Fracture of the hip, pelvis, or leg                       (5 Points)  [ ] Spinal cord injury resulting in paralysis             (5 points)       (in the previous month)    [ ] Paralysis  (less than 1 month)                             (5 Points)  [ ] Multiple Trauma within 1 month                        (5 Points)    Total Score [  7    ]    Caprini Score 0-2: Low Risk, NO VTE prophylaxis required for most patients, encourage ambulation  Caprini Score 3-6: Moderate Risk , pharmacologic VTE prophylaxis is indicated for most patients (in the absence of contraindications)  Caprini Score Greater than or =7: High risk, pharmocologic VTE prophylaxis indicated for most patients (in the absence of contraindications)

## 2025-01-02 NOTE — H&P PST ADULT - PROBLEM SELECTOR PLAN 1
scheduled for planned Robotic assisted laparoscopic ventral hernia repair with Dr. Smith on 1/16/2024

## 2025-01-08 PROBLEM — E78.5 HYPERLIPIDEMIA, UNSPECIFIED: Chronic | Status: ACTIVE | Noted: 2025-01-02

## 2025-01-08 PROBLEM — I10 ESSENTIAL (PRIMARY) HYPERTENSION: Chronic | Status: ACTIVE | Noted: 2025-01-02

## 2025-01-09 ENCOUNTER — NON-APPOINTMENT (OUTPATIENT)
Age: 60
End: 2025-01-09

## 2025-01-09 ENCOUNTER — APPOINTMENT (OUTPATIENT)
Dept: INTERNAL MEDICINE | Facility: CLINIC | Age: 60
End: 2025-01-09
Payer: COMMERCIAL

## 2025-01-09 VITALS
DIASTOLIC BLOOD PRESSURE: 73 MMHG | SYSTOLIC BLOOD PRESSURE: 110 MMHG | TEMPERATURE: 98 F | HEIGHT: 71 IN | HEART RATE: 53 BPM | OXYGEN SATURATION: 98 % | BODY MASS INDEX: 26.6 KG/M2 | WEIGHT: 190 LBS

## 2025-01-09 DIAGNOSIS — Z01.818 ENCOUNTER FOR OTHER PREPROCEDURAL EXAMINATION: ICD-10-CM

## 2025-01-09 DIAGNOSIS — I48.91 UNSPECIFIED ATRIAL FIBRILLATION: ICD-10-CM

## 2025-01-09 PROCEDURE — 99214 OFFICE O/P EST MOD 30 MIN: CPT

## 2025-01-16 ENCOUNTER — APPOINTMENT (OUTPATIENT)
Dept: SURGERY | Facility: HOSPITAL | Age: 60
End: 2025-01-16

## 2025-01-16 ENCOUNTER — TRANSCRIPTION ENCOUNTER (OUTPATIENT)
Age: 60
End: 2025-01-16

## 2025-01-16 ENCOUNTER — OUTPATIENT (OUTPATIENT)
Dept: OUTPATIENT SERVICES | Facility: HOSPITAL | Age: 60
LOS: 1 days | Discharge: HOME | End: 2025-01-16
Payer: COMMERCIAL

## 2025-01-16 ENCOUNTER — RESULT REVIEW (OUTPATIENT)
Age: 60
End: 2025-01-16

## 2025-01-16 VITALS
SYSTOLIC BLOOD PRESSURE: 147 MMHG | OXYGEN SATURATION: 99 % | WEIGHT: 190.04 LBS | HEART RATE: 52 BPM | RESPIRATION RATE: 16 BRPM | DIASTOLIC BLOOD PRESSURE: 90 MMHG | TEMPERATURE: 98 F | HEIGHT: 71 IN

## 2025-01-16 VITALS
OXYGEN SATURATION: 98 % | HEART RATE: 66 BPM | RESPIRATION RATE: 14 BRPM | SYSTOLIC BLOOD PRESSURE: 151 MMHG | DIASTOLIC BLOOD PRESSURE: 89 MMHG

## 2025-01-16 DIAGNOSIS — Z98.890 OTHER SPECIFIED POSTPROCEDURAL STATES: Chronic | ICD-10-CM

## 2025-01-16 LAB
APTT BLD: 33.5 SEC — SIGNIFICANT CHANGE UP (ref 24.5–35.6)
INR BLD: 1.12 RATIO — SIGNIFICANT CHANGE UP (ref 0.85–1.16)
PROTHROM AB SERPL-ACNC: 12.6 SEC — SIGNIFICANT CHANGE UP (ref 9.9–13.4)

## 2025-01-16 PROCEDURE — S2900 ROBOTIC SURGICAL SYSTEM: CPT

## 2025-01-16 PROCEDURE — 49593 RPR AA HRN 1ST 3-10 RDC: CPT | Mod: AS

## 2025-01-16 PROCEDURE — 88302 TISSUE EXAM BY PATHOLOGIST: CPT | Mod: 26

## 2025-01-16 PROCEDURE — 49592 RPR AA HRN 1ST < 3 NCR/STRN: CPT

## 2025-01-16 DEVICE — MESH VENTRALIGHT ST ECHO 4.5IN: Type: IMPLANTABLE DEVICE | Status: FUNCTIONAL

## 2025-01-16 RX ORDER — SODIUM CHLORIDE 9 MG/ML
3 INJECTION, SOLUTION INTRAMUSCULAR; INTRAVENOUS; SUBCUTANEOUS EVERY 8 HOURS
Refills: 0 | Status: ACTIVE | OUTPATIENT
Start: 2025-01-16 | End: 2025-12-15

## 2025-01-16 RX ORDER — OXYCODONE HCL 15 MG
1 TABLET ORAL
Qty: 6 | Refills: 0
Start: 2025-01-16

## 2025-01-16 RX ORDER — HYDROMORPHONE HCL 4 MG
0.5 TABLET ORAL
Refills: 0 | Status: DISCONTINUED | OUTPATIENT
Start: 2025-01-16 | End: 2025-01-22

## 2025-01-16 RX ORDER — SODIUM CHLORIDE 9 MG/ML
1000 INJECTION, SOLUTION INTRAVENOUS
Refills: 0 | Status: DISCONTINUED | OUTPATIENT
Start: 2025-01-16 | End: 2025-01-22

## 2025-01-16 RX ORDER — RIVAROXABAN 2.5 MG/1
1 TABLET, FILM COATED ORAL
Qty: 0 | Refills: 0 | DISCHARGE

## 2025-01-16 RX ORDER — ATORVASTATIN CALCIUM 40 MG/1
1 TABLET, FILM COATED ORAL
Qty: 0 | Refills: 0 | DISCHARGE

## 2025-01-16 RX ADMIN — SODIUM CHLORIDE 75 MILLILITER(S): 9 INJECTION, SOLUTION INTRAVENOUS at 15:16

## 2025-01-16 NOTE — ASU DISCHARGE PLAN (ADULT/PEDIATRIC) - FINANCIAL ASSISTANCE
[facility] provides services at a reduced cost to those who are determined to be eligible through [facility]’s financial assistance program. Information regarding [facility]’s financial assistance program can be found by going to [email or web address] or by calling [phone].

## 2025-01-16 NOTE — ASU PATIENT PROFILE, ADULT - FALL HARM RISK - HARM RISK INTERVENTIONS

## 2025-01-16 NOTE — ASU DISCHARGE PLAN (ADULT/PEDIATRIC) - CARE PROVIDER_API CALL
Hong Smith  Surgery  [address]  [place] [id number]  Phone: [phone]  Fax: [phone]  Established Patient  Follow Up Time: 1 week

## 2025-01-16 NOTE — ANESTHESIA FOLLOW-UP NOTE - NSEVALATIONFT_GEN_ALL_CORE
I spoke with the patient about the risks and benefits of general anesthesia and the patient vocalized a desire to proceed with general anesthesia for his robotic ventral hernia repair

## 2025-01-16 NOTE — BRIEF OPERATIVE NOTE - NSICDXBRIEFPROCEDURE_GEN_ALL_CORE_FT
PROCEDURES:  Repair, hernia, reducible, abdominal wall, anterior, without history of prior repair, 3 cm to 10 cm, with mesh insertion 16-Jan-2025 15:18:45  Linda Frias

## 2025-01-16 NOTE — ASU DISCHARGE PLAN (ADULT/PEDIATRIC) - ASU DC SPECIAL INSTRUCTIONSFT
Follow up with Dr. Hong Smith in 1-2 weeks. Please call to schedule an appointment.  Please take Tylenol 1000mg  every 6 hours and Motrin 400mg every 6 hours, but alternate it so that you're taking pain medication every 3 hours. Please take Oxycodone ONLY for BREAKTHROUGH pain, as prescribed.  START TAKING XARELTO 1/17/25 NIGHT    WEAR ABDOMINAL BINDER AT ALL TIMES UNTIL FOLLOW U P WITH SURGEON     NOTIFY YOUR SURGEON IF: You have any bleeding that does not stop, any pus draining from your wound, any fever (over 100.4 F) or chills, persistent nausea/vomiting, persistent diarrhea, or if your pain is not controlled on your discharge pain medications.    Wound: You may take off outer pink dressing and shower after 48 hours. After showering, pat steri strips dry. Leave the white steri strips in place, they will fall off on their own in approximately 5-7 days or they will be removed at your follow up appointment.    Do not lift more than 15 lbs until cleared to do so by your surgeon.

## 2025-01-21 LAB — SURGICAL PATHOLOGY STUDY: SIGNIFICANT CHANGE UP

## 2025-01-23 DIAGNOSIS — K43.9 VENTRAL HERNIA WITHOUT OBSTRUCTION OR GANGRENE: ICD-10-CM

## 2025-01-23 DIAGNOSIS — I48.91 UNSPECIFIED ATRIAL FIBRILLATION: ICD-10-CM

## 2025-01-23 DIAGNOSIS — E78.5 HYPERLIPIDEMIA, UNSPECIFIED: ICD-10-CM

## 2025-01-23 DIAGNOSIS — Z86.73 PERSONAL HISTORY OF TRANSIENT ISCHEMIC ATTACK (TIA), AND CEREBRAL INFARCTION WITHOUT RESIDUAL DEFICITS: ICD-10-CM

## 2025-01-23 DIAGNOSIS — I10 ESSENTIAL (PRIMARY) HYPERTENSION: ICD-10-CM

## 2025-01-28 ENCOUNTER — APPOINTMENT (OUTPATIENT)
Dept: SURGERY | Facility: CLINIC | Age: 60
End: 2025-01-28
Payer: COMMERCIAL

## 2025-01-28 VITALS
OXYGEN SATURATION: 98 % | TEMPERATURE: 97.6 F | DIASTOLIC BLOOD PRESSURE: 64 MMHG | HEART RATE: 69 BPM | WEIGHT: 190 LBS | HEIGHT: 71 IN | BODY MASS INDEX: 26.6 KG/M2 | SYSTOLIC BLOOD PRESSURE: 129 MMHG

## 2025-01-28 PROCEDURE — 99214 OFFICE O/P EST MOD 30 MIN: CPT

## 2025-06-09 ENCOUNTER — APPOINTMENT (OUTPATIENT)
Dept: INTERNAL MEDICINE | Facility: CLINIC | Age: 60
End: 2025-06-09
Payer: COMMERCIAL

## 2025-06-09 VITALS
OXYGEN SATURATION: 98 % | HEIGHT: 71 IN | HEART RATE: 51 BPM | DIASTOLIC BLOOD PRESSURE: 82 MMHG | WEIGHT: 185 LBS | BODY MASS INDEX: 25.9 KG/M2 | SYSTOLIC BLOOD PRESSURE: 142 MMHG

## 2025-06-09 PROBLEM — R30.0 DYSURIA: Status: ACTIVE | Noted: 2025-06-09

## 2025-06-09 PROCEDURE — 99396 PREV VISIT EST AGE 40-64: CPT | Mod: 25

## 2025-06-09 PROCEDURE — 90684 PCV21 VACCINE IM: CPT

## 2025-06-09 PROCEDURE — G0009: CPT

## 2025-06-09 PROCEDURE — 36415 COLL VENOUS BLD VENIPUNCTURE: CPT

## 2025-06-14 ENCOUNTER — LABORATORY RESULT (OUTPATIENT)
Age: 60
End: 2025-06-14

## 2025-06-15 LAB
25(OH)D3 SERPL-MCNC: 54.3 NG/ML
ALBUMIN SERPL ELPH-MCNC: 4.1 G/DL
ALP BLD-CCNC: 57 U/L
ALT SERPL-CCNC: 28 U/L
ANION GAP SERPL CALC-SCNC: 12 MMOL/L
APPEARANCE: CLEAR
AST SERPL-CCNC: 29 U/L
BASOPHILS # BLD AUTO: 0.03 K/UL
BASOPHILS NFR BLD AUTO: 0.9 %
BILIRUB SERPL-MCNC: 0.7 MG/DL
BILIRUBIN URINE: NEGATIVE
BLOOD URINE: ABNORMAL
BUN SERPL-MCNC: 19 MG/DL
CALCIUM SERPL-MCNC: 9.5 MG/DL
CHLORIDE SERPL-SCNC: 104 MMOL/L
CHOLEST SERPL-MCNC: 141 MG/DL
CO2 SERPL-SCNC: 24 MMOL/L
COLOR: YELLOW
CREAT SERPL-MCNC: 1.65 MG/DL
EGFRCR SERPLBLD CKD-EPI 2021: 47 ML/MIN/1.73M2
EOSINOPHIL # BLD AUTO: 0.14 K/UL
EOSINOPHIL NFR BLD AUTO: 4.1 %
ESTIMATED AVERAGE GLUCOSE: 108 MG/DL
GLUCOSE QUALITATIVE U: NEGATIVE MG/DL
GLUCOSE SERPL-MCNC: 86 MG/DL
HBA1C MFR BLD HPLC: 5.4 %
HCT VFR BLD CALC: 44 %
HDLC SERPL-MCNC: 93 MG/DL
HGB BLD-MCNC: 13.9 G/DL
IMM GRANULOCYTES NFR BLD AUTO: 0.3 %
KETONES URINE: NEGATIVE MG/DL
LDLC SERPL-MCNC: 36 MG/DL
LEUKOCYTE ESTERASE URINE: NEGATIVE
LYMPHOCYTES # BLD AUTO: 1.04 K/UL
LYMPHOCYTES NFR BLD AUTO: 30.5 %
MAN DIFF?: NORMAL
MCHC RBC-ENTMCNC: 29.1 PG
MCHC RBC-ENTMCNC: 31.6 G/DL
MCV RBC AUTO: 92.2 FL
MONOCYTES # BLD AUTO: 0.49 K/UL
MONOCYTES NFR BLD AUTO: 14.4 %
NEUTROPHILS # BLD AUTO: 1.7 K/UL
NEUTROPHILS NFR BLD AUTO: 49.8 %
NITRITE URINE: NEGATIVE
NONHDLC SERPL-MCNC: 47 MG/DL
PH URINE: 5.5
PLATELET # BLD AUTO: 255 K/UL
POTASSIUM SERPL-SCNC: 4.6 MMOL/L
PROT SERPL-MCNC: 7 G/DL
PROTEIN URINE: NEGATIVE MG/DL
PSA SERPL-MCNC: 1.73 NG/ML
RBC # BLD: 4.77 M/UL
RBC # FLD: 14.3 %
SODIUM SERPL-SCNC: 140 MMOL/L
SPECIFIC GRAVITY URINE: 1.02
TRIGL SERPL-MCNC: 47 MG/DL
TSH SERPL-ACNC: 1.15 UIU/ML
UROBILINOGEN URINE: 0.2 MG/DL
WBC # FLD AUTO: 3.41 K/UL

## 2025-08-04 ENCOUNTER — APPOINTMENT (OUTPATIENT)
Dept: UROLOGY | Facility: CLINIC | Age: 60
End: 2025-08-04
Payer: COMMERCIAL

## 2025-08-04 VITALS
DIASTOLIC BLOOD PRESSURE: 87 MMHG | TEMPERATURE: 97.9 F | BODY MASS INDEX: 26.32 KG/M2 | RESPIRATION RATE: 16 BRPM | SYSTOLIC BLOOD PRESSURE: 138 MMHG | HEIGHT: 71 IN | WEIGHT: 188 LBS | OXYGEN SATURATION: 99 % | HEART RATE: 56 BPM

## 2025-08-04 DIAGNOSIS — Z12.5 ENCOUNTER FOR SCREENING FOR MALIGNANT NEOPLASM OF PROSTATE: ICD-10-CM

## 2025-08-04 DIAGNOSIS — R31.29 OTHER MICROSCOPIC HEMATURIA: ICD-10-CM

## 2025-08-04 PROCEDURE — G2211 COMPLEX E/M VISIT ADD ON: CPT | Mod: NC

## 2025-08-04 PROCEDURE — 99214 OFFICE O/P EST MOD 30 MIN: CPT

## 2025-09-08 ENCOUNTER — TRANSCRIPTION ENCOUNTER (OUTPATIENT)
Age: 60
End: 2025-09-08

## 2025-09-16 ENCOUNTER — APPOINTMENT (OUTPATIENT)
Dept: UROLOGY | Facility: CLINIC | Age: 60
End: 2025-09-16
Payer: COMMERCIAL

## 2025-09-16 VITALS
BODY MASS INDEX: 26.46 KG/M2 | TEMPERATURE: 98.2 F | DIASTOLIC BLOOD PRESSURE: 87 MMHG | HEIGHT: 71 IN | SYSTOLIC BLOOD PRESSURE: 138 MMHG | WEIGHT: 189 LBS | HEART RATE: 80 BPM | OXYGEN SATURATION: 99 %

## 2025-09-16 DIAGNOSIS — N32.89 OTHER SPECIFIED DISORDERS OF BLADDER: ICD-10-CM

## 2025-09-16 DIAGNOSIS — R31.29 OTHER MICROSCOPIC HEMATURIA: ICD-10-CM

## 2025-09-16 DIAGNOSIS — N40.0 BENIGN PROSTATIC HYPERPLASIA WITHOUT LOWER URINARY TRACT SYMPMS: ICD-10-CM

## 2025-09-16 PROCEDURE — 52000 CYSTOURETHROSCOPY: CPT

## 2025-09-16 PROCEDURE — 76775 US EXAM ABDO BACK WALL LIM: CPT

## 2025-09-16 PROCEDURE — 99213 OFFICE O/P EST LOW 20 MIN: CPT | Mod: 25

## (undated) DEVICE — POSITIONER PURPLE ARM ONE STEP (LARGE)

## (undated) DEVICE — BLADE SCALPEL SAFETYLOCK #15

## (undated) DEVICE — SUT VLOC 180 2-0 9" GS-22 GREEN

## (undated) DEVICE — D HELP - CLEARVIEW CLEARIFY SYSTEM

## (undated) DEVICE — XI SCISSOR TIP COVER

## (undated) DEVICE — PACK GENERAL LAPAROSCOPY

## (undated) DEVICE — DRAPE 3/4 SHEET W REINFORCEMENT 56X77"

## (undated) DEVICE — TROCAR COVIDIEN ENDO CLOSE SUTURING DEVICE

## (undated) DEVICE — SYR LUER LOK 10CC

## (undated) DEVICE — GLV 7 PROTEXIS (WHITE)

## (undated) DEVICE — NDL HYPO SAFE 25G X 1.5" (ORANGE)

## (undated) DEVICE — PREP CHLORAPREP HI-LITE ORANGE 26ML

## (undated) DEVICE — TUBING STRYKEFLOW II SUCTION / IRRIGATOR

## (undated) DEVICE — DRAPE LIGHT HANDLE COVER (BLUE)

## (undated) DEVICE — DRAPE SPLIT SHEET 77" X 120"

## (undated) DEVICE — WARMING BLANKET UPPER ADULT

## (undated) DEVICE — XI DRAPE ARM

## (undated) DEVICE — POSITIONER PINK PAD PIGAZZI SYSTEM

## (undated) DEVICE — SUT VLOC 180 0 9" GS-21 GREEN

## (undated) DEVICE — SOL IRR POUR H2O 250ML

## (undated) DEVICE — DRAPE MAYO STAND 30"

## (undated) DEVICE — POSITIONER FOAM HEAD CRADLE (PINK)

## (undated) DEVICE — POSITIONER FOAM EGG CRATE ULNAR 2PCS (PINK)

## (undated) DEVICE — VENODYNE/SCD SLEEVE CALF MEDIUM

## (undated) DEVICE — XI SEAL UNIVERSIAL 5-12MM

## (undated) DEVICE — SUT BIOSYN 4-0 18" P-12

## (undated) DEVICE — ELCTR STRYKER NEPTUNE SMOKE EVACUATION PENCIL (GREEN)

## (undated) DEVICE — GLV 7.5 PROTEXIS (WHITE)

## (undated) DEVICE — XI OBTURATOR OPTICAL BLADELESS 8MM

## (undated) DEVICE — DRSG STERISTRIPS 0.5 X 4"

## (undated) DEVICE — SUT VICRYL 0 27" UR-6

## (undated) DEVICE — SOL IRR POUR NS 0.9% 500ML

## (undated) DEVICE — XI DRAPE COLUMN